# Patient Record
Sex: FEMALE | Race: WHITE | NOT HISPANIC OR LATINO | Employment: UNEMPLOYED | URBAN - METROPOLITAN AREA
[De-identification: names, ages, dates, MRNs, and addresses within clinical notes are randomized per-mention and may not be internally consistent; named-entity substitution may affect disease eponyms.]

---

## 2018-06-07 LAB
EXTERNAL RUBELLA QUALITATIVE: NORMAL
EXTERNAL SYPHILIS RPR SCREEN: NORMAL

## 2018-09-14 LAB — EXTERNAL GTT 1 HOUR: 135

## 2018-11-14 ENCOUNTER — TRANSCRIBE ORDERS (OUTPATIENT)
Dept: LAB | Facility: HOSPITAL | Age: 23
End: 2018-11-14

## 2018-11-14 ENCOUNTER — LAB (OUTPATIENT)
Dept: LAB | Facility: HOSPITAL | Age: 23
End: 2018-11-14

## 2018-11-14 DIAGNOSIS — Z3A.36 36 WEEKS GESTATION OF PREGNANCY: ICD-10-CM

## 2018-11-14 DIAGNOSIS — Z34.83 PRENATAL CARE, SUBSEQUENT PREGNANCY, THIRD TRIMESTER: ICD-10-CM

## 2018-11-14 DIAGNOSIS — Z3A.36 36 WEEKS GESTATION OF PREGNANCY: Primary | ICD-10-CM

## 2018-11-14 LAB
EXTERNAL CHLAMYDIA SCREEN: NEGATIVE
EXTERNAL GONORRHEA SCREEN: NEGATIVE
EXTERNAL GROUP B STREP ANTIGEN: NEGATIVE
EXTERNAL HEPATITIS B SURFACE ANTIGEN: NEGATIVE
EXTERNAL HEPATITIS C AB: NEGATIVE
HIV1 P24 AG SERPL QL IA: NEGATIVE
TSH SERPL DL<=0.05 MIU/L-ACNC: 0.84 MIU/ML (ref 0.35–5.35)

## 2018-11-14 PROCEDURE — 84443 ASSAY THYROID STIM HORMONE: CPT

## 2018-11-14 PROCEDURE — 87340 HEPATITIS B SURFACE AG IA: CPT

## 2018-11-14 PROCEDURE — G0432 EIA HIV-1/HIV-2 SCREEN: HCPCS

## 2018-11-14 PROCEDURE — 36415 COLL VENOUS BLD VENIPUNCTURE: CPT

## 2018-11-14 PROCEDURE — 86803 HEPATITIS C AB TEST: CPT

## 2018-11-15 LAB
HBV SURFACE AG SERPL QL IA: NORMAL
HCV AB SER DONR QL: NORMAL
HIV1+2 AB SER QL: NORMAL

## 2018-12-01 ENCOUNTER — HOSPITAL ENCOUNTER (INPATIENT)
Dept: LABOR AND DELIVERY | Facility: HOSPITAL | Age: 23
LOS: 4 days | Discharge: HOME | End: 2018-12-05
Attending: NURSE PRACTITIONER | Admitting: OBSTETRICS & GYNECOLOGY

## 2018-12-01 PROBLEM — Z34.90 TERM PREGNANCY: Status: ACTIVE | Noted: 2018-12-01

## 2018-12-01 LAB
DEPRECATED RDW RBC AUTO: 42.4 FL (ref 37–54)
ERYTHROCYTE [DISTWIDTH] IN BLOOD BY AUTOMATED COUNT: 14.6 % (ref 11.3–14.5)
HCT VFR BLD AUTO: 31.8 % (ref 34.5–44)
HGB BLD-MCNC: 9.9 G/DL (ref 11.5–15.5)
MCH RBC QN AUTO: 24.9 PG (ref 27–31)
MCHC RBC AUTO-ENTMCNC: 31.1 G/DL (ref 32–36)
MCV RBC AUTO: 79.9 FL (ref 80–99)
PLATELET # BLD AUTO: 263 10*3/MM3 (ref 150–450)
PMV BLD AUTO: 11.2 FL (ref 6–12)
RBC # BLD AUTO: 3.98 10*6/MM3 (ref 3.89–5.14)
WBC NRBC COR # BLD: 13.02 10*3/MM3 (ref 3.5–10.8)

## 2018-12-01 PROCEDURE — 86900 BLOOD TYPING SEROLOGIC ABO: CPT | Performed by: NURSE PRACTITIONER

## 2018-12-01 PROCEDURE — 85027 COMPLETE CBC AUTOMATED: CPT | Performed by: NURSE PRACTITIONER

## 2018-12-01 PROCEDURE — 86850 RBC ANTIBODY SCREEN: CPT | Performed by: NURSE PRACTITIONER

## 2018-12-01 PROCEDURE — 93005 ELECTROCARDIOGRAM TRACING: CPT | Performed by: NURSE PRACTITIONER

## 2018-12-01 PROCEDURE — 80053 COMPREHEN METABOLIC PANEL: CPT | Performed by: NURSE PRACTITIONER

## 2018-12-01 PROCEDURE — 25010000002 BUTORPHANOL PER 1 MG: Performed by: NURSE PRACTITIONER

## 2018-12-01 PROCEDURE — 93010 ELECTROCARDIOGRAM REPORT: CPT | Performed by: INTERNAL MEDICINE

## 2018-12-01 PROCEDURE — 80306 DRUG TEST PRSMV INSTRMNT: CPT | Performed by: NURSE PRACTITIONER

## 2018-12-01 PROCEDURE — 86901 BLOOD TYPING SEROLOGIC RH(D): CPT | Performed by: NURSE PRACTITIONER

## 2018-12-01 RX ORDER — SODIUM CHLORIDE 0.9 % (FLUSH) 0.9 %
3-10 SYRINGE (ML) INJECTION AS NEEDED
Status: DISCONTINUED | OUTPATIENT
Start: 2018-12-01 | End: 2018-12-02 | Stop reason: HOSPADM

## 2018-12-01 RX ORDER — TERBUTALINE SULFATE 1 MG/ML
0.25 INJECTION, SOLUTION SUBCUTANEOUS AS NEEDED
Status: DISCONTINUED | OUTPATIENT
Start: 2018-12-01 | End: 2018-12-02

## 2018-12-01 RX ORDER — OXYTOCIN-SODIUM CHLORIDE 0.9% IV SOLN 30 UNIT/500ML 30-0.9/5 UT/ML-%
2-24 SOLUTION INTRAVENOUS
Status: DISCONTINUED | OUTPATIENT
Start: 2018-12-02 | End: 2018-12-02

## 2018-12-01 RX ORDER — SODIUM CHLORIDE 0.9 % (FLUSH) 0.9 %
3 SYRINGE (ML) INJECTION EVERY 12 HOURS SCHEDULED
Status: DISCONTINUED | OUTPATIENT
Start: 2018-12-01 | End: 2018-12-02 | Stop reason: HOSPADM

## 2018-12-01 RX ORDER — PROMETHAZINE HYDROCHLORIDE 12.5 MG/1
12.5 SUPPOSITORY RECTAL EVERY 6 HOURS PRN
Status: DISCONTINUED | OUTPATIENT
Start: 2018-12-01 | End: 2018-12-02 | Stop reason: HOSPADM

## 2018-12-01 RX ORDER — PROMETHAZINE HYDROCHLORIDE 12.5 MG/1
12.5 TABLET ORAL EVERY 6 HOURS PRN
Status: DISCONTINUED | OUTPATIENT
Start: 2018-12-01 | End: 2018-12-02 | Stop reason: HOSPADM

## 2018-12-01 RX ORDER — ONDANSETRON 2 MG/ML
4 INJECTION INTRAMUSCULAR; INTRAVENOUS EVERY 6 HOURS PRN
Status: DISCONTINUED | OUTPATIENT
Start: 2018-12-01 | End: 2018-12-02 | Stop reason: HOSPADM

## 2018-12-01 RX ORDER — ACETAMINOPHEN 325 MG/1
650 TABLET ORAL EVERY 4 HOURS PRN
Status: DISCONTINUED | OUTPATIENT
Start: 2018-12-01 | End: 2018-12-02

## 2018-12-01 RX ORDER — PROMETHAZINE HYDROCHLORIDE 25 MG/ML
12.5 INJECTION, SOLUTION INTRAMUSCULAR; INTRAVENOUS EVERY 6 HOURS PRN
Status: DISCONTINUED | OUTPATIENT
Start: 2018-12-01 | End: 2018-12-02 | Stop reason: HOSPADM

## 2018-12-01 RX ORDER — MAGNESIUM CARB/ALUMINUM HYDROX 105-160MG
30 TABLET,CHEWABLE ORAL ONCE
Status: DISCONTINUED | OUTPATIENT
Start: 2018-12-01 | End: 2018-12-02

## 2018-12-01 RX ORDER — LIDOCAINE HYDROCHLORIDE 10 MG/ML
5 INJECTION, SOLUTION EPIDURAL; INFILTRATION; INTRACAUDAL; PERINEURAL AS NEEDED
Status: DISCONTINUED | OUTPATIENT
Start: 2018-12-01 | End: 2018-12-02

## 2018-12-01 RX ORDER — BUTORPHANOL TARTRATE 1 MG/ML
2 INJECTION, SOLUTION INTRAMUSCULAR; INTRAVENOUS
Status: DISCONTINUED | OUTPATIENT
Start: 2018-12-01 | End: 2018-12-02

## 2018-12-01 RX ORDER — DIPHENHYDRAMINE HCL 25 MG
25 CAPSULE ORAL NIGHTLY PRN
Status: DISCONTINUED | OUTPATIENT
Start: 2018-12-01 | End: 2018-12-02 | Stop reason: HOSPADM

## 2018-12-01 RX ORDER — ONDANSETRON 4 MG/1
4 TABLET, FILM COATED ORAL EVERY 6 HOURS PRN
Status: DISCONTINUED | OUTPATIENT
Start: 2018-12-01 | End: 2018-12-02 | Stop reason: HOSPADM

## 2018-12-01 RX ORDER — DIPHENHYDRAMINE HYDROCHLORIDE 50 MG/ML
25 INJECTION INTRAMUSCULAR; INTRAVENOUS NIGHTLY PRN
Status: DISCONTINUED | OUTPATIENT
Start: 2018-12-01 | End: 2018-12-02 | Stop reason: HOSPADM

## 2018-12-01 RX ORDER — BUTORPHANOL TARTRATE 1 MG/ML
1 INJECTION, SOLUTION INTRAMUSCULAR; INTRAVENOUS
Status: DISCONTINUED | OUTPATIENT
Start: 2018-12-01 | End: 2018-12-02

## 2018-12-01 RX ORDER — SODIUM CHLORIDE, SODIUM LACTATE, POTASSIUM CHLORIDE, CALCIUM CHLORIDE 600; 310; 30; 20 MG/100ML; MG/100ML; MG/100ML; MG/100ML
125 INJECTION, SOLUTION INTRAVENOUS CONTINUOUS
Status: DISCONTINUED | OUTPATIENT
Start: 2018-12-01 | End: 2018-12-03

## 2018-12-01 RX ADMIN — SODIUM CHLORIDE, POTASSIUM CHLORIDE, SODIUM LACTATE AND CALCIUM CHLORIDE 1000 ML/HR: 600; 310; 30; 20 INJECTION, SOLUTION INTRAVENOUS at 23:20

## 2018-12-01 RX ADMIN — BUTORPHANOL TARTRATE 1 MG: 1 INJECTION, SOLUTION INTRAMUSCULAR; INTRAVENOUS at 23:38

## 2018-12-02 ENCOUNTER — ANESTHESIA EVENT (OUTPATIENT)
Dept: LABOR AND DELIVERY | Facility: HOSPITAL | Age: 23
End: 2018-12-02

## 2018-12-02 ENCOUNTER — ANESTHESIA (OUTPATIENT)
Dept: LABOR AND DELIVERY | Facility: HOSPITAL | Age: 23
End: 2018-12-02

## 2018-12-02 PROBLEM — Z34.90 TERM PREGNANCY: Status: RESOLVED | Noted: 2018-12-01 | Resolved: 2018-12-02

## 2018-12-02 LAB
ABO GROUP BLD: NORMAL
ALBUMIN SERPL-MCNC: 3.74 G/DL (ref 3.2–4.8)
ALBUMIN/GLOB SERPL: 1.7 G/DL (ref 1.5–2.5)
ALP SERPL-CCNC: 176 U/L (ref 25–100)
ALT SERPL W P-5'-P-CCNC: 31 U/L (ref 7–40)
AMPHET+METHAMPHET UR QL: NEGATIVE
AMPHETAMINES UR QL: NEGATIVE
ANION GAP SERPL CALCULATED.3IONS-SCNC: 4 MMOL/L (ref 3–11)
AST SERPL-CCNC: 26 U/L (ref 0–33)
BARBITURATES UR QL SCN: NEGATIVE
BENZODIAZ UR QL SCN: NEGATIVE
BILIRUB SERPL-MCNC: 0.4 MG/DL (ref 0.3–1.2)
BLD GP AB SCN SERPL QL: NEGATIVE
BUN BLD-MCNC: 12 MG/DL (ref 9–23)
BUN/CREAT SERPL: 19.7 (ref 7–25)
BUPRENORPHINE SERPL-MCNC: NEGATIVE NG/ML
CALCIUM SPEC-SCNC: 8.9 MG/DL (ref 8.7–10.4)
CANNABINOIDS SERPL QL: NEGATIVE
CHLORIDE SERPL-SCNC: 106 MMOL/L (ref 99–109)
CO2 SERPL-SCNC: 25 MMOL/L (ref 20–31)
COCAINE UR QL: NEGATIVE
CREAT BLD-MCNC: 0.61 MG/DL (ref 0.6–1.3)
GFR SERPL CREATININE-BSD FRML MDRD: 120 ML/MIN/1.73
GLOBULIN UR ELPH-MCNC: 2.2 GM/DL
GLUCOSE BLD-MCNC: 83 MG/DL (ref 70–100)
METHADONE UR QL SCN: NEGATIVE
OPIATES UR QL: NEGATIVE
OXYCODONE UR QL SCN: NEGATIVE
PCP UR QL SCN: NEGATIVE
POTASSIUM BLD-SCNC: 3.5 MMOL/L (ref 3.5–5.5)
PROPOXYPH UR QL: NEGATIVE
PROT SERPL-MCNC: 5.9 G/DL (ref 5.7–8.2)
RH BLD: POSITIVE
SODIUM BLD-SCNC: 135 MMOL/L (ref 132–146)
T&S EXPIRATION DATE: NORMAL
TRICYCLICS UR QL SCN: NEGATIVE

## 2018-12-02 PROCEDURE — 25010000003 CEFAZOLIN IN DEXTROSE 2-4 GM/100ML-% SOLUTION: Performed by: OBSTETRICS & GYNECOLOGY

## 2018-12-02 PROCEDURE — 59514 CESAREAN DELIVERY ONLY: CPT | Performed by: OBSTETRICS & GYNECOLOGY

## 2018-12-02 PROCEDURE — 10907ZC DRAINAGE OF AMNIOTIC FLUID, THERAPEUTIC FROM PRODUCTS OF CONCEPTION, VIA NATURAL OR ARTIFICIAL OPENING: ICD-10-PCS | Performed by: OBSTETRICS & GYNECOLOGY

## 2018-12-02 PROCEDURE — 25010000003 MORPHINE PER 10 MG: Performed by: ANESTHESIOLOGY

## 2018-12-02 PROCEDURE — 59025 FETAL NON-STRESS TEST: CPT

## 2018-12-02 PROCEDURE — 25010000002 PROMETHAZINE PER 50 MG: Performed by: NURSE PRACTITIONER

## 2018-12-02 PROCEDURE — 25010000002 ONDANSETRON PER 1 MG: Performed by: NURSE PRACTITIONER

## 2018-12-02 PROCEDURE — 25010000002 TERBUTALINE PER 1 MG: Performed by: NURSE PRACTITIONER

## 2018-12-02 PROCEDURE — C1755 CATHETER, INTRASPINAL: HCPCS | Performed by: ANESTHESIOLOGY

## 2018-12-02 PROCEDURE — 25010000002 HYDROMORPHONE PER 4 MG: Performed by: ANESTHESIOLOGY

## 2018-12-02 PROCEDURE — 51703 INSERT BLADDER CATH COMPLEX: CPT

## 2018-12-02 PROCEDURE — 25010000002 BUTORPHANOL PER 1 MG: Performed by: NURSE PRACTITIONER

## 2018-12-02 PROCEDURE — 25010000002 ONDANSETRON PER 1 MG: Performed by: ANESTHESIOLOGY

## 2018-12-02 PROCEDURE — C1755 CATHETER, INTRASPINAL: HCPCS

## 2018-12-02 PROCEDURE — 25010000002 FENTANYL CITRATE (PF) 100 MCG/2ML SOLUTION: Performed by: ANESTHESIOLOGY

## 2018-12-02 PROCEDURE — 25010000002 ROPIVACAINE PER 1 MG: Performed by: ANESTHESIOLOGY

## 2018-12-02 PROCEDURE — 3E033VJ INTRODUCTION OF OTHER HORMONE INTO PERIPHERAL VEIN, PERCUTANEOUS APPROACH: ICD-10-PCS | Performed by: OBSTETRICS & GYNECOLOGY

## 2018-12-02 PROCEDURE — 25010000002 METOCLOPRAMIDE PER 10 MG: Performed by: ANESTHESIOLOGY

## 2018-12-02 RX ORDER — DIPHENHYDRAMINE HCL 25 MG
25 CAPSULE ORAL EVERY 6 HOURS PRN
Status: DISCONTINUED | OUTPATIENT
Start: 2018-12-02 | End: 2018-12-02 | Stop reason: HOSPADM

## 2018-12-02 RX ORDER — ONDANSETRON 2 MG/ML
4 INJECTION INTRAMUSCULAR; INTRAVENOUS ONCE
Status: COMPLETED | OUTPATIENT
Start: 2018-12-02 | End: 2018-12-02

## 2018-12-02 RX ORDER — OXYTOCIN 10 [USP'U]/ML
INJECTION, SOLUTION INTRAMUSCULAR; INTRAVENOUS AS NEEDED
Status: DISCONTINUED | OUTPATIENT
Start: 2018-12-02 | End: 2018-12-02 | Stop reason: SURG

## 2018-12-02 RX ORDER — TRISODIUM CITRATE DIHYDRATE AND CITRIC ACID MONOHYDRATE 500; 334 MG/5ML; MG/5ML
30 SOLUTION ORAL ONCE
Status: COMPLETED | OUTPATIENT
Start: 2018-12-02 | End: 2018-12-02

## 2018-12-02 RX ORDER — METHYLERGONOVINE MALEATE 0.2 MG/ML
200 INJECTION INTRAVENOUS ONCE AS NEEDED
Status: DISCONTINUED | OUTPATIENT
Start: 2018-12-02 | End: 2018-12-02 | Stop reason: HOSPADM

## 2018-12-02 RX ORDER — NALOXONE HCL 0.4 MG/ML
0.4 VIAL (ML) INJECTION
Status: DISCONTINUED | OUTPATIENT
Start: 2018-12-02 | End: 2018-12-03

## 2018-12-02 RX ORDER — HYDROMORPHONE HYDROCHLORIDE 1 MG/ML
0.5 INJECTION, SOLUTION INTRAMUSCULAR; INTRAVENOUS; SUBCUTANEOUS
Status: COMPLETED | OUTPATIENT
Start: 2018-12-02 | End: 2018-12-02

## 2018-12-02 RX ORDER — CARBOPROST TROMETHAMINE 250 UG/ML
250 INJECTION, SOLUTION INTRAMUSCULAR AS NEEDED
Status: DISCONTINUED | OUTPATIENT
Start: 2018-12-02 | End: 2018-12-02 | Stop reason: HOSPADM

## 2018-12-02 RX ORDER — IBUPROFEN 600 MG/1
600 TABLET ORAL ONCE AS NEEDED
Status: DISCONTINUED | OUTPATIENT
Start: 2018-12-02 | End: 2018-12-02

## 2018-12-02 RX ORDER — ROPIVACAINE HYDROCHLORIDE 2 MG/ML
16 INJECTION, SOLUTION EPIDURAL; INFILTRATION; PERINEURAL CONTINUOUS
Status: DISCONTINUED | OUTPATIENT
Start: 2018-12-02 | End: 2018-12-02

## 2018-12-02 RX ORDER — TRISODIUM CITRATE DIHYDRATE AND CITRIC ACID MONOHYDRATE 500; 334 MG/5ML; MG/5ML
30 SOLUTION ORAL ONCE
Status: DISCONTINUED | OUTPATIENT
Start: 2018-12-02 | End: 2018-12-02

## 2018-12-02 RX ORDER — CEFAZOLIN SODIUM 2 G/100ML
2 INJECTION, SOLUTION INTRAVENOUS ONCE
Status: COMPLETED | OUTPATIENT
Start: 2018-12-02 | End: 2018-12-02

## 2018-12-02 RX ORDER — FENTANYL CITRATE 50 UG/ML
INJECTION, SOLUTION INTRAMUSCULAR; INTRAVENOUS AS NEEDED
Status: DISCONTINUED | OUTPATIENT
Start: 2018-12-02 | End: 2018-12-02 | Stop reason: SURG

## 2018-12-02 RX ORDER — OXYTOCIN-SODIUM CHLORIDE 0.9% IV SOLN 30 UNIT/500ML 30-0.9/5 UT/ML-%
650 SOLUTION INTRAVENOUS ONCE
Status: DISCONTINUED | OUTPATIENT
Start: 2018-12-02 | End: 2018-12-02 | Stop reason: HOSPADM

## 2018-12-02 RX ORDER — ACETAMINOPHEN 325 MG/1
650 TABLET ORAL ONCE AS NEEDED
Status: DISCONTINUED | OUTPATIENT
Start: 2018-12-02 | End: 2018-12-02 | Stop reason: HOSPADM

## 2018-12-02 RX ORDER — BUPIVACAINE HYDROCHLORIDE 7.5 MG/ML
INJECTION, SOLUTION INTRASPINAL AS NEEDED
Status: DISCONTINUED | OUTPATIENT
Start: 2018-12-02 | End: 2018-12-02 | Stop reason: SURG

## 2018-12-02 RX ORDER — FAMOTIDINE 10 MG/ML
INJECTION, SOLUTION INTRAVENOUS AS NEEDED
Status: DISCONTINUED | OUTPATIENT
Start: 2018-12-02 | End: 2018-12-02 | Stop reason: SURG

## 2018-12-02 RX ORDER — METOCLOPRAMIDE HYDROCHLORIDE 5 MG/ML
10 INJECTION INTRAMUSCULAR; INTRAVENOUS ONCE AS NEEDED
Status: DISCONTINUED | OUTPATIENT
Start: 2018-12-02 | End: 2018-12-02 | Stop reason: HOSPADM

## 2018-12-02 RX ORDER — MORPHINE SULFATE 0.5 MG/ML
INJECTION, SOLUTION EPIDURAL; INTRATHECAL; INTRAVENOUS AS NEEDED
Status: DISCONTINUED | OUTPATIENT
Start: 2018-12-02 | End: 2018-12-02 | Stop reason: SURG

## 2018-12-02 RX ORDER — HYDROCODONE BITARTRATE AND ACETAMINOPHEN 5; 325 MG/1; MG/1
1 TABLET ORAL EVERY 6 HOURS PRN
Status: DISCONTINUED | OUTPATIENT
Start: 2018-12-02 | End: 2018-12-03

## 2018-12-02 RX ORDER — MISOPROSTOL 200 UG/1
800 TABLET ORAL AS NEEDED
Status: DISCONTINUED | OUTPATIENT
Start: 2018-12-02 | End: 2018-12-02 | Stop reason: HOSPADM

## 2018-12-02 RX ORDER — ONDANSETRON 2 MG/ML
4 INJECTION INTRAMUSCULAR; INTRAVENOUS ONCE AS NEEDED
Status: DISCONTINUED | OUTPATIENT
Start: 2018-12-02 | End: 2018-12-02 | Stop reason: HOSPADM

## 2018-12-02 RX ORDER — EPHEDRINE SULFATE/0.9% NACL/PF 25 MG/5 ML
10 SYRINGE (ML) INTRAVENOUS
Status: DISCONTINUED | OUTPATIENT
Start: 2018-12-02 | End: 2018-12-02 | Stop reason: HOSPADM

## 2018-12-02 RX ORDER — IBUPROFEN 600 MG/1
600 TABLET ORAL EVERY 6 HOURS PRN
Status: DISCONTINUED | OUTPATIENT
Start: 2018-12-02 | End: 2018-12-02 | Stop reason: HOSPADM

## 2018-12-02 RX ORDER — OXYTOCIN-SODIUM CHLORIDE 0.9% IV SOLN 30 UNIT/500ML 30-0.9/5 UT/ML-%
85 SOLUTION INTRAVENOUS ONCE
Status: DISCONTINUED | OUTPATIENT
Start: 2018-12-02 | End: 2018-12-02 | Stop reason: HOSPADM

## 2018-12-02 RX ORDER — METOCLOPRAMIDE HYDROCHLORIDE 5 MG/ML
10 INJECTION INTRAMUSCULAR; INTRAVENOUS ONCE AS NEEDED
Status: COMPLETED | OUTPATIENT
Start: 2018-12-02 | End: 2018-12-02

## 2018-12-02 RX ORDER — HYDROCODONE BITARTRATE AND ACETAMINOPHEN 5; 325 MG/1; MG/1
2 TABLET ORAL EVERY 6 HOURS PRN
Status: DISCONTINUED | OUTPATIENT
Start: 2018-12-02 | End: 2018-12-03

## 2018-12-02 RX ORDER — LIDOCAINE HYDROCHLORIDE AND EPINEPHRINE 15; 5 MG/ML; UG/ML
INJECTION, SOLUTION EPIDURAL AS NEEDED
Status: DISCONTINUED | OUTPATIENT
Start: 2018-12-02 | End: 2018-12-02 | Stop reason: SURG

## 2018-12-02 RX ORDER — LIDOCAINE HYDROCHLORIDE AND EPINEPHRINE BITARTRATE 20; .01 MG/ML; MG/ML
INJECTION, SOLUTION SUBCUTANEOUS AS NEEDED
Status: DISCONTINUED | OUTPATIENT
Start: 2018-12-02 | End: 2018-12-02 | Stop reason: SURG

## 2018-12-02 RX ADMIN — SODIUM CHLORIDE, POTASSIUM CHLORIDE, SODIUM LACTATE AND CALCIUM CHLORIDE 1000 ML: 600; 310; 30; 20 INJECTION, SOLUTION INTRAVENOUS at 19:35

## 2018-12-02 RX ADMIN — SODIUM CHLORIDE, POTASSIUM CHLORIDE, SODIUM LACTATE AND CALCIUM CHLORIDE: 600; 310; 30; 20 INJECTION, SOLUTION INTRAVENOUS at 19:40

## 2018-12-02 RX ADMIN — ROPIVACAINE HYDROCHLORIDE 16 ML/HR: 2 INJECTION, SOLUTION EPIDURAL; INFILTRATION at 14:40

## 2018-12-02 RX ADMIN — METOCLOPRAMIDE 10 MG: 5 INJECTION, SOLUTION INTRAMUSCULAR; INTRAVENOUS at 20:23

## 2018-12-02 RX ADMIN — BUTORPHANOL TARTRATE 1 MG: 1 INJECTION, SOLUTION INTRAMUSCULAR; INTRAVENOUS at 00:04

## 2018-12-02 RX ADMIN — ONDANSETRON 4 MG: 2 INJECTION INTRAMUSCULAR; INTRAVENOUS at 20:23

## 2018-12-02 RX ADMIN — TERBUTALINE SULFATE 0.25 MG: 1 INJECTION SUBCUTANEOUS at 15:21

## 2018-12-02 RX ADMIN — PROMETHAZINE HYDROCHLORIDE 12.5 MG: 25 INJECTION, SOLUTION INTRAMUSCULAR; INTRAVENOUS at 08:12

## 2018-12-02 RX ADMIN — ROPIVACAINE HYDROCHLORIDE 10 ML: 5 INJECTION, SOLUTION EPIDURAL; INFILTRATION; PERINEURAL at 14:40

## 2018-12-02 RX ADMIN — ONDANSETRON 4 MG: 2 INJECTION INTRAMUSCULAR; INTRAVENOUS at 05:59

## 2018-12-02 RX ADMIN — LIDOCAINE HYDROCHLORIDE,EPINEPHRINE BITARTRATE 10 ML: 20; .01 INJECTION, SOLUTION INFILTRATION; PERINEURAL at 18:05

## 2018-12-02 RX ADMIN — LIDOCAINE HYDROCHLORIDE AND EPINEPHRINE 2 ML: 15; 5 INJECTION, SOLUTION EPIDURAL at 02:24

## 2018-12-02 RX ADMIN — MORPHINE SULFATE 0.2 MG: 0.5 INJECTION, SOLUTION EPIDURAL; INTRATHECAL; INTRAVENOUS at 20:16

## 2018-12-02 RX ADMIN — FENTANYL CITRATE 15 MCG: 50 INJECTION, SOLUTION INTRAMUSCULAR; INTRAVENOUS at 20:16

## 2018-12-02 RX ADMIN — FAMOTIDINE 20 MG: 10 INJECTION, SOLUTION INTRAVENOUS at 20:23

## 2018-12-02 RX ADMIN — ONDANSETRON 4 MG: 2 INJECTION INTRAMUSCULAR; INTRAVENOUS at 21:44

## 2018-12-02 RX ADMIN — LIDOCAINE HYDROCHLORIDE AND EPINEPHRINE 3 ML: 15; 5 INJECTION, SOLUTION EPIDURAL at 02:22

## 2018-12-02 RX ADMIN — BUPIVACAINE HYDROCHLORIDE IN DEXTROSE 1.8 ML: 7.5 INJECTION, SOLUTION SUBARACHNOID at 20:16

## 2018-12-02 RX ADMIN — ROPIVACAINE HYDROCHLORIDE 10 ML: 5 INJECTION, SOLUTION EPIDURAL; INFILTRATION; PERINEURAL at 10:40

## 2018-12-02 RX ADMIN — SODIUM CITRATE AND CITRIC ACID MONOHYDRATE 30 ML: 500; 334 SOLUTION ORAL at 20:00

## 2018-12-02 RX ADMIN — BUTORPHANOL TARTRATE 2 MG: 1 INJECTION, SOLUTION INTRAMUSCULAR; INTRAVENOUS at 02:09

## 2018-12-02 RX ADMIN — HYDROCODONE BITARTRATE AND ACETAMINOPHEN 2 TABLET: 5; 325 TABLET ORAL at 21:44

## 2018-12-02 RX ADMIN — OXYTOCIN-SODIUM CHLORIDE 0.9% IV SOLN 30 UNIT/500ML 2 MILLI-UNITS/MIN: 30-0.9/5 SOLUTION at 05:55

## 2018-12-02 RX ADMIN — HYDROMORPHONE HYDROCHLORIDE 0.5 MG: 1 INJECTION, SOLUTION INTRAMUSCULAR; INTRAVENOUS; SUBCUTANEOUS at 21:18

## 2018-12-02 RX ADMIN — SODIUM CHLORIDE, POTASSIUM CHLORIDE, SODIUM LACTATE AND CALCIUM CHLORIDE 1000 ML: 600; 310; 30; 20 INJECTION, SOLUTION INTRAVENOUS at 01:56

## 2018-12-02 RX ADMIN — OXYTOCIN 10 UNITS: 10 INJECTION, SOLUTION INTRAMUSCULAR; INTRAVENOUS at 20:30

## 2018-12-02 RX ADMIN — FENTANYL CITRATE 100 MCG: 50 INJECTION, SOLUTION INTRAMUSCULAR; INTRAVENOUS at 02:29

## 2018-12-02 RX ADMIN — OXYTOCIN 20 UNITS: 10 INJECTION, SOLUTION INTRAMUSCULAR; INTRAVENOUS at 20:35

## 2018-12-02 RX ADMIN — SODIUM CHLORIDE, POTASSIUM CHLORIDE, SODIUM LACTATE AND CALCIUM CHLORIDE 125 ML/HR: 600; 310; 30; 20 INJECTION, SOLUTION INTRAVENOUS at 02:37

## 2018-12-02 RX ADMIN — SODIUM CHLORIDE, POTASSIUM CHLORIDE, SODIUM LACTATE AND CALCIUM CHLORIDE 125 ML/HR: 600; 310; 30; 20 INJECTION, SOLUTION INTRAVENOUS at 11:19

## 2018-12-02 RX ADMIN — CEFAZOLIN SODIUM 2 G: 2 INJECTION, SOLUTION INTRAVENOUS at 19:54

## 2018-12-02 RX ADMIN — LIDOCAINE HYDROCHLORIDE,EPINEPHRINE BITARTRATE 10 ML: 20; .01 INJECTION, SOLUTION INFILTRATION; PERINEURAL at 15:00

## 2018-12-02 RX ADMIN — SODIUM CHLORIDE, POTASSIUM CHLORIDE, SODIUM LACTATE AND CALCIUM CHLORIDE: 600; 310; 30; 20 INJECTION, SOLUTION INTRAVENOUS at 20:30

## 2018-12-02 RX ADMIN — ONDANSETRON 4 MG: 2 INJECTION INTRAMUSCULAR; INTRAVENOUS at 12:46

## 2018-12-02 RX ADMIN — LIDOCAINE HYDROCHLORIDE,EPINEPHRINE BITARTRATE 10 ML: 20; .01 INJECTION, SOLUTION INFILTRATION; PERINEURAL at 16:25

## 2018-12-02 RX ADMIN — ROPIVACAINE HYDROCHLORIDE 16 ML/HR: 2 INJECTION, SOLUTION EPIDURAL; INFILTRATION at 02:33

## 2018-12-02 RX ADMIN — ROPIVACAINE HYDROCHLORIDE 13 ML: 5 INJECTION, SOLUTION EPIDURAL; INFILTRATION; PERINEURAL at 02:26

## 2018-12-02 RX ADMIN — HYDROMORPHONE HYDROCHLORIDE 0.5 MG: 1 INJECTION, SOLUTION INTRAMUSCULAR; INTRAVENOUS; SUBCUTANEOUS at 21:54

## 2018-12-02 RX ADMIN — ONDANSETRON 4 MG: 2 INJECTION INTRAMUSCULAR; INTRAVENOUS at 17:40

## 2018-12-02 NOTE — PROGRESS NOTES
[facility]  Obstetric Progress Note    Subjective     Patient:    Patient now comfortable after multiple epidural boluses.    Objective     Vital Signs Range for the last 24 hours  Temp:  [98 °F (36.7 °C)-99.9 °F (37.7 °C)] 99 °F (37.2 °C)   Temp src: Oral   BP: ()/(50-84) 133/76   Heart Rate:  [] 111   Resp:  [16-18] 18               Weight:  [109 kg (241 lb)] 109 kg (241 lb)       Intake/Output this shift:    I/O this shift:  In: -   Out: 1625 [Urine:1625]    Physical Exam:      Abdomen Abdominal exam: soft, nontender, nondistended, no masses or organomegaly.   Extremities Exam of extremities: peripheral pulses normal, no pedal edema, no clubbing or cyanosis     Presentation: vertex   Cervix: Exam by: Method: sterile exam per CNM, other (see comments)(no change)   Dilation:     Effacement: Cervical Effacement: 90%   Station:           Fetal Heart Rate Assessment   Method: Fetal HR Assessment Method: external   Beats/min: Fetal HR (beats/min): 155   Baseline: Fetal Heart Baseline Rate: normal range   Varibility: Fetal HR Variability: moderate (amplitude range 6 to 25 bpm)   Accels: Fetal HR Accelerations: greater than/equal to 15 bpm, lasting at least 15 seconds   Decels: Fetal HR Decelerations: absent   Tracing Category:       Uterine Assessment   Method: Method: IUPC (intrauterine pressure catheter)   Frequency (min): Contraction Frequency (Minutes): 2-3   Ctx Count in 10 min:     Duration:     Intensity: Contraction Intensity: moderate by palpation   Intensity by IUPC: Contraction Intensity (mm Hg) by IUPC: unable to determine, provider aware   Resting Tone: Uterine Resting Tone: soft by palpation   Resting Tone by IUPC: Uterine Resting Tone (mmHg) by IUPC: 25   [place] Units:         Assessment/Plan       Term pregnancy        Assessment:  1.  Intrauterine pregnancy at 39w1d weeks gestation with reactive fetal status.    2.  IOL  3.  Arrest of dilation despite adequate uterine  contractions.    Plan:  1. will proceed with primary  section  2.  Discussed with Dr. Martinez  3.   Plan of care has been reviewed with patient and she verbalizes understanding  4.  Risks, benefits of treatment plan have been discussed.  5.  All questions have been answered.        Herlinda Self CNM  2018  6:46 PM

## 2018-12-02 NOTE — PLAN OF CARE
Problem: Patient Care Overview  Goal: Plan of Care Review  Outcome: Ongoing (interventions implemented as appropriate)    Goal: Individualization and Mutuality  Outcome: Ongoing (interventions implemented as appropriate)    Goal: Discharge Needs Assessment  Outcome: Ongoing (interventions implemented as appropriate)    Goal: Interprofessional Rounds/Family Conf  Outcome: Ongoing (interventions implemented as appropriate)      Problem: Labor (Cervical Ripen, Induct, Augment) (Adult,Obstetrics,Pediatric)  Goal: Signs and Symptoms of Listed Potential Problems Will be Absent, Minimized or Managed (Labor)  Outcome: Ongoing (interventions implemented as appropriate)   12/02/18 2932   Goal/Outcome Evaluation   Problems Assessed (Labor) all

## 2018-12-02 NOTE — H&P
[place]  Obstetric History and Physical    No chief complaint on file.      Subjective     Patient is a 23 y.o. female  currently at 39w0d, who presents for induction of labor.    Her prenatal care is benign.  Her previous obstetric/gynecological history is noted for is non-contributory.  Her PMH is noted for stable SVT.  She underwent cardiac ablation in .     The following portions of the patients history were reviewed and updated as appropriate: current medications, allergies, past medical history, past surgical history, past family history, past social history and problem list .       Prenatal Information:  Prenatal Results     Initial Prenatal Labs     Test Value Reference Range Date Time    Hemoglobin        Hematocrit        Platelets        Rubella IgG        Hepatitis B SAg Non-Reactive  Non-Reactive 18 1620    Hepatitis C Ab Non-Reactive  Non-Reactive 18 1620    RPR        ABO        Rh        Antibody Screen        HIV Non-Reactive  Non-Reactive 18 1620    Urine Culture        Gonorrhea Negative   18     Chlamydia Negative   18     TSH 0.839 mIU/mL 0.350 - 5.350 mIU/mL 18 1620          2nd and 3rd Trimester     Test Value Reference Range Date Time    Hemoglobin (repeated)        Hematocrit (repeated)        GCT        Antibody Screen (repeated)        GTT Fasting        GTT 1 Hr        GTT 2 Hr        GTT 3 Hr        Group B Strep Negative   18           Drug Screening     Test Value Reference Range Date Time    Amphetamine Screen        Barbiturate Screen        Benzodiazepine Screen        Methadone Screen        Phencyclidine Screen        Opiates Screen        THC Screen        Cocaine Screen        Propoxyphene Screen        Buprenorphine Screen        Methamphetamine Screen        Oxycodone Screen        Tryicyclic Antidepressants Screen              Other (Risk screening)     Test Value Reference Range Date Time    Varicella IgG         Parvovirus IgG        CMV IgG        Cystic Fibrosis        Hemoglobin electrophoresis        NIPT        MSAFP-4        AFP (for NTD only)                  External Prenatal Results     Pregnancy Outside Results - Transcribed From Office Records - See Scanned Records For Details     Test Value Date Time    Hgb       Hct       ABO       Rh       Antibody Screen       Glucose Fasting GTT       Glucose Tolerance Test 1 hour       Glucose Tolerance Test 3 hour       Gonorrhea (discrete) Negative  18     Chlamydia (discrete) Negative  18     RPR       VDRL       Syphilis Antibody       Rubella       HBsAg Non-Reactive  18 1620    Herpes Simplex Virus PCR       Herpes Simplex VIrus Culture       HIV Non-Reactive  18 1620    Hep C RNA Quant PCR       Hep C Antibody Non-Reactive  18 1620    AFP       Group B Strep Negative  18     GBS Susceptibility to Clindamycin       GBS Susceptibility to Erythromycin       Fetal Fibronectin       Genetic Testing, Maternal Blood             Drug Screening     Test Value Date Time    Urine Drug Screen       Amphetamine Screen       Barbiturate Screen       Benzodiazepine Screen       Methadone Screen       Phencyclidine Screen       Opiates Screen       THC Screen       Cocaine Screen       Propoxyphene Screen       Buprenorphine Screen       Methamphetamine Screen       Oxycodone Screen       Tricyclic Antidepressants Screen                    Past OB History:     Obstetric History       T0      L0     SAB0   TAB0   Ectopic0   Molar0   Multiple0   Live Births0       # Outcome Date GA Lbr Sesar/2nd Weight Sex Delivery Anes PTL Lv   1 Current                   Past Medical History: Past Medical History:   Diagnosis Date   • Kidney stones     last one    • SVT (supraventricular tachycardia) (CMS/HCC)     ablasion at age 13      Past Surgical History Past Surgical History:   Procedure Laterality Date   • CARDIAC ABLATION        Family  History: No family history on file.   Social History:  reports that  has never smoked. she has never used smokeless tobacco.   reports that she does not drink alcohol.   reports that she does not use drugs.        Review of Systems   Constitutional: Negative.    HENT: Negative.    Eyes: Negative.    Respiratory: Negative.    Cardiovascular: Negative.    Gastrointestinal: Negative.    Endocrine: Negative.    Genitourinary: Negative.    Musculoskeletal: Negative.    Skin: Negative.    Allergic/Immunologic: Negative.    Neurological: Negative.    Hematological: Negative.    Psychiatric/Behavioral: Negative.          Objective     Vital Signs Range for the last 24 hours  Temperature: Temp:  [98.2 °F (36.8 °C)] 98.2 °F (36.8 °C)   Temp Source: Temp src: Oral   BP: BP: (123)/(82) 123/82   Pulse: Heart Rate:  [93] 93   Respirations: Resp:  [16] 16   SPO2:     O2 Amount (l/min):     O2 Devices     Weight:       Physical Examination: General appearance - alert, well appearing, and in no distress  Neck - supple, no significant adenopathy  Chest - clear to auscultation, no wheezes, rales or rhonchi, symmetric air entry  Heart - normal rate, regular rhythm, normal S1, S2, no murmurs, rubs, clicks or gallops  Abdomen - soft, nontender, nondistended, no masses or organomegaly  Pelvic - normal external genitalia, vulva, vagina, cervix, uterus and adnexa  Extremities - peripheral pulses normal, no pedal edema, no clubbing or cyanosis    Presentation: vertex   Cervix: Exam by:   CNM   Dilation:   1   Effacement:     Station:   -2     Fetal Heart Rate Assessment   Method:   EFM   Beats/min:   150   Baseline:   150s   Variability:   moderate   Accels:   present   Decels:  absent   Tracing Category:  1     Uterine Assessment   Method:   toco   Frequency (min):   no contractions noted   Ctx Count in 10 min:     Duration:     Intensity:     Intensity by IUPC:     Resting Tone:     Resting Tone by IUPC:     [place] Units:          Assessment/Plan       Term pregnancy      Assessment & Plan    Assessment:  1.  Intrauterine pregnancy at 39w0d gestation with reactive fetal status.    2.  Induction of labor  3.  Obstetrical history significant for is non-contributory.  4.  GBS status:   External Strep Group B Ag   Date Value Ref Range Status   11/14/2018 Negative  Final       Plan:  1. fetal and uterine monitoring  continuously, cervical ripening with  intra-uterine valdez catheter, labor augmentation  Pitocin and analgesia with  parenteral narcotics and epidural  2. Plan of care has been reviewed with patient and she verbalizes understanding  3.  Risks, benefits of treatment plan have been discussed.  4.  All questions have been answered.  5.  Epidural as desires      Herlinda Self CNM  12/1/2018  11:29 PM

## 2018-12-02 NOTE — PROGRESS NOTES
[facility]  Obstetric Progress Note    Subjective     Patient:    Patient complains of pain and pressure with contractions.    Objective     Vital Signs Range for the last 24 hours  Temp:  [98 °F (36.7 °C)-99.6 °F (37.6 °C)] 99.6 °F (37.6 °C)   Temp src: Oral   BP: ()/(50-84) 119/74   Heart Rate:  [] 90   Resp:  [16-18] 18               Weight:  [109 kg (241 lb)] 109 kg (241 lb)       Intake/Output this shift:    I/O this shift:  In: -   Out: 900 [Urine:900]    Physical Exam:      Abdomen Abdominal exam: soft, nontender, nondistended, no masses or organomegaly.   Extremities Exam of extremities: peripheral pulses normal, no pedal edema, no clubbing or cyanosis     Presentation: vertex   Cervix: Exam by: Method: sterile exam per CNM   Dilation:   7   Effacement: Cervical Effacement: 90%   Station:   0         Fetal Heart Rate Assessment   Method: Fetal HR Assessment Method: external   Beats/min: Fetal HR (beats/min): 155   Baseline: Fetal Heart Baseline Rate: normal range   Varibility: Fetal HR Variability: moderate (amplitude range 6 to 25 bpm)   Accels: Fetal HR Accelerations: greater than/equal to 15 bpm, lasting at least 15 seconds   Decels: Fetal HR Decelerations: absent   Tracing Category:       Uterine Assessment   Method: Method: IUPC (intrauterine pressure catheter)   Frequency (min): Contraction Frequency (Minutes): 1   Ctx Count in 10 min:     Duration:     Intensity: Contraction Intensity: moderate by palpation   Intensity by IUPC: Contraction Intensity (mm Hg) by IUPC: (provider at bedside; unable to determine; see MAR)   Resting Tone: Uterine Resting Tone: soft by palpation   Resting Tone by IUPC: Uterine Resting Tone (mmHg) by IUPC: 25   [place] Units:         Assessment/Plan       Term pregnancy        Assessment:  1.  Intrauterine pregnancy at 39w1d weeks gestation with reactive fetal status.    2.  IOL  3. .  IUPC placed.  Tachysystole noted. Pitocin decreased then discontinued.   Terbutaline administered    Plan:  1. Continue observation. Consider restarting pitocin for adequate MVUs  2.  Repeat SVE every 2-4 hours or prn  3.   Plan of care has been reviewed with patient and she verbalizes understanding  4.  Risks, benefits of treatment plan have been discussed.  5.  All questions have been answered.        Herlinda Self CNM  12/2/2018  3:52 PM

## 2018-12-02 NOTE — ANESTHESIA PROCEDURE NOTES
Labor Epidural      Patient reassessed immediately prior to procedure    Patient location during procedure: OB  Performed By  Anesthesiologist: Princess Jenkins DO  Preanesthetic Checklist  Completed: patient identified, surgical consent, pre-op evaluation, timeout performed, IV checked, risks and benefits discussed and monitors and equipment checked  Prep:  Pt Position:sitting  Sterile Tech:cap, gloves, mask and sterile barrier  Prep:DuraPrep  Monitoring:blood pressure monitoring  Epidural Block Procedure:  Approach:midline  Guidance:palpation technique  Location:L3-L4  Needle Type:Tuohy  Needle Gauge:17 G  Loss of Resistance Medium: air  Loss of Resistance: 7cm  Cath Depth at skin:13 cm  Paresthesia: none  Aspiration:negative  Test Dose:negative  Number of Attempts: 1  Post Assessment:  Dressing:occlusive dressing applied and secured with tape  Pt Tolerance:patient tolerated the procedure well with no apparent complications  Complications:no

## 2018-12-02 NOTE — PROGRESS NOTES
[facility]  Obstetric Progress Note    Subjective     Patient:    Resting without complaints    Objective     Vital Signs Range for the last 24 hours  Temp:  [98 °F (36.7 °C)-98.3 °F (36.8 °C)] 98.3 °F (36.8 °C)   Temp src: Oral   BP: ()/(50-84) 98/53   Heart Rate:  [] 100   Resp:  [16] 16               Weight:  [109 kg (241 lb)] 109 kg (241 lb)       Intake/Output this shift:    No intake/output data recorded.    Physical Exam:      Abdomen Abdominal exam: soft, nontender, nondistended, no masses or organomegaly.   Extremities Exam of extremities: peripheral pulses normal, no pedal edema, no clubbing or cyanosis     Presentation: vertex   Cervix: Exam by: Method: sterile exam per CNM   Dilation:   4   Effacement: Cervical Effacement: 80%   Station:   -1         Fetal Heart Rate Assessment   Method: Fetal HR Assessment Method: external   Beats/min: Fetal HR (beats/min): 135   Baseline: Fetal Heart Baseline Rate: normal range   Varibility: Fetal HR Variability: moderate (amplitude range 6 to 25 bpm)   Accels: Fetal HR Accelerations: greater than/equal to 15 bpm, lasting at least 15 seconds   Decels: Fetal HR Decelerations: absent   Tracing Category:       Uterine Assessment   Method: Method: external tocotransducer   Frequency (min): Contraction Frequency (Minutes): 2-3   Ctx Count in 10 min:     Duration:     Intensity: Contraction Intensity: mild by palpation   Intensity by IUPC:     Resting Tone: Uterine Resting Tone: soft by palpation   Resting Tone by IUPC:     [place] Units:         Assessment/Plan       Term pregnancy        Assessment:  1.  Intrauterine pregnancy at 39w1d weeks gestation with reactive fetal status.    2.  IOL  3.  Obstetrical history significant for is non-contributory.  4.  GBS status: No results found for: GBSANTIGEN  5. AROM with clear fluid  Plan:  1. Continue observation  2.  Repeat SVE every 2-4 hours or prn  3.   Plan of care has been reviewed with patient and she  verbalizes understanding  4.  Risks, benefits of treatment plan have been discussed.  5.  All questions have been answered.        Herlinda Self CNM  12/2/2018  7:28 AM

## 2018-12-02 NOTE — ANESTHESIA PREPROCEDURE EVALUATION
Anesthesia Evaluation     Patient summary reviewed and Nursing notes reviewed   NPO Solid Status: > 4 hours  NPO Liquid Status: > 4 hours           Airway   Mallampati: II  Neck ROM: full  No difficulty expected  Dental      Pulmonary - negative pulmonary ROS   Cardiovascular - negative cardio ROS        Neuro/Psych- negative ROS  GI/Hepatic/Renal/Endo - negative ROS     Musculoskeletal (-) negative ROS    Abdominal    Substance History - negative use     OB/GYN    (+) Pregnant,         Other - negative ROS       ROS/Med Hx Other: H/o SVT and ablation                  Anesthesia Plan    ASA 2     epidural     Anesthetic plan, all risks, benefits, and alternatives have been provided, discussed and informed consent has been obtained with: patient.

## 2018-12-03 LAB
BASOPHILS # BLD AUTO: 0.02 10*3/MM3 (ref 0–0.2)
BASOPHILS NFR BLD AUTO: 0.1 % (ref 0–1)
DEPRECATED RDW RBC AUTO: 44.8 FL (ref 37–54)
EOSINOPHIL # BLD AUTO: 0.04 10*3/MM3 (ref 0–0.3)
EOSINOPHIL NFR BLD AUTO: 0.2 % (ref 0–3)
ERYTHROCYTE [DISTWIDTH] IN BLOOD BY AUTOMATED COUNT: 15.2 % (ref 11.3–14.5)
HCT VFR BLD AUTO: 24.6 % (ref 34.5–44)
HGB BLD-MCNC: 7.6 G/DL (ref 11.5–15.5)
IMM GRANULOCYTES # BLD: 0.06 10*3/MM3 (ref 0–0.03)
IMM GRANULOCYTES NFR BLD: 0.4 % (ref 0–0.6)
LYMPHOCYTES # BLD AUTO: 1.95 10*3/MM3 (ref 0.6–4.8)
LYMPHOCYTES NFR BLD AUTO: 11.8 % (ref 24–44)
MCH RBC QN AUTO: 25 PG (ref 27–31)
MCHC RBC AUTO-ENTMCNC: 30.9 G/DL (ref 32–36)
MCV RBC AUTO: 80.9 FL (ref 80–99)
MONOCYTES # BLD AUTO: 1.09 10*3/MM3 (ref 0–1)
MONOCYTES NFR BLD AUTO: 6.6 % (ref 0–12)
NEUTROPHILS # BLD AUTO: 13.38 10*3/MM3 (ref 1.5–8.3)
NEUTROPHILS NFR BLD AUTO: 81.3 % (ref 41–71)
PLATELET # BLD AUTO: 187 10*3/MM3 (ref 150–450)
PMV BLD AUTO: 11.1 FL (ref 6–12)
RBC # BLD AUTO: 3.04 10*6/MM3 (ref 3.89–5.14)
WBC NRBC COR # BLD: 16.48 10*3/MM3 (ref 3.5–10.8)

## 2018-12-03 PROCEDURE — 85025 COMPLETE CBC W/AUTO DIFF WBC: CPT | Performed by: OBSTETRICS & GYNECOLOGY

## 2018-12-03 RX ORDER — OXYCODONE HYDROCHLORIDE AND ACETAMINOPHEN 5; 325 MG/1; MG/1
2 TABLET ORAL EVERY 4 HOURS PRN
Status: DISCONTINUED | OUTPATIENT
Start: 2018-12-03 | End: 2018-12-05 | Stop reason: HOSPADM

## 2018-12-03 RX ORDER — FERROUS SULFATE 325(65) MG
325 TABLET ORAL 2 TIMES DAILY WITH MEALS
Status: DISCONTINUED | OUTPATIENT
Start: 2018-12-03 | End: 2018-12-05 | Stop reason: HOSPADM

## 2018-12-03 RX ORDER — PRENATAL VIT/IRON FUM/FOLIC AC 27MG-0.8MG
1 TABLET ORAL DAILY
Status: DISCONTINUED | OUTPATIENT
Start: 2018-12-03 | End: 2018-12-05 | Stop reason: HOSPADM

## 2018-12-03 RX ORDER — SIMETHICONE 80 MG
80 TABLET,CHEWABLE ORAL 4 TIMES DAILY
Status: DISCONTINUED | OUTPATIENT
Start: 2018-12-03 | End: 2018-12-05 | Stop reason: HOSPADM

## 2018-12-03 RX ORDER — CALCIUM CARBONATE 200(500)MG
2 TABLET,CHEWABLE ORAL 3 TIMES DAILY PRN
Status: DISCONTINUED | OUTPATIENT
Start: 2018-12-03 | End: 2018-12-05 | Stop reason: HOSPADM

## 2018-12-03 RX ORDER — DIPHENHYDRAMINE HCL 25 MG
25 CAPSULE ORAL EVERY 4 HOURS PRN
Status: DISCONTINUED | OUTPATIENT
Start: 2018-12-03 | End: 2018-12-05 | Stop reason: HOSPADM

## 2018-12-03 RX ORDER — LANOLIN 100 %
OINTMENT (GRAM) TOPICAL
Status: DISCONTINUED | OUTPATIENT
Start: 2018-12-03 | End: 2018-12-05 | Stop reason: HOSPADM

## 2018-12-03 RX ORDER — MORPHINE SULFATE 2 MG/ML
2 INJECTION, SOLUTION INTRAMUSCULAR; INTRAVENOUS EVERY 4 HOURS PRN
Status: DISCONTINUED | OUTPATIENT
Start: 2018-12-03 | End: 2018-12-05 | Stop reason: HOSPADM

## 2018-12-03 RX ORDER — DOCUSATE SODIUM 100 MG/1
100 CAPSULE, LIQUID FILLED ORAL 2 TIMES DAILY
Status: DISCONTINUED | OUTPATIENT
Start: 2018-12-03 | End: 2018-12-05 | Stop reason: HOSPADM

## 2018-12-03 RX ORDER — IBUPROFEN 600 MG/1
600 TABLET ORAL EVERY 6 HOURS PRN
Status: DISCONTINUED | OUTPATIENT
Start: 2018-12-03 | End: 2018-12-05 | Stop reason: HOSPADM

## 2018-12-03 RX ORDER — SIMETHICONE 80 MG
80 TABLET,CHEWABLE ORAL 4 TIMES DAILY PRN
Status: DISCONTINUED | OUTPATIENT
Start: 2018-12-03 | End: 2018-12-05 | Stop reason: HOSPADM

## 2018-12-03 RX ORDER — ALUMINA, MAGNESIA, AND SIMETHICONE 2400; 2400; 240 MG/30ML; MG/30ML; MG/30ML
15 SUSPENSION ORAL EVERY 4 HOURS PRN
Status: DISCONTINUED | OUTPATIENT
Start: 2018-12-03 | End: 2018-12-05 | Stop reason: HOSPADM

## 2018-12-03 RX ORDER — NALOXONE HCL 0.4 MG/ML
0.4 VIAL (ML) INJECTION
Status: DISCONTINUED | OUTPATIENT
Start: 2018-12-03 | End: 2018-12-05 | Stop reason: HOSPADM

## 2018-12-03 RX ORDER — ONDANSETRON 2 MG/ML
4 INJECTION INTRAMUSCULAR; INTRAVENOUS EVERY 6 HOURS PRN
Status: DISCONTINUED | OUTPATIENT
Start: 2018-12-03 | End: 2018-12-05 | Stop reason: HOSPADM

## 2018-12-03 RX ORDER — ONDANSETRON 4 MG/1
4 TABLET, FILM COATED ORAL EVERY 6 HOURS PRN
Status: DISCONTINUED | OUTPATIENT
Start: 2018-12-03 | End: 2018-12-05 | Stop reason: HOSPADM

## 2018-12-03 RX ADMIN — Medication 325 MG: at 20:10

## 2018-12-03 RX ADMIN — HYDROCODONE BITARTRATE AND ACETAMINOPHEN 1 TABLET: 5; 325 TABLET ORAL at 05:22

## 2018-12-03 RX ADMIN — IBUPROFEN 600 MG: 600 TABLET ORAL at 08:20

## 2018-12-03 RX ADMIN — PRENATAL VIT W/ FE FUMARATE-FA TAB 27-0.8 MG 1 TABLET: 27-0.8 TAB at 08:20

## 2018-12-03 RX ADMIN — SIMETHICONE CHEW TAB 80 MG 80 MG: 80 TABLET ORAL at 20:10

## 2018-12-03 RX ADMIN — Medication 325 MG: at 09:56

## 2018-12-03 RX ADMIN — IBUPROFEN 600 MG: 600 TABLET ORAL at 13:46

## 2018-12-03 RX ADMIN — DOCUSATE SODIUM 100 MG: 100 CAPSULE, LIQUID FILLED ORAL at 08:20

## 2018-12-03 RX ADMIN — DOCUSATE SODIUM 100 MG: 100 CAPSULE, LIQUID FILLED ORAL at 20:10

## 2018-12-03 RX ADMIN — IBUPROFEN 600 MG: 600 TABLET ORAL at 20:10

## 2018-12-03 RX ADMIN — IBUPROFEN 600 MG: 600 TABLET ORAL at 01:01

## 2018-12-03 RX ADMIN — MEASLES, MUMPS, AND RUBELLA VIRUS VACCINE LIVE 0.5 ML: 1000; 12500; 1000 INJECTION, POWDER, LYOPHILIZED, FOR SUSPENSION SUBCUTANEOUS at 13:46

## 2018-12-03 RX ADMIN — OXYCODONE HYDROCHLORIDE AND ACETAMINOPHEN 1 TABLET: 5; 325 TABLET ORAL at 21:57

## 2018-12-03 RX ADMIN — POLYETHYLENE GLYCOL 3350 17 G: 17 POWDER, FOR SOLUTION ORAL at 20:10

## 2018-12-03 RX ADMIN — OXYCODONE HYDROCHLORIDE AND ACETAMINOPHEN 1 TABLET: 5; 325 TABLET ORAL at 09:56

## 2018-12-03 NOTE — L&D DELIVERY NOTE
Primary Low Transverse  Section Procedure Note    Soni Zarate    2018     Indications: 1. IUP at 39w1d   2. Failure to progress    Pre-operative Diagnosis: 39w1d    Post-operative Diagnosis: same    Findings: VFI in OP vertex presentation; normal appearing uterus, tubes, and ovaries    Birth Information  YOB: 2020   Time of birth: 8:30 PM   Delivering clinician: Caitlin Martinez   Sex: female   Delivery type: , Low Transverse   Breech type (if applicable):     Observed anomalies/comments:         Estimated Blood Loss:  800cc           Drains: Alonso, 300cc clear urine                 Specimens: placenta (not sent to pathology)               Complications:  None; patient tolerated the procedure well.           Disposition: PACU - hemodynamically stable.           Condition: stable    Surgeon: Caitlin Martinez MD     Assistants: Dany Metcalf MD    Anesthesia: Spinal anesthesia    ASA Class: 2    Procedure Details   The patient was seen in the Holding Room. The risks, benefits, complications, treatment options, and expected outcomes were discussed with the patient.  The patient concurred with the proposed plan, giving informed consent.  The site of surgery was properly noted. The patient was taken to the Operating Room, identified as Soni Zarate and the procedure verified as  Delivery. A Time Out was held and the above information confirmed.    The patient was taken to the operating room where spinal anesthesia was placed and was found to be adequate. She was prepped and draped in the usual sterile fashion in the dorsal supine position with a leftward tilt. A Pfannenstiel skin incision was made with the scalpel and carried through to the underlying layer of fascia using the bovie. The fascia was then nicked in the midline, and the fascial incision was extended laterally. The superior aspect of the fascial incision was then grasped with Kochers, elevated, and the  underlying rectus muscles were dissected off bluntly and sharply. In a similar fashion, the inferior aspect of the fascial incision was grasped with the Kochers, elevated, and the underlying rectus muscles were dissected off bluntly and sharply. The rectus muscles were then  in the midline and the peritoneum was identified. The peritoneum was entered bluntly, and this incision was extended superiorly and inferiorly with good visualization of underlying structures.  The bladder blade was then inserted. The vesicouterine peritoneum was identified, grasped with the pickups, and entered sharply using the Metzenbaum scissors. The incision was extended laterally and a bladder flap was created digitally. The bladder blade was reinserted. The lower uterine segment was identified and a low transverse uterine incision was made using the scalpel. Delivered from vertex presentation was a [place] Measurements  Weight (oz): 131.75    Length (in): 20.5    Head circumference (in):      Chest circumference (in):      with apgars scores of         APGARS  One minute Five minutes Ten minutes Fifteen minutes Twenty minutes   Skin color: 0   1             Heart rate: 2   2             Grimace: 2   2              Muscle tone: 2   2              Breathin   2              Totals: 8   9              . The nose and mouth were suctioned, the cord was milked, clamped and cut, and the infant was handed off to the awaiting Delivery Room Team. Cord blood was then obtained. The placenta was removed intact and appeared normal. The uterus was then exteriorized from the abdominal cavity and cleared of all clots and debris. The uterine outline, tubes and ovaries appeared normal. The hysterotomy was then closed using 1 monocryl in a running, locked fashion. Two additional interrupted sutures of 1 monocryl were placed at the right apex. Hemostasis was observed.  The uterus was placed back inside the abdominal cavity, and the gutters were  cleared of clots and debris. The hysterotomy was again reexamined and excellent hemostasis continued to be noted. The fascia was then reapproximated with running sutures of 0 vicryl. The incision was then irrigated, and the subcutaneous tissue was reapproximated with 3-0 plain. The skin was reapproximated with 4-0 monocryl and Skin glue.    Instrument, sponge, and needle counts were correct prior the abdominal closure and at the conclusion of the case.         Caitlin Martinez MD  9:11 PM  12/2/2018

## 2018-12-03 NOTE — ANESTHESIA PROCEDURE NOTES
Spinal Block      Start Time: 12/2/2018 8:15 PM  Stop Time: 12/2/2018 8:16 PM  Performed By  Anesthesiologist: Dave Mac MD  Preanesthetic Checklist  Completed: patient identified, site marked, surgical consent, pre-op evaluation, timeout performed, IV checked, risks and benefits discussed and monitors and equipment checked  Spinal Block Prep:  Patient Position:sitting  Sterile Tech:cap, gloves and sterile barriers  Prep:Betadine  Patient Monitoring:EKG, continuous pulse oximetry and blood pressure monitoring  Spinal Block Procedure  Approach:midline  Guidance:palpation technique  Location:L2-L3  Needle Type:Juan  Needle Gauge:25 G  Placement of Spinal needle event:cerebrospinal fluid aspirated  Paresthesia: no  Fluid Appearance:clear     Post Assessment  Patient Tolerance:patient tolerated the procedure well with no apparent complications  Complications no

## 2018-12-03 NOTE — PROGRESS NOTES
12/3/2018    Name:Soni Zarate    MR#:[id number]     PROGRESS NOTE:  Post-Op 1 S/P        Subjective   23 y.o. yo Female  s/p CS at 39w1d doing well. Pain well controlled, lochia appropriate. She has not ambulated yet. Valdez catheter remains in place. Patient denies dizziness or light headedness.     Patient Active Problem List   Diagnosis   • Single liveborn, born in hospital, delivered by  section        Objective    Vitals  Temp:  Temp:  [97.9 °F (36.6 °C)-99.9 °F (37.7 °C)] 97.9 °F (36.6 °C)  Temp src: Oral  BP:  BP: (100-145)/(54-84) 115/63  Pulse:  Heart Rate:  [] 87  RR:   Resp:  [16-20] 16    General Awake, alert, no distress  Abdomen Soft, non-distended, fundus firm, below umbilicus, appropriately tender  Incision  Intact, no erythema or exudate  Extremities Calves NT bilaterally     I/O last 3 completed shifts:  In: 1250 [I.V.:1250]  Out: 4925 [Urine:3325; Blood:1600]    Lab Results   Component Value Date    WBC 16.48 (H) 2018    HGB 7.6 (L) 2018    HCT 24.6 (L) 2018    MCV 80.9 2018     2018    AST 26 2018    ALT 31 2018    HEPBSAG Non-Reactive 2018     Results from last 7 days   Lab Units  18   2323   ABO TYPING   O   RH TYPING   Positive   ANTIBODY SCREEN   Negative       Infant: female       Assessment   1.  POD 1 from  Section  2. Postpartum anemia  Plan: Doing well.   Discontinue valdez   D/C iv, advance diet, may shower.  Start Iron  Repeat hgb/hct in am        Ju Vinson CNM  12/3/2018 8:44 AM

## 2018-12-03 NOTE — ANESTHESIA POSTPROCEDURE EVALUATION
Patient: Soni Zarate    Procedure Summary     Date:  18 Room / Location:  [facility] LABOR DELIVERY   [facility] LABOR DELIVERY    Anesthesia Start:  216 Anesthesia Stop:      Procedure:   SECTION PRIMARY (N/A Abdomen) Diagnosis:      Surgeon:  Caitlin Martinez MD Provider:  Dave Mac MD    Anesthesia Type:  epidural ASA Status:  2          Anesthesia Type: epidural  Last vitals  /64     Temp 99.1     Pulse 97     Resp 16     SpO2 98        Post Anesthesia Care and Evaluation    Patient location during evaluation: bedside  Patient participation: complete - patient participated  Level of consciousness: awake and alert  Pain score: 0  Pain management: adequate  Airway patency: patent  Anesthetic complications: No anesthetic complications    Cardiovascular status: acceptable  Respiratory status: acceptable  Hydration status: acceptable

## 2018-12-03 NOTE — OP NOTE
Primary Low Transverse  Section Procedure Note    Soni Zarate    2020     Indications: 1. IUP at 39w1d   2. Failure to progress    Pre-operative Diagnosis: 39w1d    Post-operative Diagnosis: same    Findings: VFI in OP vertex presentation; normal appearing uterus, tubes, and ovaries    Birth Information  YOB: 2020   Time of birth: 8:30 PM   Delivering clinician: Caitlin Martinez   Sex: female   Delivery type: , Low Transverse   Breech type (if applicable):     Observed anomalies/comments:         Estimated Blood Loss:  800cc           Drains: Alonso, 300cc clear urine                 Specimens: placenta (not sent to pathology)               Complications:  None; patient tolerated the procedure well.           Disposition: PACU - hemodynamically stable.           Condition: stable    Surgeon: Caitlin Martinez MD     Assistants: Dany Metcalf MD    Anesthesia: Spinal anesthesia    ASA Class: 2    Procedure Details   The patient was seen in the Holding Room. The risks, benefits, complications, treatment options, and expected outcomes were discussed with the patient.  The patient concurred with the proposed plan, giving informed consent.  The site of surgery was properly noted. The patient was taken to the Operating Room, identified as Soni Zarate and the procedure verified as  Delivery. A Time Out was held and the above information confirmed.    The patient was taken to the operating room where spinal anesthesia was placed and was found to be adequate. She was prepped and draped in the usual sterile fashion in the dorsal supine position with a leftward tilt. A Pfannenstiel skin incision was made with the scalpel and carried through to the underlying layer of fascia using the bovie. The fascia was then nicked in the midline, and the fascial incision was extended laterally. The superior aspect of the fascial incision was then grasped with Kochers, elevated, and the  underlying rectus muscles were dissected off bluntly and sharply. In a similar fashion, the inferior aspect of the fascial incision was grasped with the Kochers, elevated, and the underlying rectus muscles were dissected off bluntly and sharply. The rectus muscles were then  in the midline and the peritoneum was identified. The peritoneum was entered bluntly, and this incision was extended superiorly and inferiorly with good visualization of underlying structures.  The bladder blade was then inserted. The vesicouterine peritoneum was identified, grasped with the pickups, and entered sharply using the Metzenbaum scissors. The incision was extended laterally and a bladder flap was created digitally. The bladder blade was reinserted. The lower uterine segment was identified and a low transverse uterine incision was made using the scalpel. Delivered from vertex presentation was a [place] Measurements  Weight (oz): 131.75    Length (in): 20.5    Head circumference (in):      Chest circumference (in):      with apgars scores of         APGARS  One minute Five minutes Ten minutes Fifteen minutes Twenty minutes   Skin color: 0   1             Heart rate: 2   2             Grimace: 2   2              Muscle tone: 2   2              Breathin   2              Totals: 8   9              . The nose and mouth were suctioned, the cord was milked, clamped and cut, and the infant was handed off to the awaiting Delivery Room Team. Cord blood was then obtained. The placenta was removed intact and appeared normal. The uterus was then exteriorized from the abdominal cavity and cleared of all clots and debris. The uterine outline, tubes and ovaries appeared normal. The hysterotomy was then closed using 1 monocryl in a running, locked fashion. Two additional interrupted sutures of 1 monocryl were placed at the right apex. Hemostasis was observed.  The uterus was placed back inside the abdominal cavity, and the gutters were  cleared of clots and debris. The hysterotomy was again reexamined and excellent hemostasis continued to be noted. The fascia was then reapproximated with running sutures of 0 vicryl. The incision was then irrigated, and the subcutaneous tissue was reapproximated with 3-0 plain. The skin was reapproximated with 4-0 monocryl and Skin glue.    Instrument, sponge, and needle counts were correct prior the abdominal closure and at the conclusion of the case.         Caitlin Martinez MD  9:11 PM  12/2/2018

## 2018-12-03 NOTE — PROGRESS NOTES
12/3/2018    Name:Soni Zarate    MR#:[id number]     PROGRESS NOTE:  Post-Op 1 S/P        Subjective   23 y.o. yo Female  s/p CS at 39w1d doing well. Pain well controlled, lochia appropriate    Patient Active Problem List   Diagnosis   • Single liveborn, born in hospital, delivered by  section        Objective    Vitals  Temp:  Temp:  [97.9 °F (36.6 °C)-99.9 °F (37.7 °C)] 97.9 °F (36.6 °C)  Temp src: Oral  BP:  BP: (100-145)/(54-84) 115/63  Pulse:  Heart Rate:  [] 87  RR:   Resp:  [16-20] 16    General Awake, alert, no distress  Abdomen Soft, non-distended, fundus firm, below umbilicus, appropriately tender  Incision  Intact, no erythema or exudate  Extremities Calves NT bilaterally     I/O last 3 completed shifts:  In: 1250 [I.V.:1250]  Out: 4925 [Urine:3325; Blood:1600]    Lab Results   Component Value Date    WBC 16.48 (H) 2018    HGB 7.6 (L) 2018    HCT 24.6 (L) 2018    MCV 80.9 2018     2018    AST 26 2018    ALT 31 2018    HEPBSAG Non-Reactive 2018     Results from last 7 days   Lab Units  18   2323   ABO TYPING   O   RH TYPING   Positive   ANTIBODY SCREEN   Negative       Infant: female       Assessment   1.  POD 1 from  Section    Plan: Doing well.    D/C iv, advance diet, may shower.          Ju Vinson CNM  12/3/2018 8:42 AM

## 2018-12-03 NOTE — LACTATION NOTE
12/03/18 0730   Maternal Information   Date of Referral 12/03/18   Person Making Referral other (see comments)  (courtesy)   Maternal Reason for Referral breastfeeding currently   Maternal Assessment   Breast Size Issue none   Breast Shape Bilateral:;round   Breast Density Bilateral:;soft   Nipples Bilateral:;everted   Maternal Infant Feeding   Maternal Emotional State anxious;assist needed   Infant Positioning clutch/football   Signs of Milk Transfer audible swallow;infant jaw motion present   Pain with Feeding no   Comfort Measures Before/During Feeding infant position adjusted;maternal position adjusted;latch adjusted   Latch Assistance yes   Equipment Type   Breast Pump Type other (see comments)  (Rx given, encouraged aeroflow d/t anthem)   Reproductive Interventions   Breast Care: Breastfeeding frequency of feeding adjusted   Breastfeeding Assistance assisted with positioning;feeding cue recognition promoted;feeding on demand promoted;feeding session observed;infant latch-on verified;support offered;infant stimulated to wakeful state;infant suck/swallow verified   Breastfeeding Support encouragement provided;lactation counseling provided;diary/feeding log utilized

## 2018-12-03 NOTE — ANESTHESIA POSTPROCEDURE EVALUATION
Patient: Soni Zarate    Procedure Summary     Date:  18 Room / Location:  [facility] DELIVERY   [facility]    Anesthesia Start:  216 Anesthesia Stop:      Procedure:   SECTION PRIMARY (N/A Abdomen) Diagnosis:      Surgeon:  Caitlin Martinez MD Provider:  Dave Mac MD    Anesthesia Type:  epidural ASA Status:  2          Anesthesia Type: epidural  Last vitals  BP   115/63 (18 0800)   Temp   97.9 °F (36.6 °C) (18 0800)   Pulse   87 (18 0800)   Resp   16 (18 0800)     SpO2   97 % (18)     Post Anesthesia Care and Evaluation    Patient location during evaluation: bedside  Patient participation: complete - patient participated  Level of consciousness: awake and alert  Pain management: adequate  Airway patency: patent  Anesthetic complications: No anesthetic complications    Cardiovascular status: acceptable  Respiratory status: acceptable  Hydration status: acceptable  Post Neuraxial Block status: Motor and sensory function returned to baseline and No signs or symptoms of PDPH

## 2018-12-03 NOTE — LACTATION NOTE
This note was copied from a baby's chart.     12/03/18 0765   Nutrition   Feeding Readiness Cues rooting   Feeding Method breastfeeding   Feeding Tolerance/Success arousal required;coordinated suck;coordinated swallow   Feeding Interventions latch assistance provided;arousal required;sucking promoted   Additional Documentation LATCH Score (Group)   Breastfeeding Session   Breastfeeding Time, Left (min) 17   Breastfeeding Time, Right (min) 4   Breastfeeding breastfeeding, bilateral   Infant Positioning clutch/football   Effective Latch During Feeding yes   Suck/Swallow Coordination present   Signs of Milk Transfer audible swallow;infant jaw motion present   LATCH Score   Latch 2-->grasps breast, tongue down, lips flanged, rhythmic sucking   Audible Swallowing 1-->a few with stimulation   Type of Nipple 2-->everted (after stimulation)   Comfort (Breast/Nipple) 2-->soft/nontender   Hold (Positioning) 1-->minimal assist, teach one side, mother does other, staff holds   Latch Score 8   Safety   Infant location with mother, in her room   Electronic Transponder Number [id number]   Identification Band Number [id number] x2

## 2018-12-04 LAB
HCT VFR BLD AUTO: 25.9 % (ref 34.5–44)
HGB BLD-MCNC: 7.8 G/DL (ref 11.5–15.5)

## 2018-12-04 PROCEDURE — 85014 HEMATOCRIT: CPT | Performed by: NURSE PRACTITIONER

## 2018-12-04 PROCEDURE — 85018 HEMOGLOBIN: CPT | Performed by: NURSE PRACTITIONER

## 2018-12-04 RX ADMIN — OXYCODONE HYDROCHLORIDE AND ACETAMINOPHEN 1 TABLET: 5; 325 TABLET ORAL at 10:20

## 2018-12-04 RX ADMIN — DOCUSATE SODIUM 100 MG: 100 CAPSULE, LIQUID FILLED ORAL at 20:42

## 2018-12-04 RX ADMIN — Medication 325 MG: at 08:16

## 2018-12-04 RX ADMIN — OXYCODONE HYDROCHLORIDE AND ACETAMINOPHEN 1 TABLET: 5; 325 TABLET ORAL at 04:48

## 2018-12-04 RX ADMIN — OXYCODONE HYDROCHLORIDE AND ACETAMINOPHEN 2 TABLET: 5; 325 TABLET ORAL at 21:58

## 2018-12-04 RX ADMIN — DOCUSATE SODIUM 100 MG: 100 CAPSULE, LIQUID FILLED ORAL at 08:16

## 2018-12-04 RX ADMIN — Medication 325 MG: at 20:42

## 2018-12-04 RX ADMIN — SIMETHICONE CHEW TAB 80 MG 80 MG: 80 TABLET ORAL at 20:43

## 2018-12-04 RX ADMIN — IBUPROFEN 600 MG: 600 TABLET ORAL at 17:28

## 2018-12-04 RX ADMIN — IBUPROFEN 600 MG: 600 TABLET ORAL at 10:20

## 2018-12-04 RX ADMIN — SIMETHICONE CHEW TAB 80 MG 80 MG: 80 TABLET ORAL at 08:17

## 2018-12-04 RX ADMIN — OXYCODONE HYDROCHLORIDE AND ACETAMINOPHEN 1 TABLET: 5; 325 TABLET ORAL at 17:28

## 2018-12-04 RX ADMIN — IBUPROFEN 600 MG: 600 TABLET ORAL at 01:58

## 2018-12-04 RX ADMIN — SIMETHICONE CHEW TAB 80 MG 80 MG: 80 TABLET ORAL at 17:28

## 2018-12-04 RX ADMIN — PRENATAL VIT W/ FE FUMARATE-FA TAB 27-0.8 MG 1 TABLET: 27-0.8 TAB at 08:16

## 2018-12-04 NOTE — LACTATION NOTE
Mom unsure if she wants to breastfeed, pump and bottle feed, or formula feed.  Presented mom with all options and education.  Mom given small nipple shield and demonstrated application.  Encouraged mom to call for breast pump today.  Mom will follow-up with lactation about decision for how she would like to feed baby.

## 2018-12-05 VITALS
TEMPERATURE: 98 F | BODY MASS INDEX: 34.5 KG/M2 | WEIGHT: 241 LBS | OXYGEN SATURATION: 97 % | HEIGHT: 70 IN | DIASTOLIC BLOOD PRESSURE: 73 MMHG | HEART RATE: 100 BPM | RESPIRATION RATE: 16 BRPM | SYSTOLIC BLOOD PRESSURE: 117 MMHG

## 2018-12-05 RX ORDER — IBUPROFEN 600 MG/1
600 TABLET ORAL EVERY 6 HOURS PRN
Qty: 60 TABLET | Refills: 1 | Status: SHIPPED | OUTPATIENT
Start: 2018-12-05 | End: 2023-03-01

## 2018-12-05 RX ORDER — OXYCODONE HYDROCHLORIDE AND ACETAMINOPHEN 5; 325 MG/1; MG/1
2 TABLET ORAL EVERY 6 HOURS PRN
Qty: 24 TABLET | Refills: 0 | Status: SHIPPED | OUTPATIENT
Start: 2018-12-05 | End: 2018-12-13

## 2018-12-05 RX ADMIN — PRENATAL VIT W/ FE FUMARATE-FA TAB 27-0.8 MG 1 TABLET: 27-0.8 TAB at 08:19

## 2018-12-05 RX ADMIN — IBUPROFEN 600 MG: 600 TABLET ORAL at 05:32

## 2018-12-05 RX ADMIN — Medication 325 MG: at 08:19

## 2018-12-05 RX ADMIN — OXYCODONE HYDROCHLORIDE AND ACETAMINOPHEN 2 TABLET: 5; 325 TABLET ORAL at 05:32

## 2018-12-05 RX ADMIN — DOCUSATE SODIUM 100 MG: 100 CAPSULE, LIQUID FILLED ORAL at 08:19

## 2018-12-05 RX ADMIN — SIMETHICONE CHEW TAB 80 MG 80 MG: 80 TABLET ORAL at 08:19

## 2018-12-05 NOTE — DISCHARGE INSTR - APPOINTMENTS
A two week follow up appointment has been made for you to see Herlinda Self on 12-19-18 at 10:00 am

## 2018-12-05 NOTE — DISCHARGE SUMMARY
Johanna   Discharge Summary      Patient: Soni Zarate      MR#:[id number]  Admission  Diagnosis: Term Pregnancy  Discharge Diagnosis:  section    Date of Admission: 2018  Date of Discharge:  2018    Procedures:  , Low Transverse     2018    8:30 PM      Service:  Obstetrics    Hospital Course:  Patient underwent  section and remained in the hospital for 3 days.  During that time she remained afebrile and hemodynamically stable.  On the day of discharge, she was eating, ambulating and voiding without difficulty.        Labs:    Lab Results (last 24 hours)     ** No results found for the last 24 hours. **          Discharge Medications     Discharge Medications      New Medications      Instructions Start Date   ibuprofen 600 MG tablet  Commonly known as:  ADVIL,MOTRIN   600 mg, Oral, Every 6 Hours PRN      oxyCODONE-acetaminophen 5-325 MG per tablet  Commonly known as:  PERCOCET   2 tablets, Oral, Every 6 Hours PRN         Continue These Medications      Instructions Start Date   prenatal (CLASSIC) vitamin 28-0.8 MG tablet tablet   Oral, Daily         Stop These Medications    promethazine 25 MG tablet  Commonly known as:  PHENERGAN            Discharge Disposition:  To Home    Discharge Condition:  Stable    Discharge Diet: regular    Activity at Discharge: pelvic rest    Follow-up Appointments  2 weeks with Aramis Self CNM  18  9:00 AM

## 2018-12-05 NOTE — PLAN OF CARE
Problem: Patient Care Overview  Goal: Plan of Care Review  Outcome: Ongoing (interventions implemented as appropriate)   18 0638   Coping/Psychosocial   Plan of Care Reviewed With patient   Plan of Care Review   Progress improving   OTHER   Outcome Summary pain management good with Percocet 10 and Motrin, breastfeeding well, good latch, voiding without problems, anticipate d/c today       Problem: Postpartum ( Delivery) (Adult,Obstetrics,Pediatric)  Goal: Signs and Symptoms of Listed Potential Problems Will be Absent, Minimized or Managed (Postpartum)  Outcome: Ongoing (interventions implemented as appropriate)      Problem: Breastfeeding (Adult,Obstetrics,Pediatric)  Goal: Signs and Symptoms of Listed Potential Problems Will be Absent, Minimized or Managed (Breastfeeding)  Outcome: Ongoing (interventions implemented as appropriate)

## 2018-12-05 NOTE — PLAN OF CARE
Problem: Patient Care Overview  Goal: Plan of Care Review  Outcome: Outcome(s) achieved Date Met: 18    Goal: Individualization and Mutuality  Outcome: Outcome(s) achieved Date Met: 18    Goal: Discharge Needs Assessment  Outcome: Outcome(s) achieved Date Met: 18      Problem: Postpartum ( Delivery) (Adult,Obstetrics,Pediatric)  Goal: Signs and Symptoms of Listed Potential Problems Will be Absent, Minimized or Managed (Postpartum)  Outcome: Outcome(s) achieved Date Met: 18    Goal: Anesthesia/Sedation Recovery  Outcome: Outcome(s) achieved Date Met: 18      Problem: Breastfeeding (Adult,Obstetrics,Pediatric)  Goal: Signs and Symptoms of Listed Potential Problems Will be Absent, Minimized or Managed (Breastfeeding)  Outcome: Outcome(s) achieved Date Met: 18

## 2018-12-05 NOTE — PROGRESS NOTES
DREW Spencer   PROGRESS NOTE      Subjective     Patient reports:   Doing well, pain controlled with medication, ambulating around the room, mesha po    Objective      Vitals: Vital Signs Range for the last 24 hours  Temperature: Temp:  [98 °F (36.7 °C)-98.7 °F (37.1 °C)] 98 °F (36.7 °C)   Temp Source: Temp src: Oral   BP: BP: (115-133)/(62-76) 117/73   Pulse: Heart Rate:  [] 100   Respirations: Resp:  [14-18] 16   SPO2:     O2 Amount (l/min):     O2 Devices            Physical Exam    Lungs clear to auscultation bilaterally   Abdomen Soft, non-tender, normal bowel sounds; no bruits, organomegaly or masses.   Incision  healing well, no drainage, no erythema, no hernia, no seroma, no swelling, well approximated   Extremities extremities normal, atraumatic, no cyanosis or edema     LABS:  Lab Results   Component Value Date    WBC 16.48 (H) 2018    HGB 7.8 (L) 2018    HCT 25.9 (L) 2018    MCV 80.9 2018     2018       Assessment/Plan        Single liveborn, born in hospital, delivered by  section      Assessment:    Soni Zarate is Day 3  post-partum        Plan:  plan for discharge today.        Herlinda Self CNM  2018  8:57 AM

## 2018-12-05 NOTE — PROGRESS NOTES
DREW Spencer   PROGRESS NOTE      Subjective     Patient reports:   Doing well, pain controlled with medication, ambulating around the room, mesha po    Objective      Vitals: Vital Signs Range for the last 24 hours  Temperature: Temp:  [98 °F (36.7 °C)-98.7 °F (37.1 °C)] 98 °F (36.7 °C)   Temp Source: Temp src: Oral   BP: BP: (115-133)/(62-76) 117/73   Pulse: Heart Rate:  [] 100   Respirations: Resp:  [14-18] 16   SPO2:     O2 Amount (l/min):     O2 Devices            Physical Exam    Lungs clear to auscultation bilaterally   Abdomen Soft, non-tender, normal bowel sounds; no bruits, organomegaly or masses.   Incision  healing well, no drainage, no erythema, no hernia, no seroma, no swelling, well approximated   Extremities extremities normal, atraumatic, no cyanosis or edema     LABS:  Lab Results   Component Value Date    WBC 16.48 (H) 2018    HGB 7.8 (L) 2018    HCT 25.9 (L) 2018    MCV 80.9 2018     2018       Assessment/Plan        Single liveborn, born in hospital, delivered by  section      Assessment:    Soni Zarate is Day 2  post-partum        Plan:  continue post op care.        Herlinda Self CNM  2018  8:48 AM

## 2021-01-21 ENCOUNTER — OFFICE VISIT (OUTPATIENT)
Dept: INTERNAL MEDICINE | Facility: CLINIC | Age: 26
End: 2021-01-21

## 2021-01-21 VITALS
HEIGHT: 70 IN | HEART RATE: 89 BPM | SYSTOLIC BLOOD PRESSURE: 110 MMHG | TEMPERATURE: 98 F | OXYGEN SATURATION: 98 % | RESPIRATION RATE: 17 BRPM | WEIGHT: 230.6 LBS | DIASTOLIC BLOOD PRESSURE: 70 MMHG | BODY MASS INDEX: 33.01 KG/M2

## 2021-01-21 DIAGNOSIS — F41.1 GENERALIZED ANXIETY DISORDER WITH PANIC ATTACKS: Primary | ICD-10-CM

## 2021-01-21 DIAGNOSIS — G43.009 MIGRAINE WITHOUT AURA AND WITHOUT STATUS MIGRAINOSUS, NOT INTRACTABLE: ICD-10-CM

## 2021-01-21 DIAGNOSIS — F41.0 GENERALIZED ANXIETY DISORDER WITH PANIC ATTACKS: Primary | ICD-10-CM

## 2021-01-21 DIAGNOSIS — E66.9 OBESITY (BMI 30.0-34.9): ICD-10-CM

## 2021-01-21 DIAGNOSIS — F33.9 RECURRENT MAJOR DEPRESSIVE DISORDER, REMISSION STATUS UNSPECIFIED (HCC): ICD-10-CM

## 2021-01-21 PROCEDURE — 99204 OFFICE O/P NEW MOD 45 MIN: CPT | Performed by: PHYSICIAN ASSISTANT

## 2021-01-21 RX ORDER — RIZATRIPTAN BENZOATE 10 MG/1
TABLET ORAL
Qty: 12 TABLET | Refills: 2 | Status: SHIPPED | OUTPATIENT
Start: 2021-01-21 | End: 2021-04-06

## 2021-01-21 RX ORDER — CITALOPRAM 10 MG/1
10 TABLET ORAL DAILY
Qty: 30 TABLET | Refills: 2 | Status: SHIPPED | OUTPATIENT
Start: 2021-01-21 | End: 2021-02-04 | Stop reason: DRUGHIGH

## 2021-02-04 ENCOUNTER — TELEMEDICINE (OUTPATIENT)
Dept: PSYCHIATRY | Facility: CLINIC | Age: 26
End: 2021-02-04

## 2021-02-04 DIAGNOSIS — F41.1 GENERALIZED ANXIETY DISORDER: Primary | Chronic | ICD-10-CM

## 2021-02-04 DIAGNOSIS — F33.1 MAJOR DEPRESSIVE DISORDER, RECURRENT EPISODE, MODERATE (HCC): Chronic | ICD-10-CM

## 2021-02-04 DIAGNOSIS — G47.9 SLEEP DIFFICULTIES: Chronic | ICD-10-CM

## 2021-02-04 PROCEDURE — 90792 PSYCH DIAG EVAL W/MED SRVCS: CPT | Performed by: NURSE PRACTITIONER

## 2021-02-04 RX ORDER — HYDROXYZINE HYDROCHLORIDE 25 MG/1
TABLET, FILM COATED ORAL
Qty: 180 TABLET | Refills: 0 | Status: SHIPPED | OUTPATIENT
Start: 2021-02-04 | End: 2022-06-10 | Stop reason: SDUPTHER

## 2021-02-04 RX ORDER — CITALOPRAM 20 MG/1
20 TABLET ORAL DAILY
Qty: 30 TABLET | Refills: 0 | Status: SHIPPED | OUTPATIENT
Start: 2021-02-04 | End: 2021-03-04

## 2021-02-04 NOTE — TREATMENT PLAN
Multi-Disciplinary Problems (from Behavioral Health Treatment Plan)    Active Problems     Problem: Anxiety  Start Date: 02/04/21    Problem Details: The patient self-scales this problem as a 10 with 10 being the worst.        Goal Priority Start Date Expected End Date End Date    Patient will develop and implement behavioral and cognitive strategies to reduce anxiety and irrational fears. -- 02/04/21 -- --    Goal Details: Progress toward goal:  Not appropriate to rate progress toward goal since this is the initial treatment plan.        Goal Intervention Frequency Start Date End Date    Help patient explore past emotional issues in relation to present anxiety. Q Month 02/04/21 --    Intervention Details: Duration of treatment until until remission of symptoms.        Goal Intervention Frequency Start Date End Date    Help patient develop an awareness of their cognitive and physical responses to anxiety. Q Month 02/04/21 --    Intervention Details: Duration of treatment until until remission of symptoms.              Problem: Depression  Start Date: 02/04/21    Problem Details: The patient self-scales this problem as a 7 with 10 being the worst.        Goal Priority Start Date Expected End Date End Date    Patient will demonstrate the ability to initiate new constructive life skills outside of sessions on a consistent basis. -- 02/04/21 -- --    Goal Details: Progress toward goal:  Not appropriate to rate progress toward goal since this is the initial treatment plan.        Goal Intervention Frequency Start Date End Date    Assist patient in setting attainable activities of daily living goals. PRN 02/04/21 --    Goal Intervention Frequency Start Date End Date    Provide education about depression Q Month 02/04/21 --    Intervention Details: Duration of treatment until until remission of symptoms.        Goal Intervention Frequency Start Date End Date    Assist patient in developing healthy coping strategies. Q  Month 02/04/21 --    Intervention Details: Duration of treatment until until remission of symptoms.                           I have discussed and reviewed this treatment plan with the patient and/or guardian.  The patient has verbally agreed with this treatment plan (no signatures are obtained at today's visit as the patient is a telehealth patient and is unable to print and sign this document, therefore verbal agreement is obtained).  .
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
## 2021-02-18 PROBLEM — Z98.890 HISTORY OF CARDIAC RADIOFREQUENCY ABLATION: Status: ACTIVE | Noted: 2021-02-18

## 2021-03-04 ENCOUNTER — TELEMEDICINE (OUTPATIENT)
Dept: PSYCHIATRY | Facility: CLINIC | Age: 26
End: 2021-03-04

## 2021-03-04 DIAGNOSIS — G47.9 SLEEP DIFFICULTIES: Chronic | ICD-10-CM

## 2021-03-04 DIAGNOSIS — F33.1 MAJOR DEPRESSIVE DISORDER, RECURRENT EPISODE, MODERATE (HCC): Chronic | ICD-10-CM

## 2021-03-04 DIAGNOSIS — F41.1 GENERALIZED ANXIETY DISORDER: Primary | Chronic | ICD-10-CM

## 2021-03-04 PROCEDURE — 99214 OFFICE O/P EST MOD 30 MIN: CPT | Performed by: NURSE PRACTITIONER

## 2021-03-04 RX ORDER — VENLAFAXINE HYDROCHLORIDE 75 MG/1
75 CAPSULE, EXTENDED RELEASE ORAL DAILY
Qty: 30 CAPSULE | Refills: 0 | Status: SHIPPED | OUTPATIENT
Start: 2021-03-04 | End: 2022-01-14

## 2021-03-04 NOTE — PROGRESS NOTES
"This provider is located at the [facility] (through [facility]), [address], [place], [address] using a secure [employer] Video Visit through [employer]. Patient is being seen remotely via telehealth at their home address in [place], and stated they are in a secure environment for this session. The patient's condition being diagnosed/treated is appropriate for telemedicine. The provider identified herself as well as her credentials.   The patient, and/or patients guardian, consent to be seen remotely, and when consent is given they understand that the consent allows for patient identifiable information to be sent to a third party as needed.   They may refuse to be seen remotely at any time. The electronic data is encrypted and password protected, and the patient and/or guardian has been advised of the potential risks to privacy not withstanding such measures.    You have chosen to receive care through a telehealth visit.  Do you consent to use a video/audio connection for your medical care today? Yes        Subjective   Soni Zarate is a 25 y.o. female who presents today for follow up    Chief Complaint:  Anxiety and Depression    Accompanied by: Pt was alone during evaluation.    History of Present Illness:   \"I've been taking it one day at a time.\" Pt is anxious because she had custody arrangement and child support documents completed. She is worried about how her ex is going to do once he finds out that he has to pay over $700/month in child support. She states his anger can be \"out of control\". In the documents it also states he owes her half of what he took out of their bank account. Pt has been having more \"down\" days than good days. Pt states that Celexa may be making her mood worse. Pt is only getting about 5-6 hours of sleep on average and doesn't feel rested. Pt is trying to get to the gym more often, but childcare is still an issue. Pt had an anxiety " attack while at work today, she had to leave early. Pt states that Vistaril 25 mg helps with anxiety, but 50 mg is too much. Pt has lost 20 lbs since January. Pt only eats about once daily. The patient denies any abnormal muscle movements or tics. The patient rates her depression at a 7/10 on a 0-10 scale, with 10 being the worst. The patient rates her anxiety at a 10/10 on a 0-10 scale, with 10 being the worst. The patient would like to change her medications at this visit. The patient denies any suicidal or homicidal ideations, plans, or intent at today's encounter and is convincing. The patient denies any auditory hallucinations or visual hallucinations. The patient does not endorse any significant symptoms consistent with kerry or psychosis at today's encounter.      Prior Psychiatric Medications:  Celexa - made pt feel worse  Zoloft - ineffective        The following portions of the patient's history were reviewed and updated as appropriate: allergies, current medications, past family history, past medical history, past social history, past surgical history and problem list.          Past Medical History:  Past Medical History:   Diagnosis Date   • Abnormal Pap smear of cervix     HPV   • Anemia    • Anxiety    • Depression    • Kidney stones     last one 2014   • Migraines    • SVT (supraventricular tachycardia) (CMS/[facility]) 2008    ablasion at age 13       Social History:  Social History     Socioeconomic History   • Marital status:      Spouse name: Not on file   • Number of children: Not on file   • Years of education: Not on file   • Highest education level: Not on file   Tobacco Use   • Smoking status: Never Smoker   • Smokeless tobacco: Never Used   Substance and Sexual Activity   • Alcohol use: No   • Drug use: No   • Sexual activity: Not Currently     Partners: Male       Family History:  Family History   Problem Relation Age of Onset   • Diabetes Mother    • Hypertension Mother    • Mental illness  Mother    • Migraines Mother    • Thyroid disease Mother    • Depression Mother    • Anxiety disorder Mother    • Diabetes Father    • Hyperlipidemia Father    • Mental illness Father    • Asthma Brother    • Heart attack Maternal Grandfather        Past Surgical History:  Past Surgical History:   Procedure Laterality Date   • CARDIAC ABLATION      Hx SVT   •  SECTION N/A 2018    Procedure:  SECTION PRIMARY;  Surgeon: Caitlin Martinez MD;  Location: [facility] LABOR DELIVERY;  Service: Obstetrics   • KIDNEY STONE SURGERY         Problem List:  Patient Active Problem List   Diagnosis   • Single liveborn, born in hospital, delivered by  section   • Generalized anxiety disorder with panic attacks   • Recurrent major depressive disorder (CMS/HCC)   • Migraine without aura and without status migrainosus, not intractable   • History of cardiac radiofrequency ablation       Allergy:   No Known Allergies     Current Medications:   Current Outpatient Medications   Medication Sig Dispense Refill   • hydrOXYzine (ATARAX) 25 MG tablet Take 1-2 tablets PO TID PRN for anxiety/sleep 180 tablet 0   • ibuprofen (ADVIL,MOTRIN) 600 MG tablet Take 1 tablet by mouth Every 6 (Six) Hours As Needed for Mild Pain . 60 tablet 1   • Prenatal Vit-Fe Fumarate-FA (PRENATAL, CLASSIC, VITAMIN) 28-0.8 MG tablet tablet Take  by mouth Daily.     • rizatriptan (Maxalt) 10 MG tablet Take 1 tablet by mouth at onset of migraine. May repeat in 2 hours if needed. Do not exceed 2 tablets in 24 hours. 12 tablet 2   • venlafaxine XR (Effexor XR) 75 MG 24 hr capsule Take 1 capsule by mouth Daily for 30 days. 30 capsule 0     No current facility-administered medications for this visit.        Review of Symptoms:    Review of Systems   Constitutional: Positive for activity change, appetite change, fatigue and unexpected weight loss.   Psychiatric/Behavioral: Positive for decreased concentration, sleep disturbance, depressed mood and  stress. The patient is nervous/anxious.          Physical Exam:   Due to the remote nature of this encounter (virtual encounter), vitals were unable to be obtained.  Height stated at 69 inches.  Weight stated at 230 pounds.        Physical Exam  Neurological:      Mental Status: She is alert.   Psychiatric:         Attention and Perception: Attention and perception normal.         Mood and Affect: Mood is anxious and depressed.         Speech: Speech normal.         Behavior: Behavior normal. Behavior is cooperative.         Thought Content: Thought content normal. Thought content is not paranoid or delusional. Thought content does not include homicidal or suicidal ideation. Thought content does not include homicidal or suicidal plan.         Cognition and Memory: Cognition and memory normal.         Judgment: Judgment normal.      Comments: Depressed and anxious affect           Mental Status Exam:   Hygiene:   good  Cooperation:  Cooperative  Eye Contact:  Good  Psychomotor Behavior:  Restless  Affect:  Depressed and anxious  Mood: depressed  Hopelessness: 5  Speech:  Normal  Thought Process:  Linear  Thought Content:  Normal  Suicidal:  None  Homicidal:  None  Hallucinations:  None  Delusion:  None  Memory:  Intact  Orientation:  Person, Place, Time and Situation  Reliability:  good  Insight:  Good  Judgement:  Good  Impulse Control:  Fair  Physical/Medical Issues:  No        PHQ-9 Depression Screening  Little interest or pleasure in doing things? 1   Feeling down, depressed, or hopeless? 1   Trouble falling or staying asleep, or sleeping too much? 2   Feeling tired or having little energy? 2   Poor appetite or overeating? 3   Feeling bad about yourself - or that you are a failure or have let yourself or your family down? 3   Trouble concentrating on things, such as reading the newspaper or watching television? 1   Moving or speaking so slowly that other people could have noticed? Or the opposite - being so  fidgety or restless that you have been moving around a lot more than usual? 0   Thoughts that you would be better off dead, or of hurting yourself in some way? 0   PHQ-9 Total Score 13   If you checked off any problems, how difficult have these problems made it for you to do your work, take care of things at home, or get along with other people? Somewhat difficult     PHQ-9 Total Score: 13        JUDITH 7 anxiety screening tool that patient filled out virtually reviewed by this APRN at today's encounter.    PROMIS scale screening tool that patient filled out virtually reviewed by this APRN at today's encounter.    Previous Provider notes and available records reviewed by this APRN at today's encounter.         Lab Results:   No visits with results within 1 Month(s) from this visit.   Latest known visit with results is:   Admission on 12/01/2018, Discharged on 12/05/2018   Component Date Value Ref Range Status   • External Strep Group B Ag 11/14/2018 Negative   Final   • External Chlamydia Screen 11/14/2018 Negative   Final   • External Gonorrhea Screen 11/14/2018 Negative   Final   • External Hepatitis C Ab 11/14/2018 negative   Final   • External Hepatitis B Surface Ag 11/14/2018 Negative   Final   • External HIV Antibody 11/14/2018 Negative   Final   • WBC 12/01/2018 13.02* 3.50 - 10.80 10*3/mm3 Final   • RBC 12/01/2018 3.98  3.89 - 5.14 10*6/mm3 Final   • Hemoglobin 12/01/2018 9.9* 11.5 - 15.5 g/dL Final   • Hematocrit 12/01/2018 31.8* 34.5 - 44.0 % Final   • MCV 12/01/2018 79.9* 80.0 - 99.0 fL Final   • MCH 12/01/2018 24.9* 27.0 - 31.0 pg Final   • MCHC 12/01/2018 31.1* 32.0 - 36.0 g/dL Final   • RDW 12/01/2018 14.6* 11.3 - 14.5 % Final   • RDW-SD 12/01/2018 42.4  37.0 - 54.0 fl Final   • MPV 12/01/2018 11.2  6.0 - 12.0 fL Final   • Platelets 12/01/2018 263  150 - 450 10*3/mm3 Final   • ABO Type 12/01/2018 O   Final   • RH type 12/01/2018 Positive   Final   • Antibody Screen 12/01/2018 Negative   Final   • T&S  Expiration Date 12/01/2018 12/4/2018 11:59:59 PM   Final   • Glucose 12/01/2018 83  70 - 100 mg/dL Final   • BUN 12/01/2018 12  9 - 23 mg/dL Final   • Creatinine 12/01/2018 0.61  0.60 - 1.30 mg/dL Final   • Sodium 12/01/2018 135  132 - 146 mmol/L Final   • Potassium 12/01/2018 3.5  3.5 - 5.5 mmol/L Final   • Chloride 12/01/2018 106  99 - 109 mmol/L Final   • CO2 12/01/2018 25.0  20.0 - 31.0 mmol/L Final   • Calcium 12/01/2018 8.9  8.7 - 10.4 mg/dL Final   • Total Protein 12/01/2018 5.9  5.7 - 8.2 g/dL Final   • Albumin 12/01/2018 3.74  3.20 - 4.80 g/dL Final   • ALT (SGPT) 12/01/2018 31  7 - 40 U/L Final   • AST (SGOT) 12/01/2018 26  0 - 33 U/L Final   • Alkaline Phosphatase 12/01/2018 176* 25 - 100 U/L Final   • Total Bilirubin 12/01/2018 0.4  0.3 - 1.2 mg/dL Final   • eGFR Non African Amer 12/01/2018 120  >60 mL/min/1.73 Final   • Globulin 12/01/2018 2.2  gm/dL Final   • A/G Ratio 12/01/2018 1.7  1.5 - 2.5 g/dL Final   • BUN/Creatinine Ratio 12/01/2018 19.7  7.0 - 25.0 Final   • Anion Gap 12/01/2018 4.0  3.0 - 11.0 mmol/L Final   • THC, Screen, Urine 12/01/2018 Negative  Negative Final   • Phencyclidine (PCP), Urine 12/01/2018 Negative  Negative Final   • Cocaine Screen, Urine 12/01/2018 Negative  Negative Final   • Methamphetamine, Ur 12/01/2018 Negative  Negative Final   • Opiate Screen 12/01/2018 Negative  Negative Final   • Amphetamine Screen, Urine 12/01/2018 Negative  Negative Final   • Benzodiazepine Screen, Urine 12/01/2018 Negative  Negative Final   • Tricyclic Antidepressants Screen 12/01/2018 Negative  Negative Final   • Methadone Screen, Urine 12/01/2018 Negative  Negative Final   • Barbiturates Screen, Urine 12/01/2018 Negative  Negative Final   • Oxycodone Screen, Urine 12/01/2018 Negative  Negative Final   • Propoxyphene Screen 12/01/2018 Negative  Negative Final   • Buprenorphine, Screen, Urine 12/01/2018 Negative  Negative Final   • External GTT 1 Hour 09/14/2018 135   Final   • External RPR  06/07/2018 Non-Reactive   Final   • External Rubella Qual 06/07/2018 Non-Immune   Final   • WBC 12/03/2018 16.48* 3.50 - 10.80 10*3/mm3 Final   • RBC 12/03/2018 3.04* 3.89 - 5.14 10*6/mm3 Final   • Hemoglobin 12/03/2018 7.6* 11.5 - 15.5 g/dL Final   • Hematocrit 12/03/2018 24.6* 34.5 - 44.0 % Final   • MCV 12/03/2018 80.9  80.0 - 99.0 fL Final   • MCH 12/03/2018 25.0* 27.0 - 31.0 pg Final   • MCHC 12/03/2018 30.9* 32.0 - 36.0 g/dL Final   • RDW 12/03/2018 15.2* 11.3 - 14.5 % Final   • RDW-SD 12/03/2018 44.8  37.0 - 54.0 fl Final   • MPV 12/03/2018 11.1  6.0 - 12.0 fL Final   • Platelets 12/03/2018 187  150 - 450 10*3/mm3 Final   • Neutrophil % 12/03/2018 81.3* 41.0 - 71.0 % Final   • Lymphocyte % 12/03/2018 11.8* 24.0 - 44.0 % Final   • Monocyte % 12/03/2018 6.6  0.0 - 12.0 % Final   • Eosinophil % 12/03/2018 0.2  0.0 - 3.0 % Final   • Basophil % 12/03/2018 0.1  0.0 - 1.0 % Final   • Immature Grans % 12/03/2018 0.4  0.0 - 0.6 % Final   • Neutrophils, Absolute 12/03/2018 13.38* 1.50 - 8.30 10*3/mm3 Final   • Lymphocytes, Absolute 12/03/2018 1.95  0.60 - 4.80 10*3/mm3 Final   • Monocytes, Absolute 12/03/2018 1.09* 0.00 - 1.00 10*3/mm3 Final   • Eosinophils, Absolute 12/03/2018 0.04  0.00 - 0.30 10*3/mm3 Final   • Basophils, Absolute 12/03/2018 0.02  0.00 - 0.20 10*3/mm3 Final   • Immature Grans, Absolute 12/03/2018 0.06* 0.00 - 0.03 10*3/mm3 Final   • Hemoglobin 12/04/2018 7.8* 11.5 - 15.5 g/dL Final   • Hematocrit 12/04/2018 25.9* 34.5 - 44.0 % Final         Assessment/Plan   Problems Addressed this Visit     None      Visit Diagnoses     Generalized anxiety disorder  (Chronic)   -  Primary    Relevant Medications    venlafaxine XR (Effexor XR) 75 MG 24 hr capsule    Major depressive disorder, recurrent episode, moderate (CMS/HCC)  (Chronic)       Relevant Medications    venlafaxine XR (Effexor XR) 75 MG 24 hr capsule    Sleep difficulties  (Chronic)         Diagnoses       Codes Comments    Generalized anxiety  disorder    -  Primary ICD-10-CM: F41.1  ICD-9-CM: 300.02     Major depressive disorder, recurrent episode, moderate (CMS/HCC)     ICD-10-CM: F33.1  ICD-9-CM: 296.32     Sleep difficulties     ICD-10-CM: G47.9  ICD-9-CM: 780.50           Visit Diagnoses:    ICD-10-CM ICD-9-CM   1. Generalized anxiety disorder  F41.1 300.02   2. Major depressive disorder, recurrent episode, moderate (CMS/HCC)  F33.1 296.32   3. Sleep difficulties  G47.9 780.50          GOALS:  Short Term Goals: Patient will be compliant with medication, and patient will have no significant medication related side effects.  Patient will be engaged in psychotherapy as indicated.  Patient will report subjective improvement of symptoms.  Long term goals: To stabilize mood and treat/improve subjective symptoms, the patient will stay out of the hospital, the patient will be at an optimal level of functioning, and the patient will take all medications as prescribed.  The patient verbalized understanding and agreement with goals that were mutually set.      TREATMENT PLAN: Continue supportive psychotherapy efforts and medications as indicated. Decrease Celexa to 10 mg PO Daily x7 days, then discontinue. Start Effexor XR 75 mg PO Daily for depression and anxiety. Continue hydroxyzine 25-50 mg PO TID PRN for anxiety/sleep. Medication and treatment options, both pharmacological and non-pharmacological treatment options, discussed during today's visit, including any off label use of medication. Patient acknowledged and verbally consented with current treatment plan and was educated on the importance of compliance with treatment and follow-up appointments.      This APRN discussed with the patient/guardian that using certain medications together has the potential to cause an irregular heart rhythm, as is the case with this patient's current medications. The patient/guardian verbalized understanding in their own words and still wants to start the current  medication regimen. The patient/guardian understands if they experience any heart racing, palpitations, chest pain, feeling of shortness of breath, dizziness or feeling light headed, or any cardiac symptoms they are to stop the medicine immediately and call 911/go to the ER immediately.        MEDICATION ISSUES:    Discussed treatment plan and medication options of prescribed medication as well as the risks, benefits, any black box warnings, and side effects including potential falls, possible impaired driving, and metabolic adversities among others, including any off label use of medication. Patient is agreeable to call the office with any worsening of symptoms or onset of side effects, or if any concerns or questions arise.  The contact information for the office is made available to the patient. Patient is agreeable to call 911 or go to the nearest ER should they begin having any SI/HI, or if any urgent concerns arise. No medication side effects or related complaints today.       MEDS ORDERED DURING VISIT:  New Medications Ordered This Visit   Medications   • venlafaxine XR (Effexor XR) 75 MG 24 hr capsule     Sig: Take 1 capsule by mouth Daily for 30 days.     Dispense:  30 capsule     Refill:  0       Return in about 4 weeks (around 4/1/2021), or if symptoms worsen or fail to improve, for Recheck.     Treatment Plan completed: 2/4/21    Progress toward goal: Not at goal    Functional Status: Moderate impairment     Prognosis: Good with Ongoing Treatment         This document has been electronically signed by WILEY Pang  March 4, 2021 13:33 EST    Please note that portions of this note were completed with a voice recognition program. Efforts were made to edit dictation, but occasionally words are mistranscribed.

## 2021-04-06 ENCOUNTER — TELEPHONE (OUTPATIENT)
Dept: INTERNAL MEDICINE | Facility: CLINIC | Age: 26
End: 2021-04-06

## 2021-04-06 DIAGNOSIS — G43.009 MIGRAINE WITHOUT AURA AND WITHOUT STATUS MIGRAINOSUS, NOT INTRACTABLE: Primary | ICD-10-CM

## 2021-04-06 RX ORDER — SUMATRIPTAN 100 MG/1
TABLET, FILM COATED ORAL
Qty: 9 TABLET | Refills: 5 | Status: SHIPPED | OUTPATIENT
Start: 2021-04-06 | End: 2021-10-07 | Stop reason: SDUPTHER

## 2021-04-06 NOTE — TELEPHONE ENCOUNTER
Please call pt and let her know I have sent in Imitrex which she has taken in the past. We can also discuss other options at her appt on 4/15 for more long-term relief of migraines.

## 2021-04-06 NOTE — TELEPHONE ENCOUNTER
LOV for chronic condition 01/21/2021  NOV 04/15/2021      Rx has never been prescribed to pt, please advise?

## 2021-04-06 NOTE — TELEPHONE ENCOUNTER
Caller: Soni Zarate    Relationship: Self    Best call back number: [phone]    What medication are you requesting: SUMATRIPTAN    What are your current symptoms: MIGRAINE    How long have you been experiencing symptoms: 2 DAYS    Have you had these symptoms before:    [x] Yes  [] No    Have you been treated for these symptoms before:   [x] Yes  [] No    If a prescription is needed, what is your preferred pharmacy and phone number: [facility] #[id number] [place] [id number] AT [facility] [phone] [facility] [phone] FX     Additional notes:  PATIENT STATED SHE IS CURRENTLY PRESCRIBED rizatriptan (Maxalt) 10 MG tablet BUT IT IS NOT HELPING. PATIENT STATED THIS IS THE SECOND TIME THIS MEDICATION HAS NOT HELPED RELIEVE HER MIGRAINES

## 2021-04-07 NOTE — TELEPHONE ENCOUNTER
Soni Zarate [phone]  [facility] requesting pt call back office number given, [phone].    Hub please advise:  Let her know I have sent in Imitrex which she has taken in the past. We can also discuss other options at her appt on 4/15 for more long-term relief of migraines.

## 2021-04-08 NOTE — TELEPHONE ENCOUNTER
Soni Zarate [phone]  Spoke to pt, advised of clinical message. Pt's in agreement with plan to start the Imitrex, she hasn't picked it up from the pharmacy just yet, but will do so today. Advised if she had any questions or concerns prior to her appointment, advised to give us a call back to confirm. Good verbal understanding.

## 2021-04-15 ENCOUNTER — OFFICE VISIT (OUTPATIENT)
Dept: INTERNAL MEDICINE | Facility: CLINIC | Age: 26
End: 2021-04-15

## 2021-04-15 VITALS
HEIGHT: 70 IN | HEART RATE: 84 BPM | BODY MASS INDEX: 31.21 KG/M2 | OXYGEN SATURATION: 98 % | WEIGHT: 218 LBS | RESPIRATION RATE: 15 BRPM | SYSTOLIC BLOOD PRESSURE: 110 MMHG | DIASTOLIC BLOOD PRESSURE: 70 MMHG | TEMPERATURE: 98 F

## 2021-04-15 DIAGNOSIS — L30.9 ECZEMA, UNSPECIFIED TYPE: ICD-10-CM

## 2021-04-15 DIAGNOSIS — G43.009 MIGRAINE WITHOUT AURA AND WITHOUT STATUS MIGRAINOSUS, NOT INTRACTABLE: Primary | ICD-10-CM

## 2021-04-15 DIAGNOSIS — F41.0 GENERALIZED ANXIETY DISORDER WITH PANIC ATTACKS: ICD-10-CM

## 2021-04-15 DIAGNOSIS — Z30.09 GENERAL COUNSELING AND ADVICE ON FEMALE CONTRACEPTION: ICD-10-CM

## 2021-04-15 DIAGNOSIS — F33.9 RECURRENT MAJOR DEPRESSIVE DISORDER, REMISSION STATUS UNSPECIFIED (HCC): ICD-10-CM

## 2021-04-15 DIAGNOSIS — F41.1 GENERALIZED ANXIETY DISORDER WITH PANIC ATTACKS: ICD-10-CM

## 2021-04-15 PROCEDURE — 99213 OFFICE O/P EST LOW 20 MIN: CPT | Performed by: PHYSICIAN ASSISTANT

## 2021-04-15 RX ORDER — MULTIPLE VITAMINS W/ MINERALS TAB 9MG-400MCG
1 TAB ORAL DAILY
COMMUNITY

## 2021-04-15 RX ORDER — TRIAMCINOLONE ACETONIDE 1 MG/G
CREAM TOPICAL 2 TIMES DAILY PRN
Qty: 30 G | Refills: 1 | Status: SHIPPED | OUTPATIENT
Start: 2021-04-15

## 2021-09-15 DIAGNOSIS — G47.9 SLEEP DIFFICULTIES: Chronic | ICD-10-CM

## 2021-09-15 DIAGNOSIS — F41.1 GENERALIZED ANXIETY DISORDER: Chronic | ICD-10-CM

## 2021-09-15 RX ORDER — HYDROXYZINE HYDROCHLORIDE 25 MG/1
TABLET, FILM COATED ORAL
Qty: 180 TABLET | Refills: 0 | OUTPATIENT
Start: 2021-09-15

## 2021-10-07 ENCOUNTER — TELEPHONE (OUTPATIENT)
Dept: INTERNAL MEDICINE | Facility: CLINIC | Age: 26
End: 2021-10-07

## 2021-10-07 DIAGNOSIS — G43.009 MIGRAINE WITHOUT AURA AND WITHOUT STATUS MIGRAINOSUS, NOT INTRACTABLE: ICD-10-CM

## 2021-10-07 RX ORDER — SUMATRIPTAN 100 MG/1
TABLET, FILM COATED ORAL
Qty: 9 TABLET | Refills: 0 | Status: SHIPPED | OUTPATIENT
Start: 2021-10-07 | End: 2021-10-27 | Stop reason: SDUPTHER

## 2021-10-07 NOTE — TELEPHONE ENCOUNTER
LOV for chronic condition 04/15/2021  NOV N/S     Return in about 4 months (around 8/15/2021) for Annual physical.  LMOM for patient to call    HUB please inform patient    Patient is due for a Follow-up.  Please advise patient she needs to schedule and keep her appointment to continue to receive refills in the future.  If she is unable to keep her appointment we will not be able to provider further refills.  Once appointment has been scheduled please update message with date and time so we can process the request.  We will forward the message to the provider to review the refill request.

## 2021-10-07 NOTE — TELEPHONE ENCOUNTER
Caller: Soni Zarate    Relationship: Self      Medication requested (name and dosage): SUMAtriptan (Imitrex) 100 MG tablet    Pharmacy where request should be sent: [facility] , [place], [place] [phone]    Additional details provided by patient: patient has been completely out for 14 days     Best call back number: [phone]    Does the patient have less than a 3 day supply:  [x] Yes  [] No    Maycol Marrero Rep   10/07/21 11:18 EDT

## 2021-10-27 ENCOUNTER — OFFICE VISIT (OUTPATIENT)
Dept: INTERNAL MEDICINE | Facility: CLINIC | Age: 26
End: 2021-10-27

## 2021-10-27 VITALS
WEIGHT: 224 LBS | HEIGHT: 70 IN | SYSTOLIC BLOOD PRESSURE: 108 MMHG | HEART RATE: 85 BPM | RESPIRATION RATE: 15 BRPM | OXYGEN SATURATION: 98 % | DIASTOLIC BLOOD PRESSURE: 60 MMHG | BODY MASS INDEX: 32.07 KG/M2 | TEMPERATURE: 97.7 F

## 2021-10-27 DIAGNOSIS — F41.1 GENERALIZED ANXIETY DISORDER WITH PANIC ATTACKS: Primary | ICD-10-CM

## 2021-10-27 DIAGNOSIS — G43.009 MIGRAINE WITHOUT AURA AND WITHOUT STATUS MIGRAINOSUS, NOT INTRACTABLE: ICD-10-CM

## 2021-10-27 DIAGNOSIS — F41.0 GENERALIZED ANXIETY DISORDER WITH PANIC ATTACKS: Primary | ICD-10-CM

## 2021-10-27 PROCEDURE — 99213 OFFICE O/P EST LOW 20 MIN: CPT | Performed by: PHYSICIAN ASSISTANT

## 2021-10-27 RX ORDER — SUMATRIPTAN 100 MG/1
TABLET, FILM COATED ORAL
Qty: 9 TABLET | Refills: 5 | Status: SHIPPED | OUTPATIENT
Start: 2021-10-27 | End: 2022-01-04 | Stop reason: SDUPTHER

## 2022-01-04 DIAGNOSIS — G43.009 MIGRAINE WITHOUT AURA AND WITHOUT STATUS MIGRAINOSUS, NOT INTRACTABLE: ICD-10-CM

## 2022-01-04 RX ORDER — SUMATRIPTAN 100 MG/1
TABLET, FILM COATED ORAL
Qty: 9 TABLET | Refills: 5 | Status: SHIPPED | OUTPATIENT
Start: 2022-01-04 | End: 2022-03-08 | Stop reason: SDUPTHER

## 2022-01-04 NOTE — TELEPHONE ENCOUNTER
.    Caller: Tessa Soniioana Nix    Relationship: Self    Best call back number: [phone]    Requested Prescriptions:   Requested Prescriptions     Pending Prescriptions Disp Refills   • SUMAtriptan (Imitrex) 100 MG tablet 9 tablet 5     Sig: Take one tablet at onset of headache. May repeat dose one time in 2 hours if headache not relieved.        Pharmacy where request should be sent: [facility] #[id number] [facility] [address] AT [facility] [address]  - [phone]  - [phone] FX     Additional details provided by patient: COMPLETELY OUT OF MEDICATION     Does the patient have less than a 3 day supply:  [x] Yes  [] No    Maycol Velazquez Rep   01/04/22 15:21 EST

## 2022-03-08 DIAGNOSIS — G43.009 MIGRAINE WITHOUT AURA AND WITHOUT STATUS MIGRAINOSUS, NOT INTRACTABLE: ICD-10-CM

## 2022-03-08 NOTE — TELEPHONE ENCOUNTER
Caller: Soni Zarate    Relationship: Self    Best call back number:   [phone] (H)          Requested Prescriptions:   Requested Prescriptions     Pending Prescriptions Disp Refills   • SUMAtriptan (Imitrex) 100 MG tablet 9 tablet 5     Sig: Take one tablet at onset of headache. May repeat dose one time in 2 hours if headache not relieved.        Pharmacy where request should be sent: [facility] #[id number] [facility] [address] AT [facility] [address]  - [phone]  - [phone] FX     Additional details provided by patient: PATIENT IS OUT OF MEDICATION AND THE PHARMACY TOLD HER SHE DOES NOT HAVE ANYMORE REFILLS     Does the patient have less than a 3 day supply:  [x] Yes  [] No    Maycol Borja Rep   03/08/22 14:12 EST

## 2022-03-09 RX ORDER — SUMATRIPTAN 100 MG/1
TABLET, FILM COATED ORAL
Qty: 9 TABLET | Refills: 5 | Status: SHIPPED | OUTPATIENT
Start: 2022-03-09 | End: 2022-03-30

## 2022-03-29 DIAGNOSIS — G43.009 MIGRAINE WITHOUT AURA AND WITHOUT STATUS MIGRAINOSUS, NOT INTRACTABLE: ICD-10-CM

## 2022-03-30 RX ORDER — SUMATRIPTAN 100 MG/1
TABLET, FILM COATED ORAL
Qty: 9 TABLET | Refills: 5 | Status: SHIPPED | OUTPATIENT
Start: 2022-03-30 | End: 2023-01-17 | Stop reason: SDUPTHER

## 2022-06-10 ENCOUNTER — OFFICE VISIT (OUTPATIENT)
Dept: INTERNAL MEDICINE | Facility: CLINIC | Age: 27
End: 2022-06-10

## 2022-06-10 VITALS
RESPIRATION RATE: 16 BRPM | BODY MASS INDEX: 34.01 KG/M2 | TEMPERATURE: 98.7 F | DIASTOLIC BLOOD PRESSURE: 82 MMHG | HEART RATE: 105 BPM | OXYGEN SATURATION: 98 % | SYSTOLIC BLOOD PRESSURE: 122 MMHG | WEIGHT: 237 LBS

## 2022-06-10 DIAGNOSIS — G43.009 MIGRAINE WITHOUT AURA AND WITHOUT STATUS MIGRAINOSUS, NOT INTRACTABLE: ICD-10-CM

## 2022-06-10 DIAGNOSIS — H92.02 LEFT EAR PAIN: Primary | ICD-10-CM

## 2022-06-10 DIAGNOSIS — G47.9 SLEEP DIFFICULTIES: Chronic | ICD-10-CM

## 2022-06-10 DIAGNOSIS — F41.1 GENERALIZED ANXIETY DISORDER: Chronic | ICD-10-CM

## 2022-06-10 PROCEDURE — 99214 OFFICE O/P EST MOD 30 MIN: CPT | Performed by: PHYSICIAN ASSISTANT

## 2022-06-10 RX ORDER — HYDROXYZINE HYDROCHLORIDE 25 MG/1
TABLET, FILM COATED ORAL
Qty: 180 TABLET | Refills: 0 | Status: SHIPPED | OUTPATIENT
Start: 2022-06-10 | End: 2023-01-17

## 2022-06-10 RX ORDER — TOPIRAMATE 50 MG/1
50 TABLET, FILM COATED ORAL NIGHTLY
Qty: 30 TABLET | Refills: 2 | Status: SHIPPED | OUTPATIENT
Start: 2022-06-10 | End: 2023-01-17

## 2022-06-10 NOTE — PROGRESS NOTES
"Chief Complaint   Patient presents with   • Headache     Possible ear infection       Subjective       History of Present Illness     Soni Zarate is a 26 y.o. female.     Migraines  The patient reports having migraines that occur more frequently. She reports having migraines 2 to 3 episodes a month that lasts 4 to 5 days at a time. Today, she does report head pain. She denies knowing any specific triggers for the migraines. Her symptoms occur randomly. Her head pain starts with ear aches and then progresses.     Let ear pain with nausea and vomiting  The patient reports severe left inner ear pain. She reports that her left ear pain is so severe and radiates across her face. She reports symptoms of left ear pain with nausea. She reports taking sumatriptan 2 times daily with relief. She is reports being prescribed sumatriptan 9 tablets and completing her medication prior to refill date. She denies ever taking daily medication for head pain. She has a agreed to taking a daily head pain medication. She reports having a red liquidly and soft discharge from her ear. She reports that the discharge is dark and almost red in color. She denies knowing if her symptoms are worse during allergy season. Today, she does report ear pain. Her stabbing pain is located in the inner eardrum, behind her eye to the back side of head. She reports that her ear cerumen impaction occurs throughout the ear and does not increase during allergy seasons. She reports symptoms of dizziness and lightheadedness when standing up or trying to sit down. She reports that the room spins especially when her symptoms are severe. The patient reports forgetting her sumatriptan medication while on vacation and found herself on the floor vomiting and crying. She rated her pain a 9 out of 10, during this time. She then purchased the medication at a near pharmacy, while on vacation. She reports feeling as if her head is \"exploding.\" She denies having " any blurry vision or vision changes with her head pain. The patient did advise the nurse when she returned from vacation. She has light sensitivity. She reports symptoms of nausea and vomiting if her symptoms of head with ear pain are severe. The patient denies having ear pain when she was a child that she can remember. Although, she does report having a few ear infections.    Anxiety or insomnia  The patient reports taking hydroxyzine with effectiveness and has requested a refill, today.    Medications  She denies being allergic to any medications.    The following portions of the patient's history were reviewed and updated as appropriate: allergies, current medications, past medical history, past social history and problem list.    No Known Allergies  Social History     Tobacco Use   • Smoking status: Never Smoker   • Smokeless tobacco: Never Used   Substance Use Topics   • Alcohol use: No         Current Outpatient Medications:   •  hydrOXYzine (ATARAX) 25 MG tablet, Take 1-2 tablets PO TID PRN for anxiety/sleep, Disp: 180 tablet, Rfl: 0  •  ibuprofen (ADVIL,MOTRIN) 600 MG tablet, Take 1 tablet by mouth Every 6 (Six) Hours As Needed for Mild Pain ., Disp: 60 tablet, Rfl: 1  •  multivitamin with minerals tablet tablet, Take 1 tablet by mouth Daily., Disp: , Rfl:   •  SUMAtriptan (IMITREX) 100 MG tablet, TAKE 1 TABLET BY MOUTH AT ONSET OF HEADACHE. MAY REPEAT DOSE 1 TIME IN 2 HOURS IF HEADACHE NOT RELIEVED, Disp: 9 tablet, Rfl: 5  •  triamcinolone (KENALOG) 0.1 % cream, Apply  topically to the appropriate area as directed 2 (Two) Times a Day As Needed for Rash., Disp: 30 g, Rfl: 1  •  topiramate (Topamax) 50 MG tablet, Take 1 tablet by mouth Every Night., Disp: 30 tablet, Rfl: 2    Review of Systems   HENT: Positive for ear pain.    Gastrointestinal: Positive for nausea and vomiting.   Neurological: Positive for dizziness, light-headedness and headache.   Psychiatric/Behavioral: The patient is nervous/anxious  (well-controlled).        Objective   Vitals:    06/10/22 1338   BP: 122/82   Pulse: 105   Resp: 16   Temp: 98.7 °F (37.1 °C)   SpO2: 98%     Body mass index is 34.01 kg/m².    Physical Exam  Constitutional:       Appearance: Normal appearance. She is well-developed.   HENT:      Head: Normocephalic and atraumatic.      Right Ear: Tympanic membrane, ear canal and external ear normal.      Left Ear: Tympanic membrane, ear canal and external ear normal. There is impacted cerumen.      Ears:      Comments: We did remove some of the cerumen of the left ear.      Nose: Nose normal.      Mouth/Throat:      Mouth: Mucous membranes are moist.      Pharynx: Oropharynx is clear.   Eyes:      Conjunctiva/sclera: Conjunctivae normal.   Neck:      Thyroid: No thyromegaly.   Cardiovascular:      Rate and Rhythm: Normal rate and regular rhythm.      Heart sounds: No murmur heard.  Pulmonary:      Effort: Pulmonary effort is normal.      Breath sounds: Normal breath sounds. No wheezing or rales.   Abdominal:      Palpations: Abdomen is soft.      Tenderness: There is no abdominal tenderness.   Musculoskeletal:      Cervical back: Neck supple.   Lymphadenopathy:      Cervical: No cervical adenopathy.   Skin:     Findings: No rash.   Psychiatric:         Behavior: Behavior normal.       Normal exam PE      Assessment & Plan   Diagnoses and all orders for this visit:    1. Left ear pain (Primary)  -     Ambulatory Referral to ENT (Otolaryngology)    2. Generalized anxiety disorder  -     hydrOXYzine (ATARAX) 25 MG tablet; Take 1-2 tablets PO TID PRN for anxiety/sleep  Dispense: 180 tablet; Refill: 0    3. Sleep difficulties  -     hydrOXYzine (ATARAX) 25 MG tablet; Take 1-2 tablets PO TID PRN for anxiety/sleep  Dispense: 180 tablet; Refill: 0    4. Migraine without aura and without status migrainosus, not intractable  -     topiramate (Topamax) 50 MG tablet; Take 1 tablet by mouth Every Night.  Dispense: 30 tablet; Refill: 2    1.)  Migraine  - We have sent in a prescription of Topamax 50 mg nightly. We advise that she takes the medication on the weekend the first time.     2.) Left ear pain  - Ambulatory referral to ENT (Otolaryngology).    3.) Anxiety and insomnia   - The patient will receive a refill of hydroxyzine 25 MG tablet.     She has a follow-up in 08/2022.           Return if symptoms worsen or fail to improve.    Transcribed from ambient dictation for Jaqui Ojeda PA-C by Megha JUARES.  06/10/22   16:42 EDT    Patient verbalized consent to the visit recording.  I have personally performed the services described in this document as transcribed by the above individual, and it is both accurate and complete.  Jaqui Ojeda PA-C  6/26/2022  22:29 EDT

## 2022-09-21 ENCOUNTER — HOSPITAL ENCOUNTER (EMERGENCY)
Facility: HOSPITAL | Age: 27
Discharge: HOME | End: 2022-09-21
Attending: EMERGENCY MEDICINE | Admitting: EMERGENCY MEDICINE

## 2022-09-21 VITALS
RESPIRATION RATE: 16 BRPM | HEIGHT: 69 IN | HEART RATE: 115 BPM | DIASTOLIC BLOOD PRESSURE: 94 MMHG | WEIGHT: 230 LBS | TEMPERATURE: 98.4 F | BODY MASS INDEX: 34.07 KG/M2 | OXYGEN SATURATION: 98 % | SYSTOLIC BLOOD PRESSURE: 139 MMHG

## 2022-09-21 DIAGNOSIS — J10.1 INFLUENZA A WITH RESPIRATORY MANIFESTATIONS: Primary | ICD-10-CM

## 2022-09-21 LAB
B PARAPERT DNA SPEC QL NAA+PROBE: NOT DETECTED
B PERT DNA SPEC QL NAA+PROBE: NOT DETECTED
C PNEUM DNA NPH QL NAA+NON-PROBE: NOT DETECTED
FLUAV H3 RNA NPH QL NAA+PROBE: DETECTED
FLUBV RNA ISLT QL NAA+PROBE: NOT DETECTED
HADV DNA SPEC NAA+PROBE: NOT DETECTED
HCOV 229E RNA SPEC QL NAA+PROBE: NOT DETECTED
HCOV HKU1 RNA SPEC QL NAA+PROBE: NOT DETECTED
HCOV NL63 RNA SPEC QL NAA+PROBE: NOT DETECTED
HCOV OC43 RNA SPEC QL NAA+PROBE: NOT DETECTED
HMPV RNA NPH QL NAA+NON-PROBE: NOT DETECTED
HPIV1 RNA ISLT QL NAA+PROBE: NOT DETECTED
HPIV2 RNA SPEC QL NAA+PROBE: NOT DETECTED
HPIV3 RNA NPH QL NAA+PROBE: NOT DETECTED
HPIV4 P GENE NPH QL NAA+PROBE: NOT DETECTED
M PNEUMO IGG SER IA-ACNC: NOT DETECTED
RHINOVIRUS RNA SPEC NAA+PROBE: NOT DETECTED
RSV RNA NPH QL NAA+NON-PROBE: NOT DETECTED
SARS-COV-2 RNA NPH QL NAA+NON-PROBE: NOT DETECTED

## 2022-09-21 PROCEDURE — 99283 EMERGENCY DEPT VISIT LOW MDM: CPT

## 2022-09-21 PROCEDURE — 0202U NFCT DS 22 TRGT SARS-COV-2: CPT | Performed by: EMERGENCY MEDICINE

## 2022-09-21 NOTE — DISCHARGE INSTRUCTIONS
Symptomatic care is recommended with Tylenol or Ibuprofen as needed for pain and fever. Fluid intake and rest are encouraged.  Take all medications as prescribed and instructed. Follow up with your primary care as directed or return to Emergency Department with worsening of symptoms.

## 2022-09-23 NOTE — ED PROVIDER NOTES
[place]    EMERGENCY DEPARTMENT ENCOUNTER      Pt Name: Soni Zarate  MRN: [id number]  YOB: 1995  Date of evaluation: 2022  Provider: PALMER Cote    CHIEF COMPLAINT       Chief Complaint   Patient presents with   • Cough   • Fever         HISTORY OF PRESENT ILLNESS  (Location/Symptom, Timing/Onset, Context/Setting, Quality, Duration, Modifying Factors, Severity.)   Soni Zarate is a 26 y.o. female who presents to the emergency department with complaints of generalized body aches, cough and fever. Patient reports that her symptoms started yesterday. She states that she did a home test for COVID-19 that was negative.        History provided by:  Patient  URI  Presenting symptoms: congestion, cough, fatigue and fever    Severity:  Moderate  Onset quality:  Gradual  Duration:  1 day  Timing:  Constant  Progression:  Unchanged  Chronicity:  New  Relieved by:  Nothing  Worsened by:  Nothing  Ineffective treatments:  None tried  Associated symptoms: arthralgias, headaches and myalgias       Nursing notes were reviewed.    REVIEW OF SYSTEMS    (2-9 systems for level 4, 10 or more for level 5)   Review of Systems   Constitutional: Positive for fatigue and fever.   HENT: Positive for congestion.    Respiratory: Positive for cough.    Musculoskeletal: Positive for arthralgias and myalgias.   Neurological: Positive for headaches.      All systems reviewed and negative except for those discussed in HPI.   PAST MEDICAL HISTORY     Past Medical History:   Diagnosis Date   • Abnormal Pap smear of cervix     HPV   • Anemia    • Anxiety    • Depression    • Kidney stones     last one    • Migraines    • SVT (supraventricular tachycardia) (HCC)     ablasion at age 13         SURGICAL HISTORY       Past Surgical History:   Procedure Laterality Date   • CARDIAC ABLATION      Hx SVT   •  SECTION N/A 2018    Procedure:  SECTION PRIMARY;  Surgeon: Caitlin Martinez  MD CALLY;  Location: [facility] LABOR DELIVERY;  Service: Obstetrics   • KIDNEY STONE SURGERY           CURRENT MEDICATIONS     No current facility-administered medications for this encounter.    Current Outpatient Medications:   •  hydrOXYzine (ATARAX) 25 MG tablet, Take 1-2 tablets PO TID PRN for anxiety/sleep, Disp: 180 tablet, Rfl: 0  •  ibuprofen (ADVIL,MOTRIN) 600 MG tablet, Take 1 tablet by mouth Every 6 (Six) Hours As Needed for Mild Pain ., Disp: 60 tablet, Rfl: 1  •  multivitamin with minerals tablet tablet, Take 1 tablet by mouth Daily., Disp: , Rfl:   •  SUMAtriptan (IMITREX) 100 MG tablet, TAKE 1 TABLET BY MOUTH AT ONSET OF HEADACHE. MAY REPEAT DOSE 1 TIME IN 2 HOURS IF HEADACHE NOT RELIEVED, Disp: 9 tablet, Rfl: 5  •  topiramate (Topamax) 50 MG tablet, Take 1 tablet by mouth Every Night., Disp: 30 tablet, Rfl: 2  •  triamcinolone (KENALOG) 0.1 % cream, Apply  topically to the appropriate area as directed 2 (Two) Times a Day As Needed for Rash., Disp: 30 g, Rfl: 1    ALLERGIES     Patient has no known allergies.    FAMILY HISTORY       Family History   Problem Relation Age of Onset   • Diabetes Mother    • Hypertension Mother    • Mental illness Mother    • Migraines Mother    • Thyroid disease Mother    • Depression Mother    • Anxiety disorder Mother    • Diabetes Father    • Hyperlipidemia Father    • Mental illness Father    • Asthma Brother    • Heart attack Maternal Grandfather           SOCIAL HISTORY       Social History     Socioeconomic History   • Marital status:    Tobacco Use   • Smoking status: Never Smoker   • Smokeless tobacco: Never Used   Substance and Sexual Activity   • Alcohol use: No   • Drug use: No   • Sexual activity: Not Currently     Partners: Male         PHYSICAL EXAM    (up to 7 for level 4, 8 or more for level 5)   Physical Exam  Vitals and nursing note reviewed.   Constitutional:       General: She is not in acute distress.     Appearance: Normal appearance. She is  well-developed. She is ill-appearing. She is not toxic-appearing.   HENT:      Head: Normocephalic and atraumatic.      Nose: Congestion present.      Mouth/Throat:      Mouth: Mucous membranes are moist.   Eyes:      Extraocular Movements: Extraocular movements intact.   Cardiovascular:      Rate and Rhythm: Regular rhythm. Tachycardia present.   Pulmonary:      Effort: Pulmonary effort is normal. No respiratory distress.   Abdominal:      General: There is no distension.   Musculoskeletal:         General: Normal range of motion.      Cervical back: Normal range of motion.   Skin:     General: Skin is warm and dry.   Neurological:      General: No focal deficit present.      Mental Status: She is alert.   Psychiatric:         Behavior: Behavior normal.         Thought Content: Thought content normal.         Judgment: Judgment normal.          DIAGNOSTIC RESULTS     EKG: All EKGs are interpreted by the Emergency Department Physician who either signs or Co-signs this chart in the absence of a cardiologist.    No orders to display       RADIOLOGY:   Non-plain film images such as CT, Ultrasound and MRI are read by the radiologist. Plain radiographic images are visualized and preliminarily interpreted by the emergency physician with the below findings:      [] Radiologist's Report Reviewed:  No orders to display         ED BEDSIDE ULTRASOUND:   Performed by ED Physician - none    LABS:    I have reviewed and interpreted all of the currently available lab results from this visit (if applicable):  Results for orders placed or performed during the hospital encounter of 09/21/22   Respiratory Panel PCR w/COVID-19(SARS-CoV-2) LEVY/ANA/NATO/PAD/COR/MAD/LUIS ANTONIO In-House, NP Swab in UTM/VTM, 3-4 HR TAT - Swab, Nasopharynx    Specimen: Nasopharynx; Swab   Result Value Ref Range    ADENOVIRUS, PCR Not Detected Not Detected    Coronavirus 229E Not Detected Not Detected    Coronavirus HKU1 Not Detected Not Detected    Coronavirus NL63  "Not Detected Not Detected    Coronavirus OC43 Not Detected Not Detected    COVID19 Not Detected Not Detected - Ref. Range    Human Metapneumovirus Not Detected Not Detected    Human Rhinovirus/Enterovirus Not Detected Not Detected    Influenza A H3 Detected (A) Not Detected    Influenza B PCR Not Detected Not Detected    Parainfluenza Virus 1 Not Detected Not Detected    Parainfluenza Virus 2 Not Detected Not Detected    Parainfluenza Virus 3 Not Detected Not Detected    Parainfluenza Virus 4 Not Detected Not Detected    RSV, PCR Not Detected Not Detected    Bordetella pertussis pcr Not Detected Not Detected    Bordetella parapertussis PCR Not Detected Not Detected    Chlamydophila pneumoniae PCR Not Detected Not Detected    Mycoplasma pneumo by PCR Not Detected Not Detected        All other labs were within normal range or not returned as of this dictation.      EMERGENCY DEPARTMENT COURSE and DIFFERENTIAL DIAGNOSIS/MDM:   Vitals:    Vitals:    09/21/22 0035   BP: 139/94   BP Location: Right arm   Patient Position: Sitting   Pulse: 115   Resp: 16   Temp: 98.4 °F (36.9 °C)   TempSrc: Oral   SpO2: 98%   Weight: 104 kg (230 lb)   Height: 175.3 cm (69\")       ED Course as of 09/23/22 0921   Wed Sep 21, 2022   0227 Influenza A H3(!): Detected [J]   0227 Patient presents with symptoms suspicious for likely viral upper respiratory infection. Differential includes bacterial pneumonia, sinusitis, allergic rhinitis, influenza and COVID-19. Do not suspect underlying cardiopulmonary process. I considered, but think unlikely, dangerous causes of this patient's symptoms to include ACS, CHF or COPD exacerbations, pneumonia, pneumothorax. Nasopharyngeal swab POSITIVE FOR INFLUENZA A. Patient is afebrile, vital signs stable, nontoxic appearing with oxygen saturation of 98% on room air. Patient not in need of emergent medical intervention. Will be discharged home with symptomatic care. Patient provided reassurance. Discharged with " PCP follow up.   [JG]      ED Course User Index  [JG] Prem Gonzales, PA       [facility]     I had a discussion with the patient/family regarding diagnosis, diagnostic results, treatment plan, and medications.  The patient/family indicated understanding of these instructions.  I spent adequate time at the bedside preceding discharge necessary to personally discuss the aftercare instructions, giving patient education, providing explanations of the results of our evaluations/findings, and my decision making to assure that the patient/family understand the plan of care.  Time was allotted to answer questions at that time and throughout the ED course.  Emphasis was placed on timely follow-up after discharge.  I also discussed the potential for the development of an acute emergent condition requiring further evaluation, admission, or even surgical intervention. I discussed that we found nothing during the visit today indicating the need for further workup, admission, or the presence of an unstable medical condition.  I encouraged the patient to return to the emergency department immediately for ANY concerns, worsening, new complaints, or if symptoms persist and unable to seek follow-up in a timely fashion.  The patient/family expressed understanding and agreement with this plan.  The patient will follow-up with primary care for reevaluation.       MEDICATIONS ADMINISTERED IN ED:  Medications - No data to display    PROCEDURES:  Procedures          CRITICAL CARE TIME    Total Critical Care time was 0 minutes, excluding separately reportable procedures.   There was a high probability of clinically significant/life threatening deterioration in the patient's condition which required my urgent intervention.      FINAL IMPRESSION      1. Influenza A with respiratory manifestations          DISPOSITION/PLAN     ED Disposition     ED Disposition   Discharge    Condition   Stable    Comment   --             PATIENT REFERRED  TO:  Jaqui Ojeda PA-C  [address]  [facility] [address]  [phone]    Call   As needed    [facility] Emergency Department  [address] [address] [phone]  Go to   If symptoms worsen      DISCHARGE MEDICATIONS:     Medication List      CONTINUE taking these medications    hydrOXYzine 25 MG tablet  Commonly known as: ATARAX  Take 1-2 tablets PO TID PRN for anxiety/sleep     ibuprofen 600 MG tablet  Commonly known as: ADVIL,MOTRIN  Take 1 tablet by mouth Every 6 (Six) Hours As Needed for Mild Pain .     multivitamin with minerals tablet tablet     SUMAtriptan 100 MG tablet  Commonly known as: IMITREX  TAKE 1 TABLET BY MOUTH AT ONSET OF HEADACHE. MAY REPEAT DOSE 1 TIME IN 2 HOURS IF HEADACHE NOT RELIEVED     topiramate 50 MG tablet  Commonly known as: Topamax  Take 1 tablet by mouth Every Night.     triamcinolone 0.1 % cream  Commonly known as: KENALOG  Apply  topically to the appropriate area as directed 2 (Two) Times a Day As Needed for Rash.                Comment: Please note this report has been produced using speech recognition software.      PALMER Cote Jason C, PA  09/23/22 2917

## 2023-01-17 ENCOUNTER — TELEMEDICINE (OUTPATIENT)
Dept: INTERNAL MEDICINE | Facility: CLINIC | Age: 28
End: 2023-01-17
Payer: COMMERCIAL

## 2023-01-17 DIAGNOSIS — G43.009 MIGRAINE WITHOUT AURA AND WITHOUT STATUS MIGRAINOSUS, NOT INTRACTABLE: ICD-10-CM

## 2023-01-17 PROBLEM — M51.26 LUMBAR DISC HERNIATION: Status: ACTIVE | Noted: 2023-01-17

## 2023-01-17 PROCEDURE — 99213 OFFICE O/P EST LOW 20 MIN: CPT | Performed by: PHYSICIAN ASSISTANT

## 2023-01-17 RX ORDER — AZELASTINE 1 MG/ML
SPRAY, METERED NASAL EVERY 12 HOURS SCHEDULED
COMMUNITY
End: 2023-03-01

## 2023-01-17 RX ORDER — SUMATRIPTAN 100 MG/1
TABLET, FILM COATED ORAL
Qty: 9 TABLET | Refills: 5 | Status: SHIPPED | OUTPATIENT
Start: 2023-01-17

## 2023-01-17 NOTE — PROGRESS NOTES
Office Note     Name: Soni Zarate    : 1995     MRN: [id number]     Chief Complaint  No chief complaint on file.    Subjective     History of Present Illness:  Soni Zarate is a 27 y.o. female who presents today to establish care.    Patient reports she is needing to establish care as well as some refills.    She also reports that she was diagnosed with COVID 2 days ago.  She reports she is using DayQuil and NyQuil and doing well and does not need any medications at this time but will contact her office if she needs any further help.  She reports she is no longer taking the Vistaril as she is on ashwagandha Gummies actually help her more.    She denies any fever, chest pain, shortness of breath, headaches at this time.    Patient reports has had migraines for a long time and the only medicine that helps her is Imitrex she has taken in the past tolerated well and needs refill.    Physical Exam   Constitutional: She appears well-developed.   Eyes: EOM are normal.   Pulmonary/Chest: Effort normal.   Psychiatric: She has a normal mood and affect.     Review of Systems:   Review of Systems    Past Medical History:   Past Medical History:   Diagnosis Date   • Abnormal Pap smear of cervix     HPV   • Anemia    • Anxiety    • Depression    • Kidney stones     last one    • Migraines    • SVT (supraventricular tachycardia) (HCC) 2008    ablasion at age 13       Past Surgical History:   Past Surgical History:   Procedure Laterality Date   • CARDIAC ABLATION      Hx SVT   •  SECTION N/A 2018    Procedure:  SECTION PRIMARY;  Surgeon: Caitlin Martinez MD;  Location: [facility] LABOR DELIVERY;  Service: Obstetrics   • KIDNEY STONE SURGERY         Immunizations:   Immunization History   Administered Date(s) Administered   • COVID-19 (PFIZER) PURPLE CAP 2021   • Influenza, Unspecified 10/21/2015, 10/26/2018   • MMR 2018        Medications:     Current Outpatient  "Medications:   •  SUMAtriptan (IMITREX) 100 MG tablet, TAKE 1 TABLET BY MOUTH AT ONSET OF HEADACHE. MAY REPEAT DOSE 1 TIME IN 2 HOURS IF HEADACHE NOT RELIEVED, Disp: 9 tablet, Rfl: 5  •  azelastine (ASTELIN) 0.1 % nasal spray, Every 12 (Twelve) Hours., Disp: , Rfl:   •  ibuprofen (ADVIL,MOTRIN) 600 MG tablet, Take 1 tablet by mouth Every 6 (Six) Hours As Needed for Mild Pain ., Disp: 60 tablet, Rfl: 1  •  multivitamin with minerals tablet tablet, Take 1 tablet by mouth Daily., Disp: , Rfl:   •  triamcinolone (KENALOG) 0.1 % cream, Apply  topically to the appropriate area as directed 2 (Two) Times a Day As Needed for Rash., Disp: 30 g, Rfl: 1    Allergies:   No Known Allergies    Family History:   Family History   Problem Relation Age of Onset   • Diabetes Mother    • Hypertension Mother    • Mental illness Mother    • Migraines Mother    • Thyroid disease Mother    • Depression Mother    • Anxiety disorder Mother    • Diabetes Father    • Hyperlipidemia Father    • Mental illness Father    • Asthma Brother    • Heart attack Maternal Grandfather        Social History:   Social History     Socioeconomic History   • Marital status:    Tobacco Use   • Smoking status: Never   • Smokeless tobacco: Never   Substance and Sexual Activity   • Alcohol use: No   • Drug use: No   • Sexual activity: Not Currently     Partners: Male         Objective     Vital Signs  There were no vitals taken for this visit.  Estimated body mass index is 33.97 kg/m² as calculated from the following:    Height as of 9/21/22: 175.3 cm (69\").    Weight as of 9/21/22: 104 kg (230 lb).          Physical Exam  Constitutional:       Appearance: She is well-developed.   Eyes:      Extraocular Movements: EOM normal.   Pulmonary:      Effort: Pulmonary effort is normal.   Musculoskeletal:      Cervical back: Normal range of motion.   Psychiatric:         Mood and Affect: Mood and affect normal.          Assessment and Plan     1. Migraine without " aura and without status migrainosus, not intractable    - SUMAtriptan (IMITREX) 100 MG tablet; TAKE 1 TABLET BY MOUTH AT ONSET OF HEADACHE. MAY REPEAT DOSE 1 TIME IN 2 HOURS IF HEADACHE NOT RELIEVED  Dispense: 9 tablet; Refill: 5       Follow Up  Return in about 4 weeks (around 2/14/2023) for Annual.    CHRISSY Daly [facility]  [address] [address] [phone]

## 2023-03-01 ENCOUNTER — OFFICE VISIT (OUTPATIENT)
Dept: INTERNAL MEDICINE | Facility: CLINIC | Age: 28
End: 2023-03-01
Payer: COMMERCIAL

## 2023-03-01 VITALS
OXYGEN SATURATION: 98 % | HEIGHT: 69 IN | TEMPERATURE: 98.2 F | RESPIRATION RATE: 16 BRPM | WEIGHT: 235.2 LBS | SYSTOLIC BLOOD PRESSURE: 118 MMHG | HEART RATE: 88 BPM | BODY MASS INDEX: 34.83 KG/M2 | DIASTOLIC BLOOD PRESSURE: 82 MMHG

## 2023-03-01 DIAGNOSIS — N63.0: Primary | ICD-10-CM

## 2023-03-01 PROBLEM — E66.811 OBESITY (BMI 30.0-34.9): Status: ACTIVE | Noted: 2023-03-01

## 2023-03-01 PROBLEM — E66.9 OBESITY (BMI 30.0-34.9): Status: ACTIVE | Noted: 2023-03-01

## 2023-03-01 PROCEDURE — 99212 OFFICE O/P EST SF 10 MIN: CPT | Performed by: PHYSICIAN ASSISTANT

## 2023-03-01 NOTE — PROGRESS NOTES
Office Note     Name: Soni Zarate    : 1995     MRN: [id number]     Chief Complaint  Breast Mass (On left breast)    Subjective     History of Present Illness:  Soni Zarate is a 27 y.o. female who presents today for lump of left breast.  Patient reports maternal great grandmother and maternal aunt had breast cancer.    Patient reports she noticed a lump in the shower a few days ago.    Patient reports only a pea-sized lump, no skin changes, no nipple changes, no nipple discharge.    Review of Systems:   Review of Systems   All other systems reviewed and are negative.      Past Medical History:   Past Medical History:   Diagnosis Date   • Abnormal Pap smear of cervix     HPV   • Anemia    • Anxiety    • Depression    • Kidney stones     last one    • Migraines    • SVT (supraventricular tachycardia) (HCC) 2008    ablasion at age 13       Past Surgical History:   Past Surgical History:   Procedure Laterality Date   • CARDIAC ABLATION      Hx SVT   •  SECTION N/A 2018    Procedure:  SECTION PRIMARY;  Surgeon: Caitlin Martinez MD;  Location: [facility] LABOR DELIVERY;  Service: Obstetrics   • KIDNEY STONE SURGERY         Immunizations:   Immunization History   Administered Date(s) Administered   • COVID-19 (PFIZER) PURPLE CAP 2021   • Influenza, Unspecified 10/21/2015, 10/26/2018   • MMR 2018        Medications:     Current Outpatient Medications:   •  multivitamin with minerals tablet tablet, Take 1 tablet by mouth Daily., Disp: , Rfl:   •  SUMAtriptan (IMITREX) 100 MG tablet, TAKE 1 TABLET BY MOUTH AT ONSET OF HEADACHE. MAY REPEAT DOSE 1 TIME IN 2 HOURS IF HEADACHE NOT RELIEVED, Disp: 9 tablet, Rfl: 5  •  triamcinolone (KENALOG) 0.1 % cream, Apply  topically to the appropriate area as directed 2 (Two) Times a Day As Needed for Rash., Disp: 30 g, Rfl: 1    Allergies:   No Known Allergies    Family History:   Family History   Problem Relation Age of  "Onset   • Diabetes Mother    • Hypertension Mother    • Mental illness Mother    • Migraines Mother    • Thyroid disease Mother    • Depression Mother    • Anxiety disorder Mother    • Diabetes Father    • Hyperlipidemia Father    • Mental illness Father    • Asthma Brother    • Heart attack Maternal Grandfather        Social History:   Social History     Socioeconomic History   • Marital status:    Tobacco Use   • Smoking status: Never   • Smokeless tobacco: Never   Substance and Sexual Activity   • Alcohol use: No   • Drug use: No   • Sexual activity: Not Currently     Partners: Male         Objective     Vital Signs  /82 (BP Location: Right arm, Patient Position: Sitting, Cuff Size: Adult)   Pulse 88   Temp 98.2 °F (36.8 °C) (Infrared)   Resp 16   Ht 175.3 cm (69\")   Wt 107 kg (235 lb 3.2 oz)   SpO2 98%   BMI 34.73 kg/m²   Estimated body mass index is 34.73 kg/m² as calculated from the following:    Height as of this encounter: 175.3 cm (69\").    Weight as of this encounter: 107 kg (235 lb 3.2 oz).    BMI is >= 30 and <35. (Class 1 Obesity). The following options were offered after discussion;: weight loss educational material (shared in after visit summary)      Physical Exam  Vitals and nursing note reviewed. Exam conducted with a chaperone present.   Constitutional:       Appearance: Normal appearance.   Cardiovascular:      Rate and Rhythm: Normal rate and regular rhythm.      Pulses: Normal pulses.      Heart sounds: Normal heart sounds.   Pulmonary:      Effort: Pulmonary effort is normal.      Breath sounds: Normal breath sounds.   Chest:          Comments: Small, hard, seemingly superficial pea-sized hard mass felt at approximately the 8 o'clock position near sternum.  Skin:     General: Skin is warm.   Neurological:      General: No focal deficit present.      Mental Status: She is alert.   Psychiatric:         Mood and Affect: Mood normal.         Behavior: Behavior normal.         " Thought Content: Thought content normal.         Judgment: Judgment normal.          Assessment and Plan     1. Lump in central portion of breast  Patient given number for central scheduling to schedule if not scheduled in appropriate amount of time patient is to call back so that we can arrange sooner scheduling.  - US breast left complete; Future         Follow Up  Return in about 1 month (around 4/1/2023) for Annual.    CHRISSY Daly [facility] INTERNAL MEDICINE & PEDIATRICS  [address] [address] [phone]

## 2023-03-06 ENCOUNTER — OFFICE VISIT (OUTPATIENT)
Dept: INTERNAL MEDICINE | Facility: CLINIC | Age: 28
End: 2023-03-06
Payer: COMMERCIAL

## 2023-03-06 VITALS
HEART RATE: 89 BPM | HEIGHT: 69 IN | DIASTOLIC BLOOD PRESSURE: 84 MMHG | SYSTOLIC BLOOD PRESSURE: 122 MMHG | WEIGHT: 235.2 LBS | BODY MASS INDEX: 34.83 KG/M2 | OXYGEN SATURATION: 100 % | TEMPERATURE: 97.8 F

## 2023-03-06 DIAGNOSIS — N91.2 AMENORRHEA: Primary | ICD-10-CM

## 2023-03-06 LAB — HCG INTACT+B SERPL-ACNC: <1 MIU/ML

## 2023-03-06 PROCEDURE — 99212 OFFICE O/P EST SF 10 MIN: CPT | Performed by: PHYSICIAN ASSISTANT

## 2023-03-06 PROCEDURE — 84702 CHORIONIC GONADOTROPIN TEST: CPT | Performed by: PHYSICIAN ASSISTANT

## 2023-03-06 NOTE — PROGRESS NOTES
Office Note     Name: Soni Zarate    : 1995     MRN: [id number]     Chief Complaint  Possible Pregnancy (Would like blood labs for possible pregnancy)    Subjective     History of Present Illness:  Soni Zarate is a 27 y.o. female who presents today for her menstrual cycle being 1 week late.    Patient reports her and her  has been actively trying to get pregnant for 1 year.  She reports she has taken multiple home test and they are negative.      Past Medical History:   Past Medical History:   Diagnosis Date   • Abnormal Pap smear of cervix     HPV   • Anemia    • Anxiety    • Depression    • Kidney stones     last one    • Migraines    • SVT (supraventricular tachycardia) (HCC)     ablasion at age 13       Past Surgical History:   Past Surgical History:   Procedure Laterality Date   • CARDIAC ABLATION      Hx SVT   •  SECTION N/A 2018    Procedure:  SECTION PRIMARY;  Surgeon: Caitlin Martinez MD;  Location: [facility] LABOR DELIVERY;  Service: Obstetrics   • KIDNEY STONE SURGERY         Immunizations:   Immunization History   Administered Date(s) Administered   • COVID-19 (PFIZER) PURPLE CAP 2021   • Influenza, Unspecified 10/21/2015, 10/26/2018   • MMR 2018        Medications:     Current Outpatient Medications:   •  multivitamin with minerals tablet tablet, Take 1 tablet by mouth Daily., Disp: , Rfl:   •  SUMAtriptan (IMITREX) 100 MG tablet, TAKE 1 TABLET BY MOUTH AT ONSET OF HEADACHE. MAY REPEAT DOSE 1 TIME IN 2 HOURS IF HEADACHE NOT RELIEVED, Disp: 9 tablet, Rfl: 5  •  triamcinolone (KENALOG) 0.1 % cream, Apply  topically to the appropriate area as directed 2 (Two) Times a Day As Needed for Rash., Disp: 30 g, Rfl: 1    Allergies:   No Known Allergies    Family History:   Family History   Problem Relation Age of Onset   • Diabetes Mother    • Hypertension Mother    • Mental illness Mother    • Migraines Mother    • Thyroid disease  "Mother    • Depression Mother    • Anxiety disorder Mother    • Diabetes Father    • Hyperlipidemia Father    • Mental illness Father    • Asthma Brother    • Heart attack Maternal Grandfather        Social History:   Social History     Socioeconomic History   • Marital status:    Tobacco Use   • Smoking status: Never   • Smokeless tobacco: Never   Vaping Use   • Vaping Use: Never used   Substance and Sexual Activity   • Alcohol use: No   • Drug use: No   • Sexual activity: Yes     Partners: Male         Objective     Vital Signs  /84   Pulse 89   Temp 97.8 °F (36.6 °C) (Infrared)   Ht 175.3 cm (69.02\")   Wt 107 kg (235 lb 3.2 oz)   SpO2 100%   BMI 34.72 kg/m²   Estimated body mass index is 34.72 kg/m² as calculated from the following:    Height as of this encounter: 175.3 cm (69.02\").    Weight as of this encounter: 107 kg (235 lb 3.2 oz).    BMI is >= 30 and <35. (Class 1 Obesity). The following options were offered after discussion;: weight loss educational material (shared in after visit summary)      Physical Exam  Vitals and nursing note reviewed.   Constitutional:       Appearance: Normal appearance.   Cardiovascular:      Rate and Rhythm: Normal rate and regular rhythm.      Pulses: Normal pulses.      Heart sounds: Normal heart sounds.   Pulmonary:      Effort: Pulmonary effort is normal.      Breath sounds: Normal breath sounds.   Musculoskeletal:         General: Normal range of motion.   Skin:     General: Skin is warm.   Neurological:      General: No focal deficit present.      Mental Status: She is alert.   Psychiatric:         Mood and Affect: Mood normal.         Behavior: Behavior normal.         Thought Content: Thought content normal.         Judgment: Judgment normal.          Assessment and Plan     1. Amenorrhea    - hCG, Quantitative, Pregnancy; Future     Patient has an appointment in April with OB/GYN for infertility    Follow Up  No follow-ups on file.    Lorelei Vann, " CHRISSY GUY [facility] INTERNAL MEDICINE & PEDIATRICS  [address] [facility] [address]  [facility] [address] [phone]

## 2023-03-15 ENCOUNTER — HOSPITAL ENCOUNTER (OUTPATIENT)
Dept: MAMMOGRAPHY | Facility: HOSPITAL | Age: 28
Discharge: HOME | End: 2023-03-15
Payer: COMMERCIAL

## 2023-03-15 ENCOUNTER — HOSPITAL ENCOUNTER (OUTPATIENT)
Dept: ULTRASOUND IMAGING | Facility: HOSPITAL | Age: 28
Discharge: HOME | End: 2023-03-15
Payer: COMMERCIAL

## 2023-03-15 DIAGNOSIS — N63.0: ICD-10-CM

## 2023-03-15 PROCEDURE — G0279 TOMOSYNTHESIS, MAMMO: HCPCS

## 2023-03-15 PROCEDURE — 77061 BREAST TOMOSYNTHESIS UNI: CPT | Performed by: RADIOLOGY

## 2023-03-15 PROCEDURE — 76641 ULTRASOUND BREAST COMPLETE: CPT | Performed by: RADIOLOGY

## 2023-03-15 PROCEDURE — 76641 ULTRASOUND BREAST COMPLETE: CPT

## 2023-03-15 PROCEDURE — 77065 DX MAMMO INCL CAD UNI: CPT | Performed by: RADIOLOGY

## 2023-03-15 PROCEDURE — 77065 DX MAMMO INCL CAD UNI: CPT

## 2023-03-17 ENCOUNTER — APPOINTMENT (OUTPATIENT)
Dept: ULTRASOUND IMAGING | Facility: HOSPITAL | Age: 28
End: 2023-03-17
Payer: COMMERCIAL

## 2023-03-17 LAB
ABO GROUP BLD: NORMAL
B-HCG UR QL: NEGATIVE
BACTERIA UR QL AUTO: ABNORMAL /HPF
BASOPHILS # BLD AUTO: 0.03 10*3/MM3 (ref 0–0.2)
BASOPHILS NFR BLD AUTO: 0.2 % (ref 0–1.5)
BILIRUB UR QL STRIP: NEGATIVE
BLD GP AB SCN SERPL QL: NEGATIVE
CLARITY UR: CLEAR
COLOR UR: YELLOW
DEPRECATED RDW RBC AUTO: 46.6 FL (ref 37–54)
EOSINOPHIL # BLD AUTO: 0.15 10*3/MM3 (ref 0–0.4)
EOSINOPHIL NFR BLD AUTO: 1.2 % (ref 0.3–6.2)
ERYTHROCYTE [DISTWIDTH] IN BLOOD BY AUTOMATED COUNT: 16.1 % (ref 12.3–15.4)
EXPIRATION DATE: NORMAL
GLUCOSE UR STRIP-MCNC: NEGATIVE MG/DL
HCG INTACT+B SERPL-ACNC: <0.1 MIU/ML
HCT VFR BLD AUTO: 37.1 % (ref 34–46.6)
HGB BLD-MCNC: 11.2 G/DL (ref 12–15.9)
HGB UR QL STRIP.AUTO: ABNORMAL
HOLD SPECIMEN: NORMAL
HOLD SPECIMEN: NORMAL
HYALINE CASTS UR QL AUTO: ABNORMAL /LPF
IMM GRANULOCYTES # BLD AUTO: 0.05 10*3/MM3 (ref 0–0.05)
IMM GRANULOCYTES NFR BLD AUTO: 0.4 % (ref 0–0.5)
INTERNAL NEGATIVE CONTROL: NEGATIVE
INTERNAL POSITIVE CONTROL: POSITIVE
KETONES UR QL STRIP: NEGATIVE
LEUKOCYTE ESTERASE UR QL STRIP.AUTO: ABNORMAL
LYMPHOCYTES # BLD AUTO: 2.1 10*3/MM3 (ref 0.7–3.1)
LYMPHOCYTES NFR BLD AUTO: 16.6 % (ref 19.6–45.3)
Lab: NORMAL
MCH RBC QN AUTO: 24.2 PG (ref 26.6–33)
MCHC RBC AUTO-ENTMCNC: 30.2 G/DL (ref 31.5–35.7)
MCV RBC AUTO: 80.1 FL (ref 79–97)
MONOCYTES # BLD AUTO: 0.56 10*3/MM3 (ref 0.1–0.9)
MONOCYTES NFR BLD AUTO: 4.4 % (ref 5–12)
NEUTROPHILS NFR BLD AUTO: 77.2 % (ref 42.7–76)
NEUTROPHILS NFR BLD AUTO: 9.79 10*3/MM3 (ref 1.7–7)
NITRITE UR QL STRIP: NEGATIVE
NRBC BLD AUTO-RTO: 0 /100 WBC (ref 0–0.2)
PH UR STRIP.AUTO: 5.5 [PH] (ref 5–8)
PLATELET # BLD AUTO: 284 10*3/MM3 (ref 140–450)
PMV BLD AUTO: 10 FL (ref 6–12)
PROT UR QL STRIP: NEGATIVE
RBC # BLD AUTO: 4.63 10*6/MM3 (ref 3.77–5.28)
RBC # UR STRIP: ABNORMAL /HPF
REF LAB TEST METHOD: ABNORMAL
RH BLD: POSITIVE
SP GR UR STRIP: 1.01 (ref 1–1.03)
SQUAMOUS #/AREA URNS HPF: ABNORMAL /HPF
T&S EXPIRATION DATE: NORMAL
UROBILINOGEN UR QL STRIP: ABNORMAL
WBC # UR STRIP: ABNORMAL /HPF
WBC NRBC COR # BLD: 12.68 10*3/MM3 (ref 3.4–10.8)
WHOLE BLOOD HOLD COAG: NORMAL
WHOLE BLOOD HOLD SPECIMEN: NORMAL

## 2023-03-17 PROCEDURE — 81001 URINALYSIS AUTO W/SCOPE: CPT | Performed by: STUDENT IN AN ORGANIZED HEALTH CARE EDUCATION/TRAINING PROGRAM

## 2023-03-17 PROCEDURE — 86850 RBC ANTIBODY SCREEN: CPT | Performed by: STUDENT IN AN ORGANIZED HEALTH CARE EDUCATION/TRAINING PROGRAM

## 2023-03-17 PROCEDURE — 85025 COMPLETE CBC W/AUTO DIFF WBC: CPT

## 2023-03-17 PROCEDURE — 84702 CHORIONIC GONADOTROPIN TEST: CPT | Performed by: STUDENT IN AN ORGANIZED HEALTH CARE EDUCATION/TRAINING PROGRAM

## 2023-03-17 PROCEDURE — 86901 BLOOD TYPING SEROLOGIC RH(D): CPT | Performed by: STUDENT IN AN ORGANIZED HEALTH CARE EDUCATION/TRAINING PROGRAM

## 2023-03-17 PROCEDURE — 76830 TRANSVAGINAL US NON-OB: CPT

## 2023-03-17 PROCEDURE — 99283 EMERGENCY DEPT VISIT LOW MDM: CPT

## 2023-03-17 PROCEDURE — 86900 BLOOD TYPING SEROLOGIC ABO: CPT | Performed by: STUDENT IN AN ORGANIZED HEALTH CARE EDUCATION/TRAINING PROGRAM

## 2023-03-17 PROCEDURE — 81025 URINE PREGNANCY TEST: CPT | Performed by: STUDENT IN AN ORGANIZED HEALTH CARE EDUCATION/TRAINING PROGRAM

## 2023-03-17 RX ORDER — SODIUM CHLORIDE 0.9 % (FLUSH) 0.9 %
10 SYRINGE (ML) INJECTION AS NEEDED
Status: DISCONTINUED | OUTPATIENT
Start: 2023-03-17 | End: 2023-03-18 | Stop reason: HOSPADM

## 2023-03-18 ENCOUNTER — HOSPITAL ENCOUNTER (EMERGENCY)
Facility: HOSPITAL | Age: 28
Discharge: HOME | End: 2023-03-18
Attending: STUDENT IN AN ORGANIZED HEALTH CARE EDUCATION/TRAINING PROGRAM | Admitting: STUDENT IN AN ORGANIZED HEALTH CARE EDUCATION/TRAINING PROGRAM
Payer: COMMERCIAL

## 2023-03-18 VITALS
HEART RATE: 82 BPM | HEIGHT: 69 IN | RESPIRATION RATE: 16 BRPM | BODY MASS INDEX: 34.8 KG/M2 | WEIGHT: 235 LBS | OXYGEN SATURATION: 96 % | DIASTOLIC BLOOD PRESSURE: 79 MMHG | SYSTOLIC BLOOD PRESSURE: 122 MMHG | TEMPERATURE: 98.7 F

## 2023-03-18 DIAGNOSIS — N93.9 EPISODE OF HEAVY VAGINAL BLEEDING: Primary | ICD-10-CM

## 2023-03-18 DIAGNOSIS — Z32.02 NEGATIVE PREGNANCY TEST: ICD-10-CM

## 2023-03-18 DIAGNOSIS — Z67.90 BLOOD TYPE, RH POSITIVE: ICD-10-CM

## 2023-03-18 LAB — HOLD SPECIMEN: NORMAL

## 2023-03-18 NOTE — DISCHARGE INSTRUCTIONS
Continue to monitor your symptoms treat pain with Tylenol and ibuprofen.  Ibuprofen can also help with the bleeding.  Follow-up with your OB.  While today's work-up was reassuring if your symptoms change or worsen please return to the ED or seek other medical care.

## 2023-03-19 NOTE — ED PROVIDER NOTES
EMERGENCY DEPARTMENT ENCOUNTER    Pt Name: Soni Zarate  MRN: [id number]  Pt :   1995  Room Number:  1616  Date of encounter:  3/17/2023  PCP: Lorelei Vann PA-C  ED Provider: Jim Meraz MD    Historian: Patient      HPI:  Chief Complaint: Heavy vaginal bleeding, possible pregnancy        Context: Soni Zarate is a 27 y.o. female who presents to the ED for heavy vaginal bleeding and pelvic cramping over the last 2 days.  She says she normally has very regular periods at the first of the month.  Her last period was in early February.  Starting on the third of this month she started taking pregnancy tests and they have all been negative.  Then for the last 2 days she has been having menstrual type cramping pain and initially was passing dark spots then started passing clots and she believes potentially tissue.  Pregnancy test remains negative.  She is actively trying to get pregnant.  No other complaints at this time.      PAST MEDICAL HISTORY  Past Medical History:   Diagnosis Date   • Abnormal Pap smear of cervix     HPV   • Anemia    • Anxiety    • Depression    • Kidney stones     last one    • Migraines    • SVT (supraventricular tachycardia) (HCC)     ablasion at age 13         PAST SURGICAL HISTORY  Past Surgical History:   Procedure Laterality Date   • CARDIAC ABLATION      Hx SVT   •  SECTION N/A 2018    Procedure:  SECTION PRIMARY;  Surgeon: Caitlin Martinez MD;  Location: [facility] LABOR DELIVERY;  Service: Obstetrics   • KIDNEY STONE SURGERY           FAMILY HISTORY  Family History   Problem Relation Age of Onset   • Diabetes Mother    • Hypertension Mother    • Mental illness Mother    • Migraines Mother    • Thyroid disease Mother    • Depression Mother    • Anxiety disorder Mother    • Diabetes Father    • Hyperlipidemia Father    • Mental illness Father    • Asthma Brother    • Heart attack Maternal Grandfather          SOCIAL  HISTORY  Social History     Socioeconomic History   • Marital status:    Tobacco Use   • Smoking status: Never   • Smokeless tobacco: Never   Vaping Use   • Vaping Use: Never used   Substance and Sexual Activity   • Alcohol use: No   • Drug use: No   • Sexual activity: Yes     Partners: Male         ALLERGIES  Patient has no known allergies.        REVIEW OF SYSTEMS  Review of Systems       All systems reviewed and negative except for those discussed in HPI.       PHYSICAL EXAM    I have reviewed the triage vital signs and nursing notes.    ED Triage Vitals [03/17/23 2126]   Temp Heart Rate Resp BP SpO2   98.7 °F (37.1 °C) 93 16 156/82 100 %      Temp src Heart Rate Source Patient Position BP Location FiO2 (%)   Oral -- Sitting Left arm --       Physical Exam  GENERAL:   Appears in no acute distress.   HENT: Nares patent.  EYES: No scleral icterus.  CV: Regular rhythm, regular rate.  RESPIRATORY: Normal effort.  No audible wheezes, rales or rhonchi.  ABDOMEN: Soft, nontender  MUSCULOSKELETAL: No deformities.   NEURO: Alert, moves all extremities, follows commands.  SKIN: Warm, dry, no rash visualized.      LAB RESULTS  Recent Results (from the past 24 hour(s))   Green Top (Gel)    Collection Time: 03/17/23 10:35 PM   Result Value Ref Range    Extra Tube Hold for add-ons.    Lavender Top    Collection Time: 03/17/23 10:35 PM   Result Value Ref Range    Extra Tube hold for add-on    Gold Top - SST    Collection Time: 03/17/23 10:35 PM   Result Value Ref Range    Extra Tube Hold for add-ons.    Gray Top    Collection Time: 03/17/23 10:35 PM   Result Value Ref Range    Extra Tube Hold for add-ons.    Light Blue Top    Collection Time: 03/17/23 10:35 PM   Result Value Ref Range    Extra Tube Hold for add-ons.    CBC Auto Differential    Collection Time: 03/17/23 10:35 PM    Specimen: Blood   Result Value Ref Range    WBC 12.68 (H) 3.40 - 10.80 10*3/mm3    RBC 4.63 3.77 - 5.28 10*6/mm3    Hemoglobin 11.2 (L) 12.0 -  15.9 g/dL    Hematocrit 37.1 34.0 - 46.6 %    MCV 80.1 79.0 - 97.0 fL    MCH 24.2 (L) 26.6 - 33.0 pg    MCHC 30.2 (L) 31.5 - 35.7 g/dL    RDW 16.1 (H) 12.3 - 15.4 %    RDW-SD 46.6 37.0 - 54.0 fl    MPV 10.0 6.0 - 12.0 fL    Platelets 284 140 - 450 10*3/mm3    Neutrophil % 77.2 (H) 42.7 - 76.0 %    Lymphocyte % 16.6 (L) 19.6 - 45.3 %    Monocyte % 4.4 (L) 5.0 - 12.0 %    Eosinophil % 1.2 0.3 - 6.2 %    Basophil % 0.2 0.0 - 1.5 %    Immature Grans % 0.4 0.0 - 0.5 %    Neutrophils, Absolute 9.79 (H) 1.70 - 7.00 10*3/mm3    Lymphocytes, Absolute 2.10 0.70 - 3.10 10*3/mm3    Monocytes, Absolute 0.56 0.10 - 0.90 10*3/mm3    Eosinophils, Absolute 0.15 0.00 - 0.40 10*3/mm3    Basophils, Absolute 0.03 0.00 - 0.20 10*3/mm3    Immature Grans, Absolute 0.05 0.00 - 0.05 10*3/mm3    nRBC 0.0 0.0 - 0.2 /100 WBC   hCG, Quantitative, Pregnancy    Collection Time: 03/17/23 10:35 PM    Specimen: Blood   Result Value Ref Range    HCG Quantitative <0.10 mIU/mL   Type & Screen    Collection Time: 03/17/23 10:35 PM    Specimen: Blood   Result Value Ref Range    ABO Type O     RH type Positive     Antibody Screen Negative     T&S Expiration Date 3/20/2023 11:59:59 PM    Urinalysis With Microscopic If Indicated (No Culture) - Urine, Clean Catch    Collection Time: 03/17/23 10:53 PM    Specimen: Urine, Clean Catch   Result Value Ref Range    Color, UA Yellow Yellow, Straw    Appearance, UA Clear Clear    pH, UA 5.5 5.0 - 8.0    Specific Gravity, UA 1.014 1.001 - 1.030    Glucose, UA Negative Negative    Ketones, UA Negative Negative    Bilirubin, UA Negative Negative    Blood, UA Large (3+) (A) Negative    Protein, UA Negative Negative    Leuk Esterase, UA Small (1+) (A) Negative    Nitrite, UA Negative Negative    Urobilinogen, UA 0.2 E.U./dL 0.2 - 1.0 E.U./dL   Urinalysis, Microscopic Only - Urine, Clean Catch    Collection Time: 03/17/23 10:53 PM    Specimen: Urine, Clean Catch   Result Value Ref Range    RBC, UA Too Numerous to Count (A)  None Seen, 0-2 /HPF    WBC, UA 13-20 (A) None Seen, 0-2 /HPF    Bacteria, UA None Seen None Seen, Trace /HPF    Squamous Epithelial Cells, UA 0-2 None Seen, 0-2 /HPF    Hyaline Casts, UA 0-6 0 - 6 /LPF    Methodology Automated Microscopy    POC Urine Pregnancy    Collection Time: 03/17/23 11:00 PM    Specimen: Urine   Result Value Ref Range    HCG, Urine, QL Negative Negative    Lot Number [id number]     Internal Positive Control Positive Positive, Passed    Internal Negative Control Negative Negative, Passed    Expiration Date 02/29/2024        If labs were ordered, I independently reviewed the results and considered them in treating the patient.        RADIOLOGY  US Non-ob Transvaginal    Result Date: 3/18/2023  EXAM:  US NON-OB TRANSVAGINAL   DATE: 3/17/2023 11:49 PM HISTORY:   vaginal bleeding, brown discharge x 5 days and then changed to bright red with pieces of tissues that is still currently going on with extreme lower abdominal and pelvic pain and cramping more left sided x 1 day.. TECHNIQUE: Greyscale images were obtained. Color Doppler and duplex imaging with spectral analysis of arterial and venous waveform patterns  were attempted COMPARISON:  None. FINDINGS: UTERUS: Measures 8.8 x 4.2 x 4.9 cm. No uterine mass or focal parenchymal abnormality. ENDOMETRIUM: Measures 11 mm RIGHT OVARY: Ovary was not visible by ultrasonographer.  Possible reasons for non visualization include but not limited to: overlying bowel gas, outside field of view, body habitus, and atrophy.  LEFT OVARY: Measures 4.6 x 3.7 x 3.9 cm. Simple hypoechoic cyst 4.2 x 3.2 x 3.5 cm. Duplex Doppler interrogation demonstrates normal arterial inflow and venous outflow spectral waveform patterns. FREE FLUID: Small amount.     1.  Simple appearing left ovarian cyst measuring up to 4.2 cm. 2.  Left ovary has normal flow. Right ovary not seen. Electronically signed by:  Marlys Duran M.D.  3/17/2023 10:40 PM Mountain Time      I ordered and  independently reviewed the above noted radiographic studies.      I viewed images of transvaginal ultrasound which shows empty uterus, left-sided ovarian cyst    See radiologist's dictation for official interpretation.        PROCEDURES    Procedures    No orders to display       MEDICATIONS GIVEN IN ER    Medications - No data to display      MEDICAL DECISION MAKING, PROGRESS, and CONSULTS    All labs have been independently reviewed by me.  All radiology studies have been reviewed by me and the radiologist dictating the report.  All EKG's have been independently viewed and interpreted by me.      Discussion below represents my analysis of pertinent findings related to patient's condition, differential diagnosis, treatment plan and final disposition.      Differential diagnosis:  Ectopic pregnancy, threatened , completed , abnormal uterine bleeding, heavy menses, need for Rh immunization      Additional sources:    - Discussed/ obtained information from independent historians:      - External (non-ED) record review: PCP notes for migraines, anxiety, amenorrhea on the sixth of this month    - Chronic or social conditions impacting care:      - Shared decision making:        Orders placed during this visit:  Orders Placed This Encounter   Procedures   • US Non-ob Transvaginal   • [place] Draw   • CBC Auto Differential   • hCG, Quantitative, Pregnancy   • Urinalysis With Microscopic If Indicated (No Culture) - Urine, Clean Catch   • Urinalysis, Microscopic Only - Urine, Clean Catch   • Undress & Gown   • Vital Signs   • POC Urine Pregnancy   • Type & Screen   • CBC & Differential   • Green Top (Gel)   • Lavender Top   • Gold Top - SST   • Gray Top   • Light Blue Top         Additional orders considered but not ordered:      ED Course:    Consultants:         She arrived awake and alert well-appearing vitals are within normal limits.  Concern for threatened  as she is Rh+ blood type O no need  for RhoGAM alloimmunization.  CBC shows mild anemia hemoglobin 11.2 significantly increased from previous baseline though this is from several years ago.  Both urine and quantitative hCG are undetectably low.  She has blood and white cells in her urine though she denies dysuria she thinks this is just from her menstrual bleeding.  She has pictures of what she thinks is passed tissue this appears just to be clot to me.  Transvaginal ultrasound shows empty uterus and left-sided ovarian cyst, with negative pregnancy test I have no concern for tubal or ectopic pregnancy.  I suppose it is possible that she has a completed  but my guess is this is more than likely just abnormal uterine bleeding.  Either way I think she is safe for discharge to continue to monitor her symptoms.  She will follow-up with OB/GYN.  Counseled on strict return precautions verbally expressed understanding of these.             AS OF 20:39 EDT VITALS:    BP - 122/79  HR - 82  TEMP - 98.7 °F (37.1 °C) (Oral)  O2 SATS - 96%                  DIAGNOSIS  Final diagnoses:   Episode of heavy vaginal bleeding   Negative pregnancy test   Blood type, Rh positive         DISPOSITION  DISCHARGE    Patient discharged in stable condition.    Reviewed implications of results, diagnosis, meds, responsibility to follow up, warning signs and symptoms of possible worsening, potential complications and reasons to return to ER.    Patient/Family voiced understanding of above instructions.    Discussed plan for discharge, as there is no emergent indication for admission.  Pt/family is agreeable and understands need for follow up and possible repeat testing.  Pt/family is aware that discharge does not mean that nothing is wrong but that it indicates no emergency is currently present that requires admission and they must continue care with follow-up as given below or with a physician of their choice.     FOLLOW-UP  Lorelei Vann PA-C  [address]  [facility] [address] [phone]    Call       Brooklyn Metcalf MD  [address] [id number] [phone]    Call   If you would like to establish with an obstetrician.         Medication List      No changes were made to your prescriptions during this visit.             Please note that portions of this document were completed with voice recognition software.        Jim Meraz MD  03/18/23 7094

## 2023-05-15 ENCOUNTER — TELEPHONE (OUTPATIENT)
Dept: INTERNAL MEDICINE | Facility: CLINIC | Age: 28
End: 2023-05-15
Payer: COMMERCIAL

## 2023-05-15 NOTE — TELEPHONE ENCOUNTER
Key: [id number]    No PA required for this medication     I have called and left a message for Soni with return phone number.

## 2023-05-31 ENCOUNTER — OFFICE VISIT (OUTPATIENT)
Dept: INTERNAL MEDICINE | Facility: CLINIC | Age: 28
End: 2023-05-31

## 2023-05-31 VITALS
HEIGHT: 69 IN | RESPIRATION RATE: 18 BRPM | DIASTOLIC BLOOD PRESSURE: 80 MMHG | WEIGHT: 239 LBS | SYSTOLIC BLOOD PRESSURE: 120 MMHG | BODY MASS INDEX: 35.4 KG/M2 | TEMPERATURE: 98 F | HEART RATE: 82 BPM

## 2023-05-31 DIAGNOSIS — H10.31 ACUTE CONJUNCTIVITIS OF RIGHT EYE, UNSPECIFIED ACUTE CONJUNCTIVITIS TYPE: Primary | ICD-10-CM

## 2023-05-31 PROBLEM — E66.01 MORBID (SEVERE) OBESITY DUE TO EXCESS CALORIES: Status: ACTIVE | Noted: 2023-05-31

## 2023-05-31 PROBLEM — E66.9 OBESITY (BMI 35.0-39.9 WITHOUT COMORBIDITY): Status: ACTIVE | Noted: 2023-05-31

## 2023-05-31 PROCEDURE — 99213 OFFICE O/P EST LOW 20 MIN: CPT | Performed by: PHYSICIAN ASSISTANT

## 2023-05-31 RX ORDER — POLYMYXIN B SULFATE AND TRIMETHOPRIM 1; 10000 MG/ML; [USP'U]/ML
1 SOLUTION OPHTHALMIC EVERY 4 HOURS
Qty: 10 ML | Refills: 0 | Status: SHIPPED | OUTPATIENT
Start: 2023-05-31

## 2023-05-31 NOTE — PROGRESS NOTES
Office Note     Name: Soni Zarate    : 1995     MRN: [id number]     Chief Complaint  Itchy Eye (Right eye . Started yesterday night. Eye pain and eye drainage. )    Subjective     History of Present Illness:  Soni Zarate is a 27 y.o. female who presents today for possible pink eye of right eye.  Patient denies any injury to eye.  Patient denies any visual changes. Patient does report eye is itching and purulent discharge. Patient has attempted no home treatment.    Review of Systems:   Review of Systems    Past Medical History:   Past Medical History:   Diagnosis Date    Abnormal Pap smear of cervix     HPV    Anemia     Anxiety     Depression     Kidney stones     last one     Migraines     SVT (supraventricular tachycardia) 2008    ablasion at age 13       Past Surgical History:   Past Surgical History:   Procedure Laterality Date    CARDIAC ABLATION      Hx SVT     SECTION N/A 2018    Procedure:  SECTION PRIMARY;  Surgeon: Cailtin Martinez MD;  Location: [facility] LABOR DELIVERY;  Service: Obstetrics    KIDNEY STONE SURGERY         Immunizations:   Immunization History   Administered Date(s) Administered    COVID-19 (PFIZER) Purple Cap Monovalent 2021    Influenza, Unspecified 10/21/2015, 10/26/2018    MMR 2018        Medications:     Current Outpatient Medications:     Cholecalciferol (Vitamin D3) 25 MCG (1000 UT) capsule, Take 1 capsule by mouth Daily., Disp: , Rfl:     Glucagon 0.5 MG/0.1ML solution auto-injector, Inject 0.5 mg under the skin into the appropriate area as directed As Needed (low blood glucose)., Disp: 0.1 mL, Rfl: 0    glucose blood test strip, Use as instructed, Disp: 100 each, Rfl: 12    glucose monitoring kit (FREESTYLE) monitoring kit, 1 each As Needed (blood sugar check)., Disp: 1 each, Rfl: 0    Lancets (freestyle) lancets, TID PRN blood glucose check, Disp: 100 each, Rfl: 12    multivitamin with minerals tablet tablet,  "Take 1 tablet by mouth Daily., Disp: , Rfl:     Semaglutide-Weight Management 0.25 MG/0.5ML solution auto-injector, Inject 0.25 mg under the skin into the appropriate area as directed 1 (One) Time Per Week. BRAND ONLY, Disp: 2 mL, Rfl: 0    SUMAtriptan (IMITREX) 100 MG tablet, TAKE 1 TABLET BY MOUTH AT ONSET OF HEADACHE. MAY REPEAT DOSE 1 TIME IN 2 HOURS IF HEADACHE NOT RELIEVED, Disp: 9 tablet, Rfl: 5    triamcinolone (KENALOG) 0.1 % cream, Apply  topically to the appropriate area as directed 2 (Two) Times a Day As Needed for Rash., Disp: 30 g, Rfl: 1    trimethoprim-polymyxin b (Polytrim) 57033-8.1 UNIT/ML-% ophthalmic solution, Administer 1 drop to the right eye Every 4 (Four) Hours., Disp: 10 mL, Rfl: 0    Allergies:   No Known Allergies    Family History:   Family History   Problem Relation Age of Onset    Diabetes Mother     Hypertension Mother     Mental illness Mother     Migraines Mother     Thyroid disease Mother     Depression Mother     Anxiety disorder Mother     Diabetes Father     Hyperlipidemia Father     Mental illness Father     Asthma Brother     Heart attack Maternal Grandfather        Social History:   Social History     Socioeconomic History    Marital status:    Tobacco Use    Smoking status: Never    Smokeless tobacco: Never   Vaping Use    Vaping Use: Never used   Substance and Sexual Activity    Alcohol use: No    Drug use: No    Sexual activity: Yes     Partners: Male         Objective     Vital Signs  /80 (BP Location: Right arm, Patient Position: Sitting, Cuff Size: Adult)   Pulse 82   Temp 98 °F (36.7 °C) (Infrared)   Resp 18   Ht 175.3 cm (69\")   Wt 108 kg (239 lb)   BMI 35.29 kg/m²   Estimated body mass index is 35.29 kg/m² as calculated from the following:    Height as of this encounter: 175.3 cm (69\").    Weight as of this encounter: 108 kg (239 lb).          Physical Exam  Vitals and nursing note reviewed.   Constitutional:       Appearance: Normal appearance. "   Eyes:      General:         Right eye: Discharge present. No foreign body.      Conjunctiva/sclera:      Right eye: Right conjunctiva is injected. Exudate present.      Comments: Mild edema right eyelid upper and lower   Cardiovascular:      Rate and Rhythm: Normal rate and regular rhythm.      Pulses: Normal pulses.      Heart sounds: Normal heart sounds.   Pulmonary:      Effort: Pulmonary effort is normal.   Skin:     General: Skin is warm.   Neurological:      General: No focal deficit present.      Mental Status: She is alert.        Assessment and Plan     1. Acute conjunctivitis of right eye, unspecified acute conjunctivitis type  - trimethoprim-polymyxin b (Polytrim) 82884-9.1 UNIT/ML-% ophthalmic solution; Administer 1 drop to the right eye Every 4 (Four) Hours.  Dispense: 10 mL; Refill: 0     Discussed possible complications including periorbital cellulitis.    I spent approximately 20 minutes providing clinical care for this patient; including review of patient's chart and provider documentation, face to face time spent with patient in examination room (obtaining history, performing physical exam, discussing diagnosis and management options), placing orders, and completing patient documentation    Patient verbalized good understanding of diagnosis and treatment plan.  All questions answered to their satisfaction.      Follow Up  No follow-ups on file.    CHRISSY Daly [facility]  [address] [address] [phone]

## 2023-05-31 NOTE — PATIENT INSTRUCTIONS
MyPlate from USDA  MyPlate is an outline of a general healthy diet based on the Dietary Guidelines for Americans, 5927-0161, from the U.S. Department of Agriculture (USDA). It sets guidelines for how much food you should eat from each food group based on your age, sex, and level of physical activity.  What are tips for following MyPlate?  To follow MyPlate recommendations:  • Eat a wide variety of fruits and vegetables, grains, and protein foods.  • Serve smaller portions and eat less food throughout the day.  • Limit portion sizes to avoid overeating.  • Enjoy your food.  • Get at least 150 minutes of exercise every week. This is about 30 minutes each day, 5 or more days per week.  It can be difficult to have every meal look like MyPlate. Think about MyPlate as eating guidelines for an entire day, rather than each individual meal.  Fruits and vegetables  • Make one half of your plate fruits and vegetables.  • Eat many different colors of fruits and vegetables each day.  • For a 2,000-calorie daily food plan, eat:  ? 2½ cups of vegetables every day.  ? 2 cups of fruit every day.  • 1 cup is equal to:  ? 1 cup raw or cooked vegetables.  ? 1 cup raw fruit.  ? 1 medium-sized orange, apple, or banana.  ? 1 cup 100% fruit or vegetable juice.  ? 2 cups raw leafy greens, such as lettuce, spinach, or kale.  ? ½ cup dried fruit.  Grains  • One fourth of your plate should be grains.  • Make at least half of the grains you eat each day whole grains.  • For a 2,000-calorie daily food plan, eat 6 oz of grains every day.  • 1 oz is equal to:  ? 1 slice bread.  ? 1 cup cereal.  ? ½ cup cooked rice, cereal, or pasta.  Protein  • One fourth of your plate should be protein.  • Eat a wide variety of protein foods, including meat, poultry, fish, eggs, beans, nuts, and tofu.  • For a 2,000-calorie daily food plan, eat 5½ oz of protein every day.  • 1 oz is equal to:  ? 1 oz meat, poultry, or fish.  ? ¼ cup cooked beans.  ? 1  egg.  ? ½ oz nuts or seeds.  ? 1 Tbsp peanut butter.  Dairy  • Drink fat-free or low-fat (1%) milk.  • Eat or drink dairy as a side to meals.  • For a 2,000-calorie daily food plan, eat or drink 3 cups of dairy every day.  • 1 cup is equal to:  ? 1 cup milk, yogurt, cottage cheese, or soy milk (soy beverage).  ? 2 oz processed cheese.  ? 1½ oz natural cheese.  Fats, oils, salt, and sugars  • Only small amounts of oils are recommended.  • Avoid foods that are high in calories and low in nutritional value (empty calories), like foods high in fat or added sugars.  • Choose foods that are low in salt (sodium). Choose foods that have less than 140 milligrams (mg) of sodium per serving.  • Drink water instead of sugary drinks. Drink enough fluid to keep your urine pale yellow.  Where to find support  • Work with your health care provider or a dietitian to develop a customized eating plan that is right for you.  • Download an narendra (mobile application) to help you track your daily food intake.  Where to find more information  • USDA: ChooseMyPlate.gov  Summary  • MyPlate is a general guideline for healthy eating from the USDA. It is based on the Dietary Guidelines for Americans, 9670-3082.  • In general, fruits and vegetables should take up one half of your plate, grains should take up one fourth of your plate, and protein should take up one fourth of your plate.  This information is not intended to replace advice given to you by your health care provider. Make sure you discuss any questions you have with your health care provider.  Document Revised: 11/08/2021 Document Reviewed: 11/08/2021  Elsevier Patient Education © 2022 Elsevier Inc.

## 2023-06-13 DIAGNOSIS — G43.009 MIGRAINE WITHOUT AURA AND WITHOUT STATUS MIGRAINOSUS, NOT INTRACTABLE: ICD-10-CM

## 2023-06-13 RX ORDER — SUMATRIPTAN 100 MG/1
TABLET, FILM COATED ORAL
Qty: 9 TABLET | Refills: 5 | Status: SHIPPED | OUTPATIENT
Start: 2023-06-13

## 2023-07-21 ENCOUNTER — HOSPITAL ENCOUNTER (EMERGENCY)
Facility: HOSPITAL | Age: 28
Discharge: HOME | End: 2023-07-21
Attending: EMERGENCY MEDICINE | Admitting: EMERGENCY MEDICINE
Payer: COMMERCIAL

## 2023-07-21 VITALS
OXYGEN SATURATION: 95 % | HEART RATE: 80 BPM | RESPIRATION RATE: 18 BRPM | HEIGHT: 70 IN | SYSTOLIC BLOOD PRESSURE: 126 MMHG | DIASTOLIC BLOOD PRESSURE: 85 MMHG | BODY MASS INDEX: 32.21 KG/M2 | TEMPERATURE: 99 F | WEIGHT: 225 LBS

## 2023-07-21 VITALS
RESPIRATION RATE: 18 BRPM | BODY MASS INDEX: 32.21 KG/M2 | HEART RATE: 96 BPM | SYSTOLIC BLOOD PRESSURE: 119 MMHG | OXYGEN SATURATION: 94 % | TEMPERATURE: 98 F | WEIGHT: 225 LBS | DIASTOLIC BLOOD PRESSURE: 69 MMHG | HEIGHT: 70 IN

## 2023-07-21 DIAGNOSIS — R10.9 LEFT FLANK PAIN: Primary | ICD-10-CM

## 2023-07-21 DIAGNOSIS — D72.829 LEUKOCYTOSIS, UNSPECIFIED TYPE: ICD-10-CM

## 2023-07-21 DIAGNOSIS — R11.2 NAUSEA AND VOMITING, UNSPECIFIED VOMITING TYPE: ICD-10-CM

## 2023-07-21 DIAGNOSIS — N20.0 KIDNEY STONE ON LEFT SIDE: Primary | ICD-10-CM

## 2023-07-21 LAB
ALBUMIN SERPL-MCNC: 4.3 G/DL (ref 3.5–5.2)
ALBUMIN/GLOB SERPL: 1.6 G/DL
ALP SERPL-CCNC: 87 U/L (ref 39–117)
ALT SERPL W P-5'-P-CCNC: 16 U/L (ref 1–33)
ANION GAP SERPL CALCULATED.3IONS-SCNC: 14 MMOL/L (ref 5–15)
AST SERPL-CCNC: 21 U/L (ref 1–32)
B-HCG UR QL: NEGATIVE
BACTERIA UR QL AUTO: ABNORMAL /HPF
BASOPHILS # BLD AUTO: 0.05 10*3/MM3 (ref 0–0.2)
BASOPHILS NFR BLD AUTO: 0.4 % (ref 0–1.5)
BILIRUB SERPL-MCNC: 0.2 MG/DL (ref 0–1.2)
BILIRUB UR QL STRIP: NEGATIVE
BUN SERPL-MCNC: 11 MG/DL (ref 6–20)
BUN/CREAT SERPL: 10.3 (ref 7–25)
CALCIUM SPEC-SCNC: 9 MG/DL (ref 8.6–10.5)
CHLORIDE SERPL-SCNC: 106 MMOL/L (ref 98–107)
CLARITY UR: ABNORMAL
CO2 SERPL-SCNC: 20 MMOL/L (ref 22–29)
COLOR UR: YELLOW
CREAT SERPL-MCNC: 1.07 MG/DL (ref 0.57–1)
DEPRECATED RDW RBC AUTO: 46.9 FL (ref 37–54)
EGFRCR SERPLBLD CKD-EPI 2021: 72.3 ML/MIN/1.73
EOSINOPHIL # BLD AUTO: 0.14 10*3/MM3 (ref 0–0.4)
EOSINOPHIL NFR BLD AUTO: 1.1 % (ref 0.3–6.2)
ERYTHROCYTE [DISTWIDTH] IN BLOOD BY AUTOMATED COUNT: 16.5 % (ref 12.3–15.4)
EXPIRATION DATE: NORMAL
GLOBULIN UR ELPH-MCNC: 2.7 GM/DL
GLUCOSE SERPL-MCNC: 126 MG/DL (ref 65–99)
GLUCOSE UR STRIP-MCNC: NEGATIVE MG/DL
HCT VFR BLD AUTO: 37.6 % (ref 34–46.6)
HGB BLD-MCNC: 11.5 G/DL (ref 12–15.9)
HGB UR QL STRIP.AUTO: ABNORMAL
HOLD SPECIMEN: NORMAL
HOLD SPECIMEN: NORMAL
HYALINE CASTS UR QL AUTO: ABNORMAL /LPF
IMM GRANULOCYTES # BLD AUTO: 0.04 10*3/MM3 (ref 0–0.05)
IMM GRANULOCYTES NFR BLD AUTO: 0.3 % (ref 0–0.5)
INTERNAL NEGATIVE CONTROL: NEGATIVE
INTERNAL POSITIVE CONTROL: POSITIVE
KETONES UR QL STRIP: ABNORMAL
LEUKOCYTE ESTERASE UR QL STRIP.AUTO: ABNORMAL
LIPASE SERPL-CCNC: 28 U/L (ref 13–60)
LYMPHOCYTES # BLD AUTO: 2.86 10*3/MM3 (ref 0.7–3.1)
LYMPHOCYTES NFR BLD AUTO: 22.7 % (ref 19.6–45.3)
Lab: NORMAL
MCH RBC QN AUTO: 23.8 PG (ref 26.6–33)
MCHC RBC AUTO-ENTMCNC: 30.6 G/DL (ref 31.5–35.7)
MCV RBC AUTO: 77.7 FL (ref 79–97)
MONOCYTES # BLD AUTO: 0.83 10*3/MM3 (ref 0.1–0.9)
MONOCYTES NFR BLD AUTO: 6.6 % (ref 5–12)
NEUTROPHILS NFR BLD AUTO: 68.9 % (ref 42.7–76)
NEUTROPHILS NFR BLD AUTO: 8.7 10*3/MM3 (ref 1.7–7)
NITRITE UR QL STRIP: NEGATIVE
NRBC BLD AUTO-RTO: 0 /100 WBC (ref 0–0.2)
PH UR STRIP.AUTO: 8 [PH] (ref 5–8)
PLATELET # BLD AUTO: 285 10*3/MM3 (ref 140–450)
PMV BLD AUTO: 10.8 FL (ref 6–12)
POTASSIUM SERPL-SCNC: 4.2 MMOL/L (ref 3.5–5.2)
PROT SERPL-MCNC: 7 G/DL (ref 6–8.5)
PROT UR QL STRIP: ABNORMAL
RBC # BLD AUTO: 4.84 10*6/MM3 (ref 3.77–5.28)
RBC # UR STRIP: ABNORMAL /HPF
REF LAB TEST METHOD: ABNORMAL
RENAL EPI CELLS #/AREA URNS HPF: ABNORMAL /HPF
SODIUM SERPL-SCNC: 140 MMOL/L (ref 136–145)
SP GR UR STRIP: 1.02 (ref 1–1.03)
SQUAMOUS #/AREA URNS HPF: ABNORMAL /HPF
TRANS CELLS #/AREA URNS HPF: ABNORMAL /HPF
UROBILINOGEN UR QL STRIP: ABNORMAL
WBC # UR STRIP: ABNORMAL /HPF
WBC NRBC COR # BLD: 12.62 10*3/MM3 (ref 3.4–10.8)
WHOLE BLOOD HOLD COAG: NORMAL
WHOLE BLOOD HOLD SPECIMEN: NORMAL

## 2023-07-21 PROCEDURE — 96376 TX/PRO/DX INJ SAME DRUG ADON: CPT

## 2023-07-21 PROCEDURE — 96374 THER/PROPH/DIAG INJ IV PUSH: CPT

## 2023-07-21 PROCEDURE — 96375 TX/PRO/DX INJ NEW DRUG ADDON: CPT

## 2023-07-21 PROCEDURE — 83690 ASSAY OF LIPASE: CPT | Performed by: EMERGENCY MEDICINE

## 2023-07-21 PROCEDURE — 99283 EMERGENCY DEPT VISIT LOW MDM: CPT

## 2023-07-21 PROCEDURE — 99284 EMERGENCY DEPT VISIT MOD MDM: CPT

## 2023-07-21 PROCEDURE — 25010000002 KETOROLAC TROMETHAMINE PER 15 MG: Performed by: EMERGENCY MEDICINE

## 2023-07-21 PROCEDURE — 81001 URINALYSIS AUTO W/SCOPE: CPT | Performed by: EMERGENCY MEDICINE

## 2023-07-21 PROCEDURE — 25010000002 ONDANSETRON PER 1 MG: Performed by: EMERGENCY MEDICINE

## 2023-07-21 PROCEDURE — 80053 COMPREHEN METABOLIC PANEL: CPT | Performed by: EMERGENCY MEDICINE

## 2023-07-21 PROCEDURE — 81025 URINE PREGNANCY TEST: CPT | Performed by: EMERGENCY MEDICINE

## 2023-07-21 PROCEDURE — 85025 COMPLETE CBC W/AUTO DIFF WBC: CPT | Performed by: EMERGENCY MEDICINE

## 2023-07-21 PROCEDURE — 25010000002 HYDROMORPHONE PER 4 MG: Performed by: EMERGENCY MEDICINE

## 2023-07-21 PROCEDURE — 25010000002 DROPERIDOL PER 5 MG: Performed by: EMERGENCY MEDICINE

## 2023-07-21 RX ORDER — HYDROMORPHONE HYDROCHLORIDE 1 MG/ML
0.5 INJECTION, SOLUTION INTRAMUSCULAR; INTRAVENOUS; SUBCUTANEOUS ONCE
Status: COMPLETED | OUTPATIENT
Start: 2023-07-21 | End: 2023-07-21

## 2023-07-21 RX ORDER — KETOROLAC TROMETHAMINE 15 MG/ML
15 INJECTION, SOLUTION INTRAMUSCULAR; INTRAVENOUS ONCE
Status: COMPLETED | OUTPATIENT
Start: 2023-07-21 | End: 2023-07-21

## 2023-07-21 RX ORDER — ONDANSETRON 4 MG/1
4 TABLET, FILM COATED ORAL EVERY 8 HOURS PRN
Qty: 15 TABLET | Refills: 0 | Status: SHIPPED | OUTPATIENT
Start: 2023-07-21

## 2023-07-21 RX ORDER — ONDANSETRON 2 MG/ML
4 INJECTION INTRAMUSCULAR; INTRAVENOUS ONCE
Status: COMPLETED | OUTPATIENT
Start: 2023-07-21 | End: 2023-07-21

## 2023-07-21 RX ORDER — HYDROCODONE BITARTRATE AND ACETAMINOPHEN 5; 325 MG/1; MG/1
1 TABLET ORAL ONCE
Status: COMPLETED | OUTPATIENT
Start: 2023-07-21 | End: 2023-07-21

## 2023-07-21 RX ORDER — OXYCODONE HYDROCHLORIDE AND ACETAMINOPHEN 5; 325 MG/1; MG/1
1 TABLET ORAL EVERY 8 HOURS PRN
Qty: 12 TABLET | Refills: 0 | Status: SHIPPED | OUTPATIENT
Start: 2023-07-21

## 2023-07-21 RX ORDER — DROPERIDOL 2.5 MG/ML
2.5 INJECTION, SOLUTION INTRAMUSCULAR; INTRAVENOUS ONCE
Status: COMPLETED | OUTPATIENT
Start: 2023-07-21 | End: 2023-07-21

## 2023-07-21 RX ORDER — SODIUM CHLORIDE 9 MG/ML
10 INJECTION INTRAVENOUS AS NEEDED
Status: DISCONTINUED | OUTPATIENT
Start: 2023-07-21 | End: 2023-07-21 | Stop reason: HOSPADM

## 2023-07-21 RX ADMIN — HYDROMORPHONE HYDROCHLORIDE 0.5 MG: 1 INJECTION, SOLUTION INTRAMUSCULAR; INTRAVENOUS; SUBCUTANEOUS at 06:08

## 2023-07-21 RX ADMIN — HYDROCODONE BITARTRATE AND ACETAMINOPHEN 1 TABLET: 5; 325 TABLET ORAL at 07:52

## 2023-07-21 RX ADMIN — SODIUM CHLORIDE 500 ML: 9 INJECTION, SOLUTION INTRAVENOUS at 10:12

## 2023-07-21 RX ADMIN — SODIUM CHLORIDE 1000 ML: 9 INJECTION, SOLUTION INTRAVENOUS at 06:09

## 2023-07-21 RX ADMIN — ONDANSETRON 4 MG: 2 INJECTION INTRAMUSCULAR; INTRAVENOUS at 06:08

## 2023-07-21 RX ADMIN — DROPERIDOL 2.5 MG: 2.5 INJECTION, SOLUTION INTRAMUSCULAR; INTRAVENOUS at 10:12

## 2023-07-21 RX ADMIN — KETOROLAC TROMETHAMINE 15 MG: 15 INJECTION, SOLUTION INTRAMUSCULAR; INTRAVENOUS at 07:53

## 2023-07-21 RX ADMIN — KETOROLAC TROMETHAMINE 15 MG: 15 INJECTION, SOLUTION INTRAMUSCULAR; INTRAVENOUS at 06:08

## 2023-07-21 NOTE — ED PROVIDER NOTES
"Subjective   History of Present Illness  27-year-old female presents for evaluation of left flank pain and nausea/vomiting.  Of note, I saw the patient here in the emergency department for the same earlier this morning.  She had gone to [facility] last night for a kidney stone and came here this morning for persistent symptoms.  Her pain was adequately controlled and she was discharged home with prescriptions for Percocet, Zofran, and was tolerating p.o.  Her labs were bland.  I reviewed her CT scan results from earlier at [facility] and performed a bedside ultrasound which revealed no evidence of hydronephrosis.  Shortly after being discharged, she ended up going home and called EMS for persistent pain and recurrent nausea and vomiting.  She currently rates her pain at 7 out of 10 in severity and notes that she \"could not keep anything down.\"    Review of Systems   Gastrointestinal:  Positive for nausea and vomiting.   Genitourinary:  Positive for flank pain.   All other systems reviewed and are negative.    Past Medical History:   Diagnosis Date    Abnormal Pap smear of cervix     HPV    Anemia     Anxiety     Depression     Kidney stones     last one     Migraines     SVT (supraventricular tachycardia) 2008    ablasion at age 13       No Known Allergies    Past Surgical History:   Procedure Laterality Date    CARDIAC ABLATION      Hx SVT     SECTION N/A 2018    Procedure:  SECTION PRIMARY;  Surgeon: Caitlin Martinez MD;  Location: [facility] LABOR DELIVERY;  Service: Obstetrics    KIDNEY STONE SURGERY         Family History   Problem Relation Age of Onset    Diabetes Mother     Hypertension Mother     Mental illness Mother     Migraines Mother     Thyroid disease Mother     Depression Mother     Anxiety disorder Mother     Diabetes Father     Hyperlipidemia Father     Mental illness Father     Asthma Brother     Heart attack Maternal Grandfather        Social History "     Socioeconomic History    Marital status:    Tobacco Use    Smoking status: Never    Smokeless tobacco: Never   Vaping Use    Vaping Use: Never used   Substance and Sexual Activity    Alcohol use: No    Drug use: No    Sexual activity: Yes     Partners: Male           Objective   Physical Exam  Vitals and nursing note reviewed.   Constitutional:       Appearance: She is well-developed. She is not diaphoretic.      Comments: Nontoxic-appearing female   HENT:      Head: Normocephalic and atraumatic.   Cardiovascular:      Rate and Rhythm: Normal rate and regular rhythm.      Heart sounds: Normal heart sounds. No murmur heard.    No friction rub. No gallop.   Pulmonary:      Effort: Pulmonary effort is normal. No respiratory distress.      Breath sounds: Normal breath sounds. No wheezing or rales.   Abdominal:      General: Bowel sounds are normal. There is no distension.      Palpations: Abdomen is soft. There is no mass.      Tenderness: There is no abdominal tenderness. There is no guarding or rebound.      Comments: No focal abdominal tenderness, no peritoneal signs, no pain out of proportion to exam   Genitourinary:     Comments: No CVA tenderness present  Musculoskeletal:         General: Normal range of motion.   Skin:     General: Skin is warm and dry.      Findings: No erythema or rash.      Comments: No dermatomal rash noted   Neurological:      Mental Status: She is alert and oriented to person, place, and time.   Psychiatric:         Mood and Affect: Mood normal.         Thought Content: Thought content normal.         Judgment: Judgment normal.       Procedures           ED Course  ED Course as of 07/21/23 1317   Fri Jul 21, 2023   1101 27-year-old female presents for evaluation of left flank pain and nausea/vomiting.  Of note, I saw the patient for the same here in the emergency department earlier this morning.  She had gone to [facility] last night for a kidney stone and came here  this morning for persistent symptoms.  Her pain was adequately controlled and she was discharged home with prescriptions for Percocet, Zofran, and was tolerating p.o.  She called EMS after going home she had persistent pain accompanied by recurrent nausea and vomiting.  On arrival to the ED, the patient is anxious appearing but nontoxic.  Her exam is unchanged.  Nonsurgical abdomen.  No CVA tenderness noted.  No dermatomal rash present.  Patient given IV fluids and droperidol.  Upon reevaluation, she looks and feels well.  She is tolerating p.o.  She is not vomiting.  I feel that she can be discharged home safely and follow-up as we previously discussed.  She already has medications to help with her symptoms at home and will continue to use them as needed.  Agreeable with plan and given appropriate strict return precautions. [DD]      ED Course User Index  [DD] Alexander Zelaya MD                Recent Results (from the past 24 hour(s))   Pregnancy, Urine -    Collection Time: 07/20/23 11:49 PM    Specimen: Urine   Result Value Ref Range    HCG, Urine QL Negative Negative, Inconclusive   Comprehensive Metabolic Panel    Collection Time: 07/21/23  5:51 AM    Specimen: Blood   Result Value Ref Range    Glucose 126 (H) 65 - 99 mg/dL    BUN 11 6 - 20 mg/dL    Creatinine 1.07 (H) 0.57 - 1.00 mg/dL    Sodium 140 136 - 145 mmol/L    Potassium 4.2 3.5 - 5.2 mmol/L    Chloride 106 98 - 107 mmol/L    CO2 20.0 (L) 22.0 - 29.0 mmol/L    Calcium 9.0 8.6 - 10.5 mg/dL    Total Protein 7.0 6.0 - 8.5 g/dL    Albumin 4.3 3.5 - 5.2 g/dL    ALT (SGPT) 16 1 - 33 U/L    AST (SGOT) 21 1 - 32 U/L    Alkaline Phosphatase 87 39 - 117 U/L    Total Bilirubin 0.2 0.0 - 1.2 mg/dL    Globulin 2.7 gm/dL    A/G Ratio 1.6 g/dL    BUN/Creatinine Ratio 10.3 7.0 - 25.0    Anion Gap 14.0 5.0 - 15.0 mmol/L    eGFR 72.3 >60.0 mL/min/1.73   Lipase    Collection Time: 07/21/23  5:51 AM    Specimen: Blood   Result Value Ref Range    Lipase 28 13 - 60 U/L    Green Top (Gel)    Collection Time: 07/21/23  5:51 AM   Result Value Ref Range    Extra Tube Hold for add-ons.    Lavender Top    Collection Time: 07/21/23  5:51 AM   Result Value Ref Range    Extra Tube hold for add-on    Gray Top    Collection Time: 07/21/23  5:51 AM   Result Value Ref Range    Extra Tube Hold for add-ons.    Light Blue Top    Collection Time: 07/21/23  5:51 AM   Result Value Ref Range    Extra Tube Hold for add-ons.    CBC Auto Differential    Collection Time: 07/21/23  5:51 AM    Specimen: Blood   Result Value Ref Range    WBC 12.62 (H) 3.40 - 10.80 10*3/mm3    RBC 4.84 3.77 - 5.28 10*6/mm3    Hemoglobin 11.5 (L) 12.0 - 15.9 g/dL    Hematocrit 37.6 34.0 - 46.6 %    MCV 77.7 (L) 79.0 - 97.0 fL    MCH 23.8 (L) 26.6 - 33.0 pg    MCHC 30.6 (L) 31.5 - 35.7 g/dL    RDW 16.5 (H) 12.3 - 15.4 %    RDW-SD 46.9 37.0 - 54.0 fl    MPV 10.8 6.0 - 12.0 fL    Platelets 285 140 - 450 10*3/mm3    Neutrophil % 68.9 42.7 - 76.0 %    Lymphocyte % 22.7 19.6 - 45.3 %    Monocyte % 6.6 5.0 - 12.0 %    Eosinophil % 1.1 0.3 - 6.2 %    Basophil % 0.4 0.0 - 1.5 %    Immature Grans % 0.3 0.0 - 0.5 %    Neutrophils, Absolute 8.70 (H) 1.70 - 7.00 10*3/mm3    Lymphocytes, Absolute 2.86 0.70 - 3.10 10*3/mm3    Monocytes, Absolute 0.83 0.10 - 0.90 10*3/mm3    Eosinophils, Absolute 0.14 0.00 - 0.40 10*3/mm3    Basophils, Absolute 0.05 0.00 - 0.20 10*3/mm3    Immature Grans, Absolute 0.04 0.00 - 0.05 10*3/mm3    nRBC 0.0 0.0 - 0.2 /100 WBC   Urinalysis With Microscopic If Indicated (No Culture) - Urine, Clean Catch    Collection Time: 07/21/23  7:26 AM    Specimen: Urine, Clean Catch   Result Value Ref Range    Color, UA Yellow Yellow, Straw    Appearance, UA Cloudy (A) Clear    pH, UA 8.0 5.0 - 8.0    Specific Gravity, UA 1.022 1.001 - 1.030    Glucose, UA Negative Negative    Ketones, UA Trace (A) Negative    Bilirubin, UA Negative Negative    Blood, UA Large (3+) (A) Negative    Protein, UA 30 mg/dL (1+) (A) Negative    Leuk  "Esterase, UA Trace (A) Negative    Nitrite, UA Negative Negative    Urobilinogen, UA 1.0 E.U./dL 0.2 - 1.0 E.U./dL   Urinalysis, Microscopic Only - Urine, Clean Catch    Collection Time: 07/21/23  7:26 AM    Specimen: Urine, Clean Catch   Result Value Ref Range    RBC, UA Too Numerous to Count (A) None Seen, 0-2 /HPF    WBC, UA 6-12 (A) None Seen, 0-2 /HPF    Bacteria, UA Trace None Seen, Trace /HPF    Squamous Epithelial Cells, UA 13-20 (A) None Seen, 0-2 /HPF    Transitional Epithelial Cells, UA 0-2 0 - 2 /HPF    Renal Epithelial Cells, UA 0-2 0 - 2 /HPF    Hyaline Casts, UA None Seen 0 - 6 /LPF    Methodology Manual Light Microscopy    POC Urine Pregnancy    Collection Time: 07/21/23  7:30 AM    Specimen: Urine   Result Value Ref Range    HCG, Urine, QL Negative Negative    Lot Number [id number]     Internal Positive Control Positive Positive, Passed    Internal Negative Control Negative Negative, Passed    Expiration Date 01/03/2025      Note: In addition to lab results from this visit, the labs listed above may include labs taken at another facility or during a different encounter within the last 24 hours. Please correlate lab times with ED admission and discharge times for further clarification of the services performed during this visit.    No orders to display     Vitals:    07/21/23 0952 07/21/23 1000 07/21/23 1030 07/21/23 1034   BP: 136/86 127/82 119/69    BP Location: Right arm      Patient Position: Sitting      Pulse: 117 95 85 96   Resp: 18      Temp: 98 °F (36.7 °C)      TempSrc: Oral      SpO2: 98% 98%  94%   Weight: 102 kg (225 lb)      Height: 177.8 cm (70\")        Medications   sodium chloride 0.9 % bolus 500 mL (0 mL Intravenous Stopped 7/21/23 1046)   droperidol (INAPSINE) injection 2.5 mg (2.5 mg Intravenous Given 7/21/23 1012)     ECG/EMG Results (last 24 hours)       ** No results found for the last 24 hours. **          No orders to display                            CELINA reviewed by Garcia, " Alexander Arreaga MD       Medical Decision Making  Problems Addressed:  Left flank pain: complicated acute illness or injury  Nausea and vomiting, unspecified vomiting type: complicated acute illness or injury    Risk  Prescription drug management.        Final diagnoses:   Left flank pain   Nausea and vomiting, unspecified vomiting type       ED Disposition  ED Disposition       ED Disposition   Discharge    Condition   Stable    Comment   --               Dionicio Jackson MD  [address] [address]  [phone]    In 1 week           Medication List      No changes were made to your prescriptions during this visit.            Alexander Zelaya MD  07/21/23 3814

## 2023-07-21 NOTE — ED PROVIDER NOTES
Subjective   History of Present Illness  27-year-old female with a prior history of kidney stones presents for evaluation of left flank pain and nausea/vomiting.  The patient states that her symptoms started last night and have persisted throughout the night.  Last night she went to the emergency department at [facility].  She reports that she had a CT scan that revealed a left-sided kidney stone.  She was discharged home but notes that she has had persistent pain as well as nausea and vomiting, prompting her visit here this morning for second opinion.  She currently rates her pain at 10 out of 10 in severity.  She tells me that she has a prior history of kidney stones and that her current symptoms feel identical.  No aggravating or alleviating factors.  No fevers.    Review of Systems   Gastrointestinal:  Positive for nausea and vomiting.   Genitourinary:  Positive for flank pain.   All other systems reviewed and are negative.    Past Medical History:   Diagnosis Date    Abnormal Pap smear of cervix     HPV    Anemia     Anxiety     Depression     Kidney stones     last one     Migraines     SVT (supraventricular tachycardia) 2008    ablasion at age 13       No Known Allergies    Past Surgical History:   Procedure Laterality Date    CARDIAC ABLATION      Hx SVT     SECTION N/A 2018    Procedure:  SECTION PRIMARY;  Surgeon: Caitlin Martinez MD;  Location: [facility] LABOR DELIVERY;  Service: Obstetrics    KIDNEY STONE SURGERY         Family History   Problem Relation Age of Onset    Diabetes Mother     Hypertension Mother     Mental illness Mother     Migraines Mother     Thyroid disease Mother     Depression Mother     Anxiety disorder Mother     Diabetes Father     Hyperlipidemia Father     Mental illness Father     Asthma Brother     Heart attack Maternal Grandfather        Social History     Socioeconomic History    Marital status:    Tobacco Use    Smoking status:  Never    Smokeless tobacco: Never   Vaping Use    Vaping Use: Never used   Substance and Sexual Activity    Alcohol use: No    Drug use: No    Sexual activity: Yes     Partners: Male           Objective   Physical Exam  Vitals and nursing note reviewed.   Constitutional:       Appearance: She is well-developed. She is not diaphoretic.   HENT:      Head: Normocephalic and atraumatic.   Cardiovascular:      Rate and Rhythm: Normal rate and regular rhythm. Tachycardia present.      Heart sounds: Normal heart sounds. No murmur heard.    No friction rub. No gallop.   Pulmonary:      Effort: Pulmonary effort is normal. No respiratory distress.      Breath sounds: Normal breath sounds. No wheezing or rales.   Abdominal:      General: Bowel sounds are normal. There is no distension.      Palpations: Abdomen is soft. There is no mass.      Tenderness: There is no abdominal tenderness. There is no guarding or rebound.      Comments: No focal abdominal tenderness, no peritoneal signs, no pain out of proportion to exam   Genitourinary:     Comments: No CVA tenderness noted  Musculoskeletal:         General: Normal range of motion.      Cervical back: Neck supple.   Skin:     General: Skin is warm and dry.      Findings: No erythema or rash.      Comments: No dermatomal rash present   Neurological:      Mental Status: She is alert and oriented to person, place, and time.   Psychiatric:         Thought Content: Thought content normal.         Judgment: Judgment normal.      Comments: Appears anxious       Procedures           ED Course  ED Course as of 07/21/23 1237   Fri Jul 21, 2023   0622 27-year-old female with a prior history of kidney stones presents for evaluation of left flank pain and nausea/vomiting.  She states that her symptoms started last night and have persisted throughout the night.  Last night she went to the emergency department at [facility] where she had a CT scan that revealed a reported left-sided  kidney stone.  She was discharged home but notes that she has had persistent pain, prompting her visit here for a second opinion this morning.  On arrival, the patient is uncomfortable appearing.  Nonsurgical abdomen.  No CVA tenderness noted.  No dermatomal rash present.  I performed a bedside ultrasound which revealed no evidence of left-sided hydronephrosis.  We were able to obtain the patient's CT report from earlier this morning which revealed a 2 mm left-sided stone without any evidence of hydronephrosis. [DD]   0646 Labs are unrevealing.  Upon reevaluation, the patient looks and feels markedly improved. [DD]   0646 Her heart rate is now in the 80s. [DD]   0741 Patient reassured.  I feel that she can be managed on an outpatient basis at this point.  Prescription for Zofran and short course of Percocet.  I have referred her to Dr. Jackson of urology, and she will follow-up within the next week as needed.  Agreeable with plan and given appropriate strict return precautions. [DD]      ED Course User Index  [DD] Alexander Zelaya MD                                 Recent Results (from the past 24 hour(s))   Pregnancy, Urine -    Collection Time: 07/20/23 11:49 PM    Specimen: Urine   Result Value Ref Range    HCG, Urine QL Negative Negative, Inconclusive   Comprehensive Metabolic Panel    Collection Time: 07/21/23  5:51 AM    Specimen: Blood   Result Value Ref Range    Glucose 126 (H) 65 - 99 mg/dL    BUN 11 6 - 20 mg/dL    Creatinine 1.07 (H) 0.57 - 1.00 mg/dL    Sodium 140 136 - 145 mmol/L    Potassium 4.2 3.5 - 5.2 mmol/L    Chloride 106 98 - 107 mmol/L    CO2 20.0 (L) 22.0 - 29.0 mmol/L    Calcium 9.0 8.6 - 10.5 mg/dL    Total Protein 7.0 6.0 - 8.5 g/dL    Albumin 4.3 3.5 - 5.2 g/dL    ALT (SGPT) 16 1 - 33 U/L    AST (SGOT) 21 1 - 32 U/L    Alkaline Phosphatase 87 39 - 117 U/L    Total Bilirubin 0.2 0.0 - 1.2 mg/dL    Globulin 2.7 gm/dL    A/G Ratio 1.6 g/dL    BUN/Creatinine Ratio 10.3 7.0 - 25.0    Anion  Gap 14.0 5.0 - 15.0 mmol/L    eGFR 72.3 >60.0 mL/min/1.73   Lipase    Collection Time: 07/21/23  5:51 AM    Specimen: Blood   Result Value Ref Range    Lipase 28 13 - 60 U/L   Green Top (Gel)    Collection Time: 07/21/23  5:51 AM   Result Value Ref Range    Extra Tube Hold for add-ons.    Lavender Top    Collection Time: 07/21/23  5:51 AM   Result Value Ref Range    Extra Tube hold for add-on    Gray Top    Collection Time: 07/21/23  5:51 AM   Result Value Ref Range    Extra Tube Hold for add-ons.    Light Blue Top    Collection Time: 07/21/23  5:51 AM   Result Value Ref Range    Extra Tube Hold for add-ons.    CBC Auto Differential    Collection Time: 07/21/23  5:51 AM    Specimen: Blood   Result Value Ref Range    WBC 12.62 (H) 3.40 - 10.80 10*3/mm3    RBC 4.84 3.77 - 5.28 10*6/mm3    Hemoglobin 11.5 (L) 12.0 - 15.9 g/dL    Hematocrit 37.6 34.0 - 46.6 %    MCV 77.7 (L) 79.0 - 97.0 fL    MCH 23.8 (L) 26.6 - 33.0 pg    MCHC 30.6 (L) 31.5 - 35.7 g/dL    RDW 16.5 (H) 12.3 - 15.4 %    RDW-SD 46.9 37.0 - 54.0 fl    MPV 10.8 6.0 - 12.0 fL    Platelets 285 140 - 450 10*3/mm3    Neutrophil % 68.9 42.7 - 76.0 %    Lymphocyte % 22.7 19.6 - 45.3 %    Monocyte % 6.6 5.0 - 12.0 %    Eosinophil % 1.1 0.3 - 6.2 %    Basophil % 0.4 0.0 - 1.5 %    Immature Grans % 0.3 0.0 - 0.5 %    Neutrophils, Absolute 8.70 (H) 1.70 - 7.00 10*3/mm3    Lymphocytes, Absolute 2.86 0.70 - 3.10 10*3/mm3    Monocytes, Absolute 0.83 0.10 - 0.90 10*3/mm3    Eosinophils, Absolute 0.14 0.00 - 0.40 10*3/mm3    Basophils, Absolute 0.05 0.00 - 0.20 10*3/mm3    Immature Grans, Absolute 0.04 0.00 - 0.05 10*3/mm3    nRBC 0.0 0.0 - 0.2 /100 WBC   Urinalysis With Microscopic If Indicated (No Culture) - Urine, Clean Catch    Collection Time: 07/21/23  7:26 AM    Specimen: Urine, Clean Catch   Result Value Ref Range    Color, UA Yellow Yellow, Straw    Appearance, UA Cloudy (A) Clear    pH, UA 8.0 5.0 - 8.0    Specific Gravity, UA 1.022 1.001 - 1.030    Glucose, UA  "Negative Negative    Ketones, UA Trace (A) Negative    Bilirubin, UA Negative Negative    Blood, UA Large (3+) (A) Negative    Protein, UA 30 mg/dL (1+) (A) Negative    Leuk Esterase, UA Trace (A) Negative    Nitrite, UA Negative Negative    Urobilinogen, UA 1.0 E.U./dL 0.2 - 1.0 E.U./dL   Urinalysis, Microscopic Only - Urine, Clean Catch    Collection Time: 07/21/23  7:26 AM    Specimen: Urine, Clean Catch   Result Value Ref Range    RBC, UA Too Numerous to Count (A) None Seen, 0-2 /HPF    WBC, UA 6-12 (A) None Seen, 0-2 /HPF    Bacteria, UA Trace None Seen, Trace /HPF    Squamous Epithelial Cells, UA 13-20 (A) None Seen, 0-2 /HPF    Transitional Epithelial Cells, UA 0-2 0 - 2 /HPF    Renal Epithelial Cells, UA 0-2 0 - 2 /HPF    Hyaline Casts, UA None Seen 0 - 6 /LPF    Methodology Manual Light Microscopy    POC Urine Pregnancy    Collection Time: 07/21/23  7:30 AM    Specimen: Urine   Result Value Ref Range    HCG, Urine, QL Negative Negative    Lot Number [id number]     Internal Positive Control Positive Positive, Passed    Internal Negative Control Negative Negative, Passed    Expiration Date 01/03/2025      Note: In addition to lab results from this visit, the labs listed above may include labs taken at another facility or during a different encounter within the last 24 hours. Please correlate lab times with ED admission and discharge times for further clarification of the services performed during this visit.    No orders to display     Vitals:    07/21/23 0547 07/21/23 0558 07/21/23 0710   BP: (!) 132/119 126/85    BP Location: Right arm     Patient Position: Lying     Pulse: 112 118 80   Resp: 18     Temp: 99 °F (37.2 °C)     TempSrc: Oral     SpO2: 96% 98% 95%   Weight: 102 kg (225 lb)     Height: 177.8 cm (70\")       Medications   sodium chloride 0.9 % bolus 1,000 mL (0 mL Intravenous Stopped 7/21/23 0800)   HYDROmorphone (DILAUDID) injection 0.5 mg (0.5 mg Intravenous Given 7/21/23 0608)   ondansetron " (ZOFRAN) injection 4 mg (4 mg Intravenous Given 7/21/23 0608)   ketorolac (TORADOL) injection 15 mg (15 mg Intravenous Given 7/21/23 0608)   ketorolac (TORADOL) injection 15 mg (15 mg Intravenous Given 7/21/23 0753)   HYDROcodone-acetaminophen (NORCO) 5-325 MG per tablet 1 tablet (1 tablet Oral Given 7/21/23 0752)     ECG/EMG Results (last 24 hours)       ** No results found for the last 24 hours. **          No orders to display                 Medical Decision Making  Problems Addressed:  Kidney stone on left side: complicated acute illness or injury  Leukocytosis, unspecified type: complicated acute illness or injury    Amount and/or Complexity of Data Reviewed  Labs: ordered.    Risk  Prescription drug management.        Final diagnoses:   Kidney stone on left side   Leukocytosis, unspecified type       ED Disposition  ED Disposition       ED Disposition   Discharge    Condition   Stable    Comment   --               Dionicio Jackson MD  [address] [address] [phone]    In 1 week           Medication List        New Prescriptions      ondansetron 4 MG tablet  Commonly known as: ZOFRAN  Take 1 tablet by mouth Every 8 (Eight) Hours As Needed for Nausea.     oxyCODONE-acetaminophen 5-325 MG per tablet  Commonly known as: PERCOCET  Take 1 tablet by mouth Every 8 (Eight) Hours As Needed for Severe Pain.               Where to Get Your Medications        These medications were sent to [id number] - [place], [place] - [facility] [address] [facility] AT [facility] & [facility] [phone] [place] [phone] [address] [address]      Phone: [phone]   ondansetron 4 MG tablet  oxyCODONE-acetaminophen 5-325 MG per tablet            Alexander Zelaya MD  07/21/23 8918

## 2023-09-26 ENCOUNTER — LAB (OUTPATIENT)
Dept: LAB | Facility: HOSPITAL | Age: 28
End: 2023-09-26
Payer: COMMERCIAL

## 2023-09-26 ENCOUNTER — TELEPHONE (OUTPATIENT)
Dept: OBSTETRICS AND GYNECOLOGY | Facility: CLINIC | Age: 28
End: 2023-09-26
Payer: COMMERCIAL

## 2023-09-26 DIAGNOSIS — Z32.00 UNCONFIRMED PREGNANCY: Primary | ICD-10-CM

## 2023-09-26 PROCEDURE — 36415 COLL VENOUS BLD VENIPUNCTURE: CPT | Performed by: OBSTETRICS & GYNECOLOGY

## 2023-09-26 PROCEDURE — 84702 CHORIONIC GONADOTROPIN TEST: CPT

## 2023-09-26 PROCEDURE — 84144 ASSAY OF PROGESTERONE: CPT

## 2023-09-26 NOTE — TELEPHONE ENCOUNTER
New OB appt 10/9/2023. LMP Approx 8/16/23, Approx 5w6d. Vaginal spotting (1-2 small drops) light pink in panty liner. Mid pelvic cramping since beginning for pregnancy. Pain rating 1-2/10. Denies recent IC, heavy lifting. Early miscarriage March 2023. Hcg was negative 3/6 at [facility]. Was told it was a chemical pregnancy. Blood type O+. Ectopic precautions reviewed with patient. She will come in today for beta hcg and progesterone level. Will repeat beta Thursday. Will come in for US if bleeding continues, pain increases, or becomes greater on one side. Orders in EPIC.

## 2023-09-26 NOTE — TELEPHONE ENCOUNTER
Provider: DR. DOW     Caller: Soni Zarate     Relationship to Patient: SELF    Pharmacy: NA    Phone Number: [phone]     Reason for Call: NOB SCHEDULED 10/09 LMP BELIEVES TO  BE 08/16, STARTED EXPERIENCING  MILD/ LIGHT COLORED VAGINAL SPOTTING ABOUT AN HOUR AGO     When was the patient last seen: NEW     When did it start: AN HOUR AGO    Where is it located: VAGINAL SPOTTING    Characteristics of symptom/severity: NA    Timing- Is it constant or intermittent: NA    What makes it worse: NA    What makes it better: NA    What therapies/medications have you tried: AN    PLEASE ADVISE, OK TO CALLBACK ANYTIME, OK TO LEAVE A VM

## 2023-09-27 LAB — PROGEST SERPL-MCNC: 16.1 NG/ML

## 2023-09-28 ENCOUNTER — LAB (OUTPATIENT)
Dept: LAB | Facility: HOSPITAL | Age: 28
End: 2023-09-28
Payer: COMMERCIAL

## 2023-09-28 ENCOUNTER — TELEPHONE (OUTPATIENT)
Dept: OBSTETRICS AND GYNECOLOGY | Facility: CLINIC | Age: 28
End: 2023-09-28
Payer: COMMERCIAL

## 2023-09-28 DIAGNOSIS — Z32.00 UNCONFIRMED PREGNANCY: Primary | ICD-10-CM

## 2023-09-28 LAB — HCG INTACT+B SERPL-ACNC: NORMAL MIU/ML

## 2023-09-28 NOTE — TELEPHONE ENCOUNTER
Returned patient's call. States she has had no more spotting and continues to have same mild cramping. Informed her HCG = 27,628. N. WILEY Chester's note said to repeat HCG today. Patient v/u and will come in today for HCG.

## 2023-09-29 ENCOUNTER — OFFICE VISIT (OUTPATIENT)
Dept: OBSTETRICS AND GYNECOLOGY | Facility: CLINIC | Age: 28
End: 2023-09-29
Payer: COMMERCIAL

## 2023-09-29 VITALS
SYSTOLIC BLOOD PRESSURE: 116 MMHG | DIASTOLIC BLOOD PRESSURE: 60 MMHG | HEIGHT: 70 IN | BODY MASS INDEX: 31.78 KG/M2 | WEIGHT: 222 LBS

## 2023-09-29 DIAGNOSIS — N76.0 ACUTE VAGINITIS: ICD-10-CM

## 2023-09-29 DIAGNOSIS — O46.90 VAGINAL BLEEDING DURING PREGNANCY: Primary | ICD-10-CM

## 2023-09-29 LAB — HCG INTACT+B SERPL-ACNC: NORMAL MIU/ML

## 2023-09-29 RX ORDER — SUMATRIPTAN 100 MG/1
TABLET, FILM COATED ORAL
COMMUNITY
Start: 2023-06-28 | End: 2023-09-29

## 2023-09-29 NOTE — PROGRESS NOTES
HCG 9/26/23 27,628. HCG 9/28/2023 37,545. LVM for pt to call back. She should come in today for US. If pt calls back please schedule for US and appt today. Advise to go to ED for severe pain and/or bleeding.     Patient called 9/26/2023 c/o vaginal spotting (1-2 small drops) light pink in panty liner. Mid pelvic cramping since beginning for pregnancy. Pain rating 1-2/10. Denies recent IC, heavy lifting. Early miscarriage March 2023. Hcg was negative 3/6 at [facility]. Was told it was a chemical pregnancy. Blood type O+. Ectopic precautions reviewed with patient. Patient advised to come in for beta hcg and progesterone level. Repeat beta Thursday. Instructed to come in for US if bleeding continues, pain increases, or becomes greater on one side.

## 2023-09-29 NOTE — PROGRESS NOTES
Chief Complaint   Patient presents with    Elective      TAB          HPI  Soni Zarate is a 27 y.o. female, , who presents with bleeding over the past 3 days. The bleeding started as spotting Tuesday. Wednesday no bleeding. This AM bleeding was very heavy. Patient denies seeing any clots. She states the bleeding was not vaginal today but was coming from her rectum. 99% sure. She has a hx of hemorrhoids but has never experienced blood like this. She complains of cramping pain. The pain is located in her mid lower abdomen w/ occasional sharp pain in her right side. Patient has had a miscarriage before and was concerned about this bleeding.     Recent Tests:  She had a urine pregnancy test that was done 7 days ago that was positive.    US today: Yes.  Findings showed 6w1d, MARCY 24, CRL 4.6mm/5%. LMP GA 6w2d, MARCY 24.  She has not had prenatal care. Her past medical history is notable for previous miscarriage . She does not have passage of tissue.  Rh Status:  O positive  She reports no additional symptoms or complaints.    The additional following portions of the patient's history were reviewed and updated as appropriate: allergies, current medications, past family history, past medical history, past social history, past surgical history, and problem list.    Review of Systems   Respiratory: Negative.  Negative for shortness of breath.    Cardiovascular: Negative.  Negative for chest pain.   Gastrointestinal:  Positive for constipation. Negative for abdominal distention and abdominal pain.        Hemorrhoids    Genitourinary:  Positive for pelvic pain (cramping in mid lower abdomen. Intermittent rare sharp right pelvic pain.) and vaginal bleeding (None since 2023). Negative for dysuria, pelvic pressure, vaginal discharge and vaginal pain.   All other systems reviewed and are negative.  All other systems reviewed and are negative.     I have reviewed and agree with the HPI, ROS,  "and historical information as entered above. Melany Chester, APRN      Objective   /60   Ht 177.8 cm (70\")   Wt 101 kg (222 lb)   LMP 2023 (Exact Date)   BMI 31.85 kg/m²     Physical Exam  Vitals and nursing note reviewed. Exam conducted with a chaperone present.   Constitutional:       General: She is not in acute distress.     Appearance: Normal appearance. She is not ill-appearing or toxic-appearing.   HENT:      Head: Normocephalic and atraumatic.   Pulmonary:      Effort: Pulmonary effort is normal.   Abdominal:      Palpations: Abdomen is soft. There is no mass.      Tenderness: There is no abdominal tenderness.      Hernia: No hernia is present.   Genitourinary:     General: Normal vulva.      Labia:         Right: No rash, tenderness, lesion or injury.         Left: No rash, tenderness, lesion or injury.       Vagina: No signs of injury and foreign body. No vaginal discharge, erythema, tenderness, bleeding or lesions.      Cervix: No cervical motion tenderness, discharge, friability, lesion, erythema or cervical bleeding.      Uterus: Normal. Not enlarged and not tender.       Adnexa: Right adnexa normal.        Right: No tenderness.          Left: No tenderness.     Neurological:      Mental Status: She is alert and oriented to person, place, and time.   Psychiatric:         Mood and Affect: Mood normal.         Behavior: Behavior normal.        Assessment and Plan    Problem List Items Addressed This Visit    None  Visit Diagnoses       Vaginal bleeding during pregnancy    -  Primary    Relevant Orders    HCG, B-subunit, Quantitative    Acute vaginitis        Relevant Orders    NuSwab VG, Candida 6sp - Swab, Cervix, Endocervix          Threatened AB; Consulted w/ OP  Rh+  HC23- 27,628 & 23- 37,545.   US showed IUP w/ cardiac activity. Unable to trace due to early gestation. Also, complex area noted adjacent to right ovary 15.6x14.6x10.2mm. Adnexal vs ovarian. Cannot r/o " ectopic   Patient informed of potential for miscarriage or ectopic due to hcgs and US findings. Rare but chance of IUP & ectopic. Ectopic precautions reviewed. Advised to repeat U/S in 1 week(s). Patient anxious about waiting. Patient will return Monday for beta hcg.   Pelvic Rest.  No douching, intercourse or use of tampons.  Tylenol sparingly PRN mild pelvic cramping.  Call for an increase in bleeding, abdominal pain, or fever.  Report to ED if saturating a pad greater than every 30-60 mins.  Return in about 1 week (around 10/6/2023) for F/U TAB w/ US.    Counseling was given to patient and family for the following topics: importance of treatment compliance . Total time of the encounter was 30 minutes and 30 minutes was spend counseling.      Melany Chester, APRN  09/29/2023    No

## 2023-09-29 NOTE — PROGRESS NOTES
Patient notified. Advised to repeat hcg 9/28/2023. Come in for US if bleeding continues or worsens or if pain increases.

## 2023-10-02 ENCOUNTER — LAB (OUTPATIENT)
Dept: LAB | Facility: HOSPITAL | Age: 28
End: 2023-10-02
Payer: COMMERCIAL

## 2023-10-02 ENCOUNTER — TELEPHONE (OUTPATIENT)
Dept: OBSTETRICS AND GYNECOLOGY | Facility: CLINIC | Age: 28
End: 2023-10-02
Payer: COMMERCIAL

## 2023-10-02 DIAGNOSIS — O46.90 VAGINAL BLEEDING DURING PREGNANCY: Primary | ICD-10-CM

## 2023-10-02 LAB — HCG INTACT+B SERPL-ACNC: NORMAL MIU/ML

## 2023-10-02 PROCEDURE — 36415 COLL VENOUS BLD VENIPUNCTURE: CPT

## 2023-10-02 PROCEDURE — 84702 CHORIONIC GONADOTROPIN TEST: CPT

## 2023-10-02 NOTE — TELEPHONE ENCOUNTER
Returned a call from patient. States when she saw THEO Chester, APRN 9/29/23, she was told to go to the lab today for HCG to be done stat so results will be available today. She is in the hospital lab but HCG needs to reordered to be stat. HCG reordered.  Patient v/u.

## 2023-10-02 NOTE — TELEPHONE ENCOUNTER
STATED THE ORDER FOR HCG DRAW IS THE WRONG ONE, STATED THE ONE IN THE COMPUTER CANNOT BE STAT ASKED TO CHANGE ORDER/ CALL TO CLARIFY

## 2023-10-03 LAB
A VAGINAE DNA VAG QL NAA+PROBE: NORMAL SCORE
BVAB2 DNA VAG QL NAA+PROBE: NORMAL SCORE
C ALBICANS DNA VAG QL NAA+PROBE: NEGATIVE
C GLABRATA DNA VAG QL NAA+PROBE: NEGATIVE
C KRUSEI DNA VAG QL NAA+PROBE: NEGATIVE
C LUSITANIAE DNA VAG QL NAA+PROBE: NEGATIVE
CANDIDA DNA VAG QL NAA+PROBE: NEGATIVE
MEGA1 DNA VAG QL NAA+PROBE: NORMAL SCORE
T VAGINALIS DNA VAG QL NAA+PROBE: NEGATIVE

## 2023-10-06 ENCOUNTER — INITIAL PRENATAL (OUTPATIENT)
Dept: OBSTETRICS AND GYNECOLOGY | Facility: CLINIC | Age: 28
End: 2023-10-06
Payer: COMMERCIAL

## 2023-10-06 VITALS — SYSTOLIC BLOOD PRESSURE: 120 MMHG | WEIGHT: 221 LBS | BODY MASS INDEX: 31.71 KG/M2 | DIASTOLIC BLOOD PRESSURE: 78 MMHG

## 2023-10-06 DIAGNOSIS — E66.9 OBESITY (BMI 30.0-34.9): ICD-10-CM

## 2023-10-06 DIAGNOSIS — G43.009 MIGRAINE WITHOUT AURA AND WITHOUT STATUS MIGRAINOSUS, NOT INTRACTABLE: ICD-10-CM

## 2023-10-06 DIAGNOSIS — Z34.91 FIRST TRIMESTER PREGNANCY: Primary | ICD-10-CM

## 2023-10-06 DIAGNOSIS — M51.26 LUMBAR DISC HERNIATION: ICD-10-CM

## 2023-10-06 DIAGNOSIS — F33.0 MILD EPISODE OF RECURRENT MAJOR DEPRESSIVE DISORDER: ICD-10-CM

## 2023-10-06 DIAGNOSIS — Z98.890 HISTORY OF CARDIAC RADIOFREQUENCY ABLATION: ICD-10-CM

## 2023-10-06 DIAGNOSIS — F41.0 GENERALIZED ANXIETY DISORDER WITH PANIC ATTACKS: ICD-10-CM

## 2023-10-06 DIAGNOSIS — F41.1 GENERALIZED ANXIETY DISORDER WITH PANIC ATTACKS: ICD-10-CM

## 2023-10-06 RX ORDER — PRENATAL VIT NO.126/IRON/FOLIC 28MG-0.8MG
TABLET ORAL DAILY
COMMUNITY
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
## 2023-10-07 LAB
ABO GROUP BLD: ABNORMAL
AMPHETAMINES UR QL SCN: NEGATIVE NG/ML
APPEARANCE UR: ABNORMAL
BACTERIA #/AREA URNS HPF: ABNORMAL /[HPF]
BARBITURATES UR QL SCN: NEGATIVE NG/ML
BASOPHILS # BLD AUTO: 0 X10E3/UL (ref 0–0.2)
BASOPHILS NFR BLD AUTO: 0 %
BENZODIAZ UR QL SCN: NEGATIVE NG/ML
BILIRUB UR QL STRIP: NEGATIVE
BLD GP AB SCN SERPL QL: NEGATIVE
BZE UR QL SCN: NEGATIVE NG/ML
CANNABINOIDS UR QL SCN: NEGATIVE NG/ML
CASTS URNS QL MICRO: ABNORMAL /LPF
COLOR UR: YELLOW
CREAT UR-MCNC: 213.3 MG/DL (ref 20–300)
CRYSTALS URNS MICRO: ABNORMAL
EOSINOPHIL # BLD AUTO: 0.2 X10E3/UL (ref 0–0.4)
EOSINOPHIL NFR BLD AUTO: 2 %
EPI CELLS #/AREA URNS HPF: ABNORMAL /HPF (ref 0–10)
ERYTHROCYTE [DISTWIDTH] IN BLOOD BY AUTOMATED COUNT: 14.6 % (ref 11.7–15.4)
GLUCOSE UR QL STRIP: NEGATIVE
HBA1C MFR BLD: 5.8 % (ref 4.8–5.6)
HBV SURFACE AG SERPL QL IA: NEGATIVE
HCT VFR BLD AUTO: 36.6 % (ref 34–46.6)
HCV IGG SERPL QL IA: NON REACTIVE
HGB BLD-MCNC: 11.3 G/DL (ref 11.1–15.9)
HGB UR QL STRIP: NEGATIVE
HIV 1+2 AB+HIV1 P24 AG SERPL QL IA: NON REACTIVE
IMM GRANULOCYTES # BLD AUTO: 0 X10E3/UL (ref 0–0.1)
IMM GRANULOCYTES NFR BLD AUTO: 0 %
KETONES UR QL STRIP: NEGATIVE
LABORATORY COMMENT REPORT: NORMAL
LEUKOCYTE ESTERASE UR QL STRIP: NEGATIVE
LYMPHOCYTES # BLD AUTO: 1.9 X10E3/UL (ref 0.7–3.1)
LYMPHOCYTES NFR BLD AUTO: 19 %
MCH RBC QN AUTO: 24.5 PG (ref 26.6–33)
MCHC RBC AUTO-ENTMCNC: 30.9 G/DL (ref 31.5–35.7)
MCV RBC AUTO: 79 FL (ref 79–97)
METHADONE UR QL SCN: NEGATIVE NG/ML
MICRO URNS: ABNORMAL
MICRO URNS: ABNORMAL
MONOCYTES # BLD AUTO: 0.5 X10E3/UL (ref 0.1–0.9)
MONOCYTES NFR BLD AUTO: 5 %
MUCOUS THREADS URNS QL MICRO: PRESENT
NEUTROPHILS # BLD AUTO: 7.7 X10E3/UL (ref 1.4–7)
NEUTROPHILS NFR BLD AUTO: 74 %
NITRITE UR QL STRIP: NEGATIVE
OPIATES UR QL SCN: NEGATIVE NG/ML
OXYCODONE+OXYMORPHONE UR QL SCN: NEGATIVE NG/ML
PCP UR QL: NEGATIVE NG/ML
PH UR STRIP: 6 [PH] (ref 5–7.5)
PH UR: 5.6 [PH] (ref 4.5–8.9)
PLATELET # BLD AUTO: 274 X10E3/UL (ref 150–450)
PROPOXYPH UR QL SCN: NEGATIVE NG/ML
PROT UR QL STRIP: ABNORMAL
RBC # BLD AUTO: 4.61 X10E6/UL (ref 3.77–5.28)
RBC #/AREA URNS HPF: ABNORMAL /HPF (ref 0–2)
RH BLD: POSITIVE
RPR SER QL: NON REACTIVE
RUBV IGG SERPL IA-ACNC: 3.89 INDEX
SP GR UR STRIP: 1.02 (ref 1–1.03)
UNIDENT CRYS URNS QL MICRO: PRESENT
UROBILINOGEN UR STRIP-MCNC: 0.2 MG/DL (ref 0.2–1)
WBC # BLD AUTO: 10.4 X10E3/UL (ref 3.4–10.8)
WBC #/AREA URNS HPF: ABNORMAL /HPF (ref 0–5)

## 2023-10-08 PROBLEM — E66.9 OBESITY (BMI 35.0-39.9 WITHOUT COMORBIDITY): Status: RESOLVED | Noted: 2023-05-31 | Resolved: 2023-10-08

## 2023-10-08 LAB
BACTERIA UR CULT: NORMAL
BACTERIA UR CULT: NORMAL

## 2023-10-20 ENCOUNTER — LAB (OUTPATIENT)
Dept: OBSTETRICS AND GYNECOLOGY | Facility: CLINIC | Age: 28
End: 2023-10-20
Payer: COMMERCIAL

## 2023-10-20 ENCOUNTER — TELEPHONE (OUTPATIENT)
Dept: OBSTETRICS AND GYNECOLOGY | Facility: CLINIC | Age: 28
End: 2023-10-20
Payer: COMMERCIAL

## 2023-10-20 DIAGNOSIS — Z98.890 HISTORY OF CARDIAC RADIOFREQUENCY ABLATION: Primary | ICD-10-CM

## 2023-10-20 NOTE — TELEPHONE ENCOUNTER
Spoke to patient, she reports that HR has been averaging 110-120, palpitations, and SOA. She has previously seen a cardiologist at  5 years ago, she is requesting to see cardiologist at [facility]. She denies chest pain or any other symptoms.     WILEY Lemons placed a referral for cardiology. Patient advised.

## 2023-10-20 NOTE — TELEPHONE ENCOUNTER
PT IN OFFICE TODAY FOR BLOOD WORK ONLY  PT STATES SPIKES IN HEART RATE RECENTLY FROM HER SVT  PT REQUESTING A REFERRAL TO CARDIOLOGY

## 2023-10-27 ENCOUNTER — TELEPHONE (OUTPATIENT)
Dept: CARDIOLOGY | Facility: HOSPITAL | Age: 28
End: 2023-10-27
Payer: COMMERCIAL

## 2023-10-27 DIAGNOSIS — G43.009 MIGRAINE WITHOUT AURA AND WITHOUT STATUS MIGRAINOSUS, NOT INTRACTABLE: ICD-10-CM

## 2023-10-27 RX ORDER — SUMATRIPTAN 100 MG/1
TABLET, FILM COATED ORAL
Qty: 9 TABLET | Refills: 5 | Status: SHIPPED | OUTPATIENT
Start: 2023-10-27

## 2023-10-27 NOTE — TELEPHONE ENCOUNTER
[facility] was referred [facility] by WILEY Lemons. Several attempts have been made to contact the patient with no success. Letter was mailed to patient  to contact the office to schedule.

## 2023-10-30 ENCOUNTER — HOSPITAL ENCOUNTER (OUTPATIENT)
Dept: CARDIOLOGY | Facility: HOSPITAL | Age: 28
Discharge: HOME | End: 2023-10-30
Payer: COMMERCIAL

## 2023-10-30 ENCOUNTER — OFFICE VISIT (OUTPATIENT)
Dept: CARDIOLOGY | Facility: HOSPITAL | Age: 28
End: 2023-10-30
Payer: COMMERCIAL

## 2023-10-30 VITALS
DIASTOLIC BLOOD PRESSURE: 76 MMHG | WEIGHT: 222.25 LBS | HEART RATE: 100 BPM | BODY MASS INDEX: 31.82 KG/M2 | OXYGEN SATURATION: 100 % | SYSTOLIC BLOOD PRESSURE: 125 MMHG | TEMPERATURE: 98 F | RESPIRATION RATE: 20 BRPM | HEIGHT: 70 IN

## 2023-10-30 DIAGNOSIS — R00.2 PALPITATIONS: Primary | ICD-10-CM

## 2023-10-30 DIAGNOSIS — R53.83 OTHER FATIGUE: ICD-10-CM

## 2023-10-30 DIAGNOSIS — R00.2 PALPITATIONS: ICD-10-CM

## 2023-10-30 DIAGNOSIS — I47.10 PAROXYSMAL SVT (SUPRAVENTRICULAR TACHYCARDIA): ICD-10-CM

## 2023-10-30 DIAGNOSIS — R06.09 DYSPNEA ON EXERTION: ICD-10-CM

## 2023-10-30 PROCEDURE — 93246 EXT ECG>7D<15D RECORDING: CPT

## 2023-10-30 PROCEDURE — 99214 OFFICE O/P EST MOD 30 MIN: CPT | Performed by: NURSE PRACTITIONER

## 2023-10-30 NOTE — PROGRESS NOTES
[facility] Heart Monitor Documentation    Soni Zarate  1995  [id number]  10/30/23      [] ZIO XT Patch  Model [id number] Prescribed for N/A Days    Serial Number: (N + 9 Digits) N   Apply-By Date on Box:   USPS Tracking Number:   USPS Tracking        [] [employer] MINI PLUS Mobile Cardiac Telemetry  Model BGMINIPLUS Prescribed for N/A Days    Serial Number: (BGM + 7 Digits) BGM  Shipped-By Date on Box:   UPS Tracking Number: [id number]  UPS Tracking      [] [employer] MINI Holter Monitor  Model BGMINIEL Prescribed for 14 Days    Serial Number: (7 Digits) [id number]  Shipped-By Date on Box: 757318  UPS Tracking Number: [id number]  UPS Tracking        This monitor was applied to the patient's chest and checked for proper functioning.  Ms. Soni Zarate was instructed in the proper use of this monitor.  She was given the opportunity to ask questions and left the office with the device 's instruction manual.    Nevin Gee MA, 15:43 EDT, 10/30/23                  [facility]

## 2023-10-30 NOTE — PROGRESS NOTES
"Chief Complaint  Establish Care, Rapid Heart Rate, and Shortness of Breath    Subjective      History of Present Illness {CC  Problem List  Visit  Diagnosis   Encounters  Notes  Medications  Labs  Result Review Imaging  Media :23}     Soni Zarate, 28 y.o. female with past medical history significant for SVT, anxiety, and depression, who presents to [facility] for Establish Care, Rapid Heart Rate, and Shortness of Breath.  Recently, patient has been having palpitations which are concerning to patient due to her history of SVT with previous ablation.  Patient is currently pregnant and is under the care of OB/GYN. She is currently 11 weeks gestation.     At time of current visit, patient denies chest pain/pressure, dizziness, lower extremity edema, and syncope. She does endorse shortness of air with even mild activity. Patient also endorses palpitations which she describes as a racing heart rate and fatigue. She is fairly active and is trying to stay active which has been limited due to tachycardia. Of note, patient underwent SVT ablation in approximately 2008 which she states they were unable to fully complete.     Caffeine intake is minimal  Water intake of 4-5 bottles of water daily  No nicotine/ETOH  No known family history of cardiac disease    Objective     Vital Signs:   Vitals:    10/30/23 1516 10/30/23 1518 10/30/23 1520   BP: 133/73 133/85 125/76   BP Location: Right arm Left arm Left arm   Patient Position: Sitting Standing Sitting   Cuff Size: Adult Adult Adult   Pulse: 102 114 100   Resp:   20   Temp:   98 °F (36.7 °C)   TempSrc:   Temporal   SpO2: 100% 98% 100%   Weight:   101 kg (222 lb 4 oz)   Height:   177.8 cm (70\")     Body mass index is 31.89 kg/m².  Physical Exam  Vitals and nursing note reviewed.   Constitutional:       Appearance: Normal appearance.   HENT:      Head: Normocephalic.   Eyes:      Pupils: Pupils are equal, round, and reactive to light. "   Cardiovascular:      Rate and Rhythm: Regular rhythm. Tachycardia present.      Pulses: Normal pulses.      Heart sounds: Normal heart sounds. No murmur heard.  Pulmonary:      Effort: Pulmonary effort is normal.      Breath sounds: Normal breath sounds.   Abdominal:      General: Bowel sounds are normal.      Palpations: Abdomen is soft.   Musculoskeletal:         General: Normal range of motion.      Cervical back: Normal range of motion.      Right lower leg: No edema.      Left lower leg: No edema.   Skin:     General: Skin is warm and dry.      Capillary Refill: Capillary refill takes less than 2 seconds.   Neurological:      Mental Status: She is alert and oriented to person, place, and time.   Psychiatric:         Mood and Affect: Mood normal.         Thought Content: Thought content normal.                Data Reviewed:{ Labs  Result Review  Imaging  Med Tab  Media :23}     Lab Results   Component Value Date    WBC 10.4 10/06/2023    HGB 11.3 10/06/2023    HCT 36.6 10/06/2023    MCV 79 10/06/2023     10/06/2023      Lab Results   Component Value Date    GLUCOSE 126 (H) 07/21/2023    BUN 11 07/21/2023    CREATININE 1.07 (H) 07/21/2023    EGFR 72.3 07/21/2023    BCR 10.3 07/21/2023    K 4.2 07/21/2023    CO2 20.0 (L) 07/21/2023    CALCIUM 9.0 07/21/2023    ALBUMIN 4.3 07/21/2023    BILITOT 0.2 07/21/2023    AST 21 07/21/2023    ALT 16 07/21/2023      ECG 12 Lead (12/01/2018 23:10)     Assessment & Plan   Assessment and Plan {CC Problem List  Visit Diagnosis  ROS  Review (Popup)  Health Maintenance  Quality  BestPractice  Medications  SmartSets  SnapShot Encounters  Media :23}     1. Palpitations  -Patient has history of SVT ablation, but states since recent pregnancy palpitations have been more frequent and are now impacting her every day life.   -She had previously been on propanolol  - Holter Monitor - 72 Hour Up To 15 Days; Future  - Adult Transthoracic Echo Complete W/ Cont if  Necessary Per Protocol; Future  -Will refer to cardiology for management of cardiac symptoms during pregnancy    2. Paroxysmal SVT (supraventricular tachycardia)  -Patient had ablation in approximately 2008 which was unable to be completed at that time  -She is now having palpitations that are impacting her daily activities  -Will obtain two week holter monitor to assess for SVT/PAC/PVC burden    3. Dyspnea on exertion  -Shortness of air worsening recently  -Will obtain echocardiogram to assess for any structural abnormalities which could be contributing to patient's symptoms    4. Fatigue  -Likely related to pregnancy, but will obtain 14 day holter monitor and echocardiogram to assess for structural abnormalities           Follow Up {Instructions Charge Capture  Follow-up Communications :23}     Return if symptoms worsen or fail to improve.      Patient was given instructions and counseling regarding her condition or for health maintenance advice. Please see specific information pulled into the AVS if appropriate.  Patient was instructed to call the [facility] with any questions, concerns, or worsening symptoms.    Dictated Utilizing Dragon Dictation   Please note that portions of this note were completed with a voice recognition program.   Part of this note may be an electronic transcription/translation of spoken language to printed text using the Dragon Dictation System.

## 2023-10-31 ENCOUNTER — TELEPHONE (OUTPATIENT)
Dept: OBSTETRICS AND GYNECOLOGY | Facility: CLINIC | Age: 28
End: 2023-10-31

## 2023-10-31 NOTE — TELEPHONE ENCOUNTER
Provider: DR DOW    Caller: ALEXI GARCIA    Phone Number: [phone]    Reason for Call: SAME DAY CANCELLATION//NOT FEELING WELL//RESCHEDULED

## 2023-10-31 NOTE — TELEPHONE ENCOUNTER
Attempted to return patient's call. Left voice message informing her that I was just calling to check on her since she canceled today's appointment because she isn't feeling well. Asked her to call us back if she needs anything.

## 2023-11-02 ENCOUNTER — HOSPITAL ENCOUNTER (OUTPATIENT)
Dept: CARDIOLOGY | Facility: HOSPITAL | Age: 28
Discharge: HOME | End: 2023-11-02
Payer: COMMERCIAL

## 2023-11-02 VITALS
HEIGHT: 70 IN | SYSTOLIC BLOOD PRESSURE: 122 MMHG | BODY MASS INDEX: 31.88 KG/M2 | WEIGHT: 222.66 LBS | DIASTOLIC BLOOD PRESSURE: 76 MMHG

## 2023-11-02 DIAGNOSIS — R00.2 PALPITATIONS: ICD-10-CM

## 2023-11-02 LAB
BH CV ECHO MEAS - AO MAX PG: 10.4 MMHG
BH CV ECHO MEAS - AO MEAN PG: 6 MMHG
BH CV ECHO MEAS - AO ROOT DIAM: 2.8 CM
BH CV ECHO MEAS - AO V2 MAX: 161.5 CM/SEC
BH CV ECHO MEAS - AO V2 VTI: 31.8 CM
BH CV ECHO MEAS - AVA(I,D): 2.09 CM2
BH CV ECHO MEAS - EDV(CUBED): 117.6 ML
BH CV ECHO MEAS - EDV(MOD-SP2): 113 ML
BH CV ECHO MEAS - EDV(MOD-SP4): 118 ML
BH CV ECHO MEAS - EF(MOD-BP): 58.5 %
BH CV ECHO MEAS - EF(MOD-SP2): 61.4 %
BH CV ECHO MEAS - EF(MOD-SP4): 55.2 %
BH CV ECHO MEAS - ESV(CUBED): 29.8 ML
BH CV ECHO MEAS - ESV(MOD-SP2): 43.6 ML
BH CV ECHO MEAS - ESV(MOD-SP4): 52.9 ML
BH CV ECHO MEAS - FS: 36.7 %
BH CV ECHO MEAS - IVS/LVPW: 1 CM
BH CV ECHO MEAS - IVSD: 1 CM
BH CV ECHO MEAS - LA DIMENSION: 2.5 CM
BH CV ECHO MEAS - LAT PEAK E' VEL: 18.7 CM/SEC
BH CV ECHO MEAS - LV DIASTOLIC VOL/BSA (35-75): 54.1 CM2
BH CV ECHO MEAS - LV MASS(C)D: 176 GRAMS
BH CV ECHO MEAS - LV MAX PG: 5.1 MMHG
BH CV ECHO MEAS - LV MEAN PG: 3 MMHG
BH CV ECHO MEAS - LV SYSTOLIC VOL/BSA (12-30): 24.2 CM2
BH CV ECHO MEAS - LV V1 MAX: 113 CM/SEC
BH CV ECHO MEAS - LV V1 VTI: 21.1 CM
BH CV ECHO MEAS - LVIDD: 4.9 CM
BH CV ECHO MEAS - LVIDS: 3.1 CM
BH CV ECHO MEAS - LVOT AREA: 3.1 CM2
BH CV ECHO MEAS - LVOT DIAM: 2 CM
BH CV ECHO MEAS - LVPWD: 1 CM
BH CV ECHO MEAS - MED PEAK E' VEL: 12.3 CM/SEC
BH CV ECHO MEAS - MV A MAX VEL: 95.4 CM/SEC
BH CV ECHO MEAS - MV DEC SLOPE: 777 CM/SEC2
BH CV ECHO MEAS - MV DEC TIME: 0.16 SEC
BH CV ECHO MEAS - MV E MAX VEL: 96.4 CM/SEC
BH CV ECHO MEAS - MV E/A: 1.01
BH CV ECHO MEAS - MV MAX PG: 7.4 MMHG
BH CV ECHO MEAS - MV MEAN PG: 3 MMHG
BH CV ECHO MEAS - MV P1/2T: 51.6 MSEC
BH CV ECHO MEAS - MV V2 VTI: 28.9 CM
BH CV ECHO MEAS - MVA(P1/2T): 4.3 CM2
BH CV ECHO MEAS - MVA(VTI): 2.29 CM2
BH CV ECHO MEAS - PA ACC TIME: 0.16 SEC
BH CV ECHO MEAS - PA V2 MAX: 112 CM/SEC
BH CV ECHO MEAS - RAP SYSTOLE: 3 MMHG
BH CV ECHO MEAS - RVSP: 25 MMHG
BH CV ECHO MEAS - SI(MOD-SP2): 31.8 ML/M2
BH CV ECHO MEAS - SI(MOD-SP4): 29.8 ML/M2
BH CV ECHO MEAS - SV(LVOT): 66.3 ML
BH CV ECHO MEAS - SV(MOD-SP2): 69.4 ML
BH CV ECHO MEAS - SV(MOD-SP4): 65.1 ML
BH CV ECHO MEAS - TAPSE (>1.6): 1.88 CM
BH CV ECHO MEAS - TR MAX PG: 22 MMHG
BH CV ECHO MEAS - TR MAX VEL: 201.5 CM/SEC
BH CV ECHO MEASUREMENTS AVERAGE E/E' RATIO: 6.22
BH CV XLRA - RV BASE: 3.4 CM
BH CV XLRA - RV LENGTH: 7.1 CM
BH CV XLRA - RV MID: 2.7 CM
BH CV XLRA - TDI S': 13.7 CM/SEC
LEFT ATRIUM VOLUME INDEX: 18.5 ML/M2

## 2023-11-02 PROCEDURE — 93306 TTE W/DOPPLER COMPLETE: CPT

## 2023-11-03 NOTE — PROGRESS NOTES
Your echocardiogram is normal.  Your heart contracts normally.  There are no significant valvular abnormalities noted.    If you have any questions regarding this, we can discuss in further detail at our next follow-up appointment.    Sincerely yours,        Ida JAMA

## 2023-11-06 ENCOUNTER — ROUTINE PRENATAL (OUTPATIENT)
Dept: OBSTETRICS AND GYNECOLOGY | Facility: CLINIC | Age: 28
End: 2023-11-06
Payer: COMMERCIAL

## 2023-11-06 VITALS — DIASTOLIC BLOOD PRESSURE: 70 MMHG | BODY MASS INDEX: 31.71 KG/M2 | SYSTOLIC BLOOD PRESSURE: 118 MMHG | WEIGHT: 221 LBS

## 2023-11-06 DIAGNOSIS — Z34.91 FIRST TRIMESTER PREGNANCY: Primary | ICD-10-CM

## 2023-11-06 DIAGNOSIS — G43.009 MIGRAINE WITHOUT AURA AND WITHOUT STATUS MIGRAINOSUS, NOT INTRACTABLE: ICD-10-CM

## 2023-11-06 DIAGNOSIS — E66.01 MORBID (SEVERE) OBESITY DUE TO EXCESS CALORIES: ICD-10-CM

## 2023-11-06 LAB
GLUCOSE UR STRIP-MCNC: NEGATIVE MG/DL
PROT UR STRIP-MCNC: NEGATIVE MG/DL

## 2023-11-06 RX ORDER — ONDANSETRON 4 MG/1
4 TABLET, FILM COATED ORAL EVERY 6 HOURS PRN
Qty: 30 TABLET | Refills: 1 | Status: SHIPPED | OUTPATIENT
Start: 2023-11-06 | End: 2024-02-04

## 2023-11-06 RX ORDER — PROMETHAZINE HYDROCHLORIDE 25 MG/1
25 TABLET ORAL EVERY 6 HOURS PRN
Qty: 30 TABLET | Refills: 0 | Status: SHIPPED | OUTPATIENT
Start: 2023-11-06

## 2023-11-06 RX ORDER — PROMETHAZINE HYDROCHLORIDE 25 MG/1
25 TABLET ORAL EVERY 6 HOURS PRN
Qty: 30 TABLET | Refills: 0 | Status: SHIPPED | OUTPATIENT
Start: 2023-11-06 | End: 2023-11-06 | Stop reason: SDUPTHER
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
## 2023-11-10 ENCOUNTER — ROUTINE PRENATAL (OUTPATIENT)
Dept: OBSTETRICS AND GYNECOLOGY | Facility: CLINIC | Age: 28
End: 2023-11-10
Payer: COMMERCIAL

## 2023-11-10 VITALS — DIASTOLIC BLOOD PRESSURE: 66 MMHG | SYSTOLIC BLOOD PRESSURE: 102 MMHG | WEIGHT: 221.8 LBS | BODY MASS INDEX: 31.82 KG/M2

## 2023-11-10 DIAGNOSIS — Z87.42 HISTORY OF ABNORMAL CERVICAL PAPANICOLAOU SMEAR: ICD-10-CM

## 2023-11-10 DIAGNOSIS — Z3A.12 12 WEEKS GESTATION OF PREGNANCY: Primary | ICD-10-CM

## 2023-11-10 DIAGNOSIS — E66.9 OBESITY (BMI 30.0-34.9): ICD-10-CM

## 2023-11-10 DIAGNOSIS — G43.009 MIGRAINE WITHOUT AURA AND WITHOUT STATUS MIGRAINOSUS, NOT INTRACTABLE: ICD-10-CM

## 2023-11-10 DIAGNOSIS — Z98.890 HISTORY OF CARDIAC RADIOFREQUENCY ABLATION: ICD-10-CM

## 2023-11-10 LAB
GLUCOSE UR STRIP-MCNC: NEGATIVE MG/DL
PROT UR STRIP-MCNC: NEGATIVE MG/DL

## 2023-11-10 NOTE — PROGRESS NOTES
OB FOLLOW UP  CC- Here for care of pregnancy        Soni Zarate is a 28 y.o.  12w2d patient being seen today for her obstetrical follow up visit. Patient reports no complaints. Patient was advised by Dr. Rosenthal to RTC today for 1 hour GTT and pap smear.    Her prenatal care is complicated by (and status) :  see below  Patient Active Problem List   Diagnosis    Single liveborn, born in hospital, delivered by  section    Generalized anxiety disorder with panic attacks    Recurrent major depressive disorder    Migraine without aura and without status migrainosus, not intractable    History of cardiac radiofrequency ablation    Lumbar disc herniation    Obesity (BMI 30.0-34.9)    Morbid (severe) obesity due to excess calories (*specify comorbidity)    First trimester pregnancy       Desires genetic testing?:  already performed  Flu Status: Declines today, maybe next office visit  Ultrasound Today: No    ROS -   Patient Reports : No Problems  Patient Denies: Loss of Fluid, Vaginal Spotting, Vision Changes, Headaches, Nausea , and Vomiting   Fetal Movement :  not yet.  All other systems reviewed and are negative.     The additional following portions of the patient's history were reviewed and updated as appropriate: allergies, current medications, past family history, past medical history, past social history, past surgical history, and problem list.    I have reviewed and agree with the HPI, ROS, and historical information as entered above. Melany Chester, APRN      /66   Wt 101 kg (221 lb 12.8 oz)   LMP 2023 (Exact Date)   BMI 31.82 kg/m²         EXAM:     Prenatal Vitals  BP: 102/66  Weight: 101 kg (221 lb 12.8 oz)   Fetal Heart Rate: +        Urine Glucose Read-only: Negative  Urine Protein Read-only: Negative       Assessment and Plan    Problem List Items Addressed This Visit       Migraine without aura and without status migrainosus, not intractable    Overview      Imitrex PRN         Relevant Medications    SUMAtriptan (IMITREX) 100 MG tablet    History of cardiac radiofrequency ablation    Overview     Hx SVT, ablation in 2009 and asymptomatic since that time.   No cardiologist currently. Has not had an episode in a long time. Was seeing someone at .   Asymptomatic currently.          Obesity (BMI 30.0-34.9)    Overview     A1C 5.8-prediabetic range @ new OB appt.   150 minutes/week of moderate intensity aerobic activity unless we limit for bleeding, hypertension or other pregnancy complication   Recommend limiting weight gain to 15 to 20 pounds in pregnancy.   Discussed carbohydrate control  Early glucola indicated.             Other Visit Diagnoses       12 weeks gestation of pregnancy    -  Primary    Relevant Orders    Gestational Screen 1 Hr ([facility]) (Completed)    LIQUID-BASED PAP SMEAR WITH HPV GENOTYPING REGARDLESS OF INTERPRETATION ([facility])    POC Urinalysis Dipstick (Completed)    History of abnormal cervical Papanicolaou smear        Relevant Orders    LIQUID-BASED PAP SMEAR WITH HPV GENOTYPING REGARDLESS OF INTERPRETATION ([facility])            Pregnancy at 12w2d  Labs reviewed from New OB Visit.  Pap performed  Counseled on genetic testing, carrier status and option for NT screen  Activity and Exercise discussed.  Patient is on Prenatal vitamins  Discussed/encouraged Flu vaccination  Discussed/encouraged social distancing/COVID19 precautions; encouraged vaccination when able  Return in about 24 days (around 12/4/2023) for JOSE jimenez/Galo.    Melany Chester, APRN  11/10/2023

## 2023-11-11 LAB — GLUCOSE 1H P 50 G GLC PO SERPL-MCNC: 94 MG/DL (ref 65–139)

## 2023-11-14 LAB — REF LAB TEST METHOD: NORMAL

## 2023-11-27 ENCOUNTER — OFFICE VISIT (OUTPATIENT)
Dept: CARDIOLOGY | Facility: CLINIC | Age: 28
End: 2023-11-27
Payer: COMMERCIAL

## 2023-11-27 ENCOUNTER — PATIENT ROUNDING (BHMG ONLY) (OUTPATIENT)
Dept: CARDIOLOGY | Facility: CLINIC | Age: 28
End: 2023-11-27

## 2023-11-27 VITALS
WEIGHT: 217 LBS | OXYGEN SATURATION: 97 % | BODY MASS INDEX: 32.14 KG/M2 | HEIGHT: 69 IN | SYSTOLIC BLOOD PRESSURE: 132 MMHG | DIASTOLIC BLOOD PRESSURE: 84 MMHG | HEART RATE: 103 BPM

## 2023-11-27 DIAGNOSIS — R00.0 SINUS TACHYCARDIA: Primary | ICD-10-CM

## 2023-11-27 PROCEDURE — 93000 ELECTROCARDIOGRAM COMPLETE: CPT | Performed by: INTERNAL MEDICINE

## 2023-11-27 PROCEDURE — 99213 OFFICE O/P EST LOW 20 MIN: CPT | Performed by: INTERNAL MEDICINE

## 2023-11-27 NOTE — PROGRESS NOTES
"[facility] Cardiology  Office visit  Soni Zarate  1995  [phone]  There is no work phone number on file.    VISIT DATE:  11/27/2023    PCP: Lorelei Vann PA-C  [address]  [facility] [address]    CC:  Chief Complaint   Patient presents with    Rapid Heart Rate       Previous cardiac studies and procedures:  2008 approximately: SVT ablation    November 2023 DANICA  TTE: Normal  Ambulatory ECG monitor: Symptoms correlated with sinus tachycardia.    ASSESSMENT:   Diagnosis Plan   1. Sinus tachycardia            PLAN:  Orthostasis: Pregnancy related.  Improving.  No evidence of underlying structural or functional heart disease.  Ambulatory ECG monitor negative for clinically significant arrhythmia.  Offered reassurance regarding the benign nature of this condition.  Counseled her to avoid dehydration and liberalize sodium intake during flares.  Continue regular exercise.  Not recommending initiation of pharmacologic therapy.  We will follow-up 3 months after delivery unless symptoms worsen.    Subjective  28-year-old female is currently 15 weeks into her second pregnancy.  She has been experiencing intermittent lightheadedness in the standing position, in particular when going from a seated to standing position with associated racing heartbeats.  She is experiencing exercise intolerance.  Did have some morning sickness during the first trimester of her pregnancy which has significantly improved.  Associated episodes of lightheadedness have improved as well.  She had some milder symptoms during her first pregnancy.  No tachyarrhythmias which are similar to her previous episodes of SVT.  Denies chest pain or dyspnea.    PHYSICAL EXAMINATION:  Vitals:    11/27/23 1254   BP: 132/84   BP Location: Left arm   Patient Position: Sitting   Pulse: 103   SpO2: 97%   Weight: 98.4 kg (217 lb)   Height: 175.3 cm (69\")     General Appearance:    Alert, " "cooperative, no distress, appears stated age   Head:    Normocephalic, without obvious abnormality, atraumatic   Eyes:    conjunctiva/corneas clear   Nose:   Nares normal, septum midline, mucosa normal, no drainage   Throat:   Lips, teeth and gums normal   Neck:   Supple, symmetrical, trachea midline, no carotid    bruit or JVD   Lungs:     Clear to auscultation bilaterally, respirations unlabored   Chest Wall:    No tenderness or deformity    Heart:    Regular rate and rhythm, S1 and S2 normal, no murmur, rub   or gallop, normal carotid impulse bilaterally without bruit.   Abdomen:     Soft, non-tender   Extremities:   Extremities normal, atraumatic, no cyanosis or edema   Pulses:   2+ and symmetric all extremities   Skin:   Skin color, texture, turgor normal, no rashes or lesions       Diagnostic Data:    ECG 12 Lead    Date/Time: 11/27/2023 1:15 PM  Performed by: Manuel Azul III, MD    Authorized by: Manuel Azul III, MD  Comparison: compared with previous ECG from 12/3/2018  Similar to previous ECG  Rhythm: sinus tachycardia    Clinical impression: normal ECG        No results found for: \"CHLPL\", \"TRIG\", \"HDL\", \"LDLDIRECT\"  Lab Results   Component Value Date    GLUCOSE 126 (H) 07/21/2023    BUN 11 07/21/2023    CREATININE 1.07 (H) 07/21/2023     07/21/2023    K 4.2 07/21/2023     07/21/2023    CO2 20.0 (L) 07/21/2023     Lab Results   Component Value Date    HGBA1C 5.8 (H) 10/06/2023     Lab Results   Component Value Date    WBC 10.4 10/06/2023    HGB 11.3 10/06/2023    HCT 36.6 10/06/2023     10/06/2023       Allergies  No Known Allergies    Current Medications    Current Outpatient Medications:     ondansetron (Zofran) 4 MG tablet, Take 1 tablet by mouth Every 6 (Six) Hours As Needed for Nausea or Vomiting for up to 90 days., Disp: 30 tablet, Rfl: 1    prenatal vitamin (prenatal, CLASSIC, vitamin) tablet, Take  by mouth Daily., Disp: , Rfl:     promethazine (PHENERGAN) 25 MG tablet, Take " 1 tablet by mouth Every 6 (Six) Hours As Needed for Nausea or Vomiting., Disp: 30 tablet, Rfl: 0    SUMAtriptan (IMITREX) 100 MG tablet, TAKE 1 TABLET BY MOUTH AT ONSET OF HEADACHE. MAY REPEAT DOSE 1 TIME IN 2 HOURS IF HEADACHE NOT RELIEVED, Disp: 9 tablet, Rfl: 5          ROS  ROS      SOCIAL HX  Social History     Socioeconomic History    Marital status:    Tobacco Use    Smoking status: Never    Smokeless tobacco: Never   Vaping Use    Vaping Use: Never used    Passive vaping exposure: Yes   Substance and Sexual Activity    Alcohol use: No    Drug use: No    Sexual activity: Yes     Partners: Male       FAMILY HX  Family History   Problem Relation Age of Onset    Diabetes Mother     Hypertension Mother     Mental illness Mother     Migraines Mother     Thyroid disease Mother     Depression Mother     Anxiety disorder Mother     Diabetes Father     Hyperlipidemia Father     Mental illness Father     Asthma Brother     Heart attack Maternal Grandfather     Breast cancer Neg Hx     Ovarian cancer Neg Hx     Uterine cancer Neg Hx     Colon cancer Neg Hx              Manuel Azul III, MD, FACC

## 2023-11-27 NOTE — PROGRESS NOTES
November 27, 2023    Hello, may I speak with Soni Zarate?    My name is Hannah      I am  with [facility] CARDIOLOGY  [address]  [facility] [address] [phone].    Before we get started may I verify your date of birth? 1995    I am calling to officially welcome you to our practice and ask about your recent visit. Is this a good time to talk? yes    Tell me about your visit with us. What things went well?  everything       We're always looking for ways to make our patients' experiences even better. Do you have recommendations on ways we may improve?  no    Overall were you satisfied with your first visit to our practice? yes       I appreciate you taking the time to speak with me today. Is there anything else I can do for you? no      Thank you, and have a great day.

## 2023-12-04 ENCOUNTER — ROUTINE PRENATAL (OUTPATIENT)
Dept: OBSTETRICS AND GYNECOLOGY | Facility: CLINIC | Age: 28
End: 2023-12-04
Payer: COMMERCIAL

## 2023-12-04 VITALS — WEIGHT: 221 LBS | BODY MASS INDEX: 32.64 KG/M2 | SYSTOLIC BLOOD PRESSURE: 118 MMHG | DIASTOLIC BLOOD PRESSURE: 70 MMHG

## 2023-12-04 DIAGNOSIS — E66.9 OBESITY (BMI 30.0-34.9): ICD-10-CM

## 2023-12-04 DIAGNOSIS — G43.009 MIGRAINE WITHOUT AURA AND WITHOUT STATUS MIGRAINOSUS, NOT INTRACTABLE: ICD-10-CM

## 2023-12-04 DIAGNOSIS — Z34.92 SECOND TRIMESTER PREGNANCY: Primary | ICD-10-CM

## 2023-12-04 DIAGNOSIS — Z98.891 PREVIOUS CESAREAN SECTION: ICD-10-CM

## 2023-12-04 PROBLEM — E66.01 MORBID (SEVERE) OBESITY DUE TO EXCESS CALORIES: Status: RESOLVED | Noted: 2023-05-31 | Resolved: 2023-12-04

## 2023-12-04 LAB
GLUCOSE UR STRIP-MCNC: NEGATIVE MG/DL
PROT UR STRIP-MCNC: NEGATIVE MG/DL

## 2023-12-04 RX ORDER — METOCLOPRAMIDE 10 MG/1
10 TABLET ORAL 3 TIMES DAILY PRN
Qty: 20 TABLET | Refills: 1 | Status: SHIPPED | OUTPATIENT
Start: 2023-12-04

## 2023-12-04 NOTE — PROGRESS NOTES
OB FOLLOW UP  CC- Here for care of pregnancy        Soni Zarate is a 28 y.o.  15w5d patient being seen today for her obstetrical follow up visit. Patient reports headache. That is not relieved by Tylenol or rest. Pt c/o of HA x5 days, taking Tylenol and Imitrex without relief. She denies vision changes, eye exam within this year. Declines AFP    Her prenatal care is complicated by (and status) : None  Patient Active Problem List   Diagnosis    Single liveborn, born in hospital, delivered by  section    Generalized anxiety disorder with panic attacks    Recurrent major depressive disorder    Migraine without aura and without status migrainosus, not intractable    History of cardiac radiofrequency ablation    Lumbar disc herniation    Obesity (BMI 30.0-34.9)    First trimester pregnancy    Sinus tachycardia    Previous  section    Second trimester pregnancy       Flu Status: Desires at future appt  Ultrasound Today: No    AFP: declines    ROS -   Patient Reports : Headaches  Patient Denies: Loss of Fluid, Vaginal Spotting, Vision Changes, Nausea , Vomiting , Contractions, and Epigastric pain  Fetal Movement : absent  All other systems reviewed and are negative.       The additional following portions of the patient's history were reviewed and updated as appropriate: allergies, current medications, past family history, past medical history, past social history, past surgical history, and problem list.      I have reviewed and agree with the HPI, ROS, and historical information as entered above. Chelle Rosenthal MD          EXAM:     Prenatal Vitals  BP: 118/70  Weight: 100 kg (221 lb)   Fetal Heart Rate: pos   Pelvic Exam:        Urine Glucose Read-only: Negative  Urine Protein Read-only: Negative           Assessment and Plan    Problem List Items Addressed This Visit       Migraine without aura and without status migrainosus, not intractable    Overview     Imitrex PRN          Relevant Medications    SUMAtriptan (IMITREX) 100 MG tablet    Obesity (BMI 30.0-34.9)    Overview     A1C 5.8-prediabetic range @ new OB appt.   150 minutes/week of moderate intensity aerobic activity unless we limit for bleeding, hypertension or other pregnancy complication   Recommend limiting weight gain to 15 to 20 pounds in pregnancy.   Discussed carbohydrate control  Early glucola indicated.            Previous  section    Second trimester pregnancy - Primary    Relevant Orders    POC Urinalysis Dipstick (Completed)    US Ob 14 + Weeks Single or First Gestation       Pregnancy at 15w5d  Fetal status reassuring.   Counseled on MSAFP alone in relation to OTD and placental issues.    Anatomy scan next visit.   Activity and Exercise discussed.  Medication(s) Ordered  Patient is on Prenatal vitamins  Return in about 4 weeks (around 2024) for WITH SONO.    Chelle Rosenthal MD  2023

## 2024-01-08 ENCOUNTER — ROUTINE PRENATAL (OUTPATIENT)
Dept: OBSTETRICS AND GYNECOLOGY | Facility: CLINIC | Age: 29
End: 2024-01-08
Payer: MEDICAID

## 2024-01-08 VITALS — WEIGHT: 220 LBS | BODY MASS INDEX: 32.49 KG/M2 | SYSTOLIC BLOOD PRESSURE: 110 MMHG | DIASTOLIC BLOOD PRESSURE: 74 MMHG

## 2024-01-08 DIAGNOSIS — O44.42 LOW LYING PLACENTA NOS OR WITHOUT HEMORRHAGE, SECOND TRIMESTER: ICD-10-CM

## 2024-01-08 DIAGNOSIS — Z34.92 SECOND TRIMESTER PREGNANCY: Primary | ICD-10-CM

## 2024-01-08 DIAGNOSIS — Z98.891 PREVIOUS CESAREAN SECTION: ICD-10-CM

## 2024-01-08 LAB
GLUCOSE UR STRIP-MCNC: NEGATIVE MG/DL
PROT UR STRIP-MCNC: NEGATIVE MG/DL

## 2024-01-08 NOTE — PROGRESS NOTES
OB FOLLOW UP  CC- Here for care of pregnancy        Soni Zarate is a 28 y.o.  20w5d patient being seen today for her obstetrical follow up visit. Patient reports pelvic pain. Pt having severe pelvic pain that started 1mo ago, worse when walking. Increased +fm    Her prenatal care is complicated by (and status) :   Patient Active Problem List   Diagnosis    Single liveborn, born in hospital, delivered by  section    Generalized anxiety disorder with panic attacks    Recurrent major depressive disorder    Migraine without aura and without status migrainosus, not intractable    History of cardiac radiofrequency ablation    Lumbar disc herniation    Obesity (BMI 30.0-34.9)    First trimester pregnancy    Sinus tachycardia    Previous  section    Second trimester pregnancy    Low lying placenta nos or without hemorrhage, second trimester       Flu Status: Already given in current flu season  Ultrasound Today: Yes    ROS -   Patient Reports :  pelvic pain  Patient Denies: Loss of Fluid, Vaginal Spotting, Vision Changes, Headaches, Nausea , Vomiting , Contractions, and Epigastric pain  Fetal Movement :  increased  All other systems reviewed and are negative.       The additional following portions of the patient's history were reviewed and updated as appropriate: allergies, current medications, past family history, past medical history, past social history, past surgical history, and problem list.      I have reviewed and agree with the HPI, ROS, and historical information as entered above. Chelle Rosenthal MD      /74   Wt 99.8 kg (220 lb)   LMP 2023 (Exact Date)   BMI 32.49 kg/m²       EXAM:     Prenatal Vitals  BP: 110/74  Weight: 99.8 kg (220 lb)   Fetal Heart Rate: pos          Urine Glucose Read-only: Negative  Urine Protein Read-only: Negative       Assessment and Plan    Problem List Items Addressed This Visit       Previous  section    Overview      Arrest of dilation at 8cm - 8lb 4oz  Desires Repeat         Second trimester pregnancy - Primary    Relevant Orders    POC Urinalysis Dipstick (Completed)    US Ob Follow Up Transabdominal Approach    Low lying placenta nos or without hemorrhage, second trimester       Pregnancy at 20w5d  Anatomy scan today is incomplete, follow up in 4 weeks for additional views. Anatomy that was visualized was within normal limits.  Fetal status reassuring.   Activity and Exercise discussed.  U/S ordered at follow up  Patient is on Prenatal vitamins  Return in about 4 weeks (around 2/5/2024) for WITH SONO.      Chelle Rosenthal MD  01/08/2024

## 2024-01-13 PROBLEM — O44.42 LOW LYING PLACENTA NOS OR WITHOUT HEMORRHAGE, SECOND TRIMESTER: Status: ACTIVE | Noted: 2024-01-13

## 2024-02-05 ENCOUNTER — ROUTINE PRENATAL (OUTPATIENT)
Dept: OBSTETRICS AND GYNECOLOGY | Facility: CLINIC | Age: 29
End: 2024-02-05
Payer: MEDICAID

## 2024-02-05 VITALS — SYSTOLIC BLOOD PRESSURE: 110 MMHG | WEIGHT: 224 LBS | BODY MASS INDEX: 33.08 KG/M2 | DIASTOLIC BLOOD PRESSURE: 68 MMHG

## 2024-02-05 DIAGNOSIS — Z98.891 PREVIOUS CESAREAN SECTION: ICD-10-CM

## 2024-02-05 DIAGNOSIS — Z34.92 SECOND TRIMESTER PREGNANCY: Primary | ICD-10-CM

## 2024-02-05 LAB
GLUCOSE UR STRIP-MCNC: NEGATIVE MG/DL
PROT UR STRIP-MCNC: NEGATIVE MG/DL

## 2024-03-04 ENCOUNTER — ROUTINE PRENATAL (OUTPATIENT)
Dept: OBSTETRICS AND GYNECOLOGY | Facility: CLINIC | Age: 29
End: 2024-03-04
Payer: MEDICAID

## 2024-03-04 VITALS — WEIGHT: 225.8 LBS | DIASTOLIC BLOOD PRESSURE: 62 MMHG | BODY MASS INDEX: 32.4 KG/M2 | SYSTOLIC BLOOD PRESSURE: 120 MMHG

## 2024-03-04 DIAGNOSIS — Z98.891 PREVIOUS CESAREAN SECTION: ICD-10-CM

## 2024-03-04 DIAGNOSIS — Z34.83 MULTIGRAVIDA IN THIRD TRIMESTER: Primary | ICD-10-CM

## 2024-03-04 NOTE — PROGRESS NOTES
OB FOLLOW UP  CC- Here for care of pregnancy        Soni Zarate is a 28 y.o.  28w5d patient being seen today for her obstetrical follow up. Patient reports skin itching.     Patient undergoing Glucola testing today. She is due for her testing at 12:55 pm.       MBT: O+  Rhogam:  N/A  28 week packet: reviewed with patient   TDAP:  will discuss with provider  Flu Status: Declines  Ultrasound Today: No    Her prenatal care is complicated by (and status) :   Patient Active Problem List   Diagnosis    Single liveborn, born in hospital, delivered by  section    Generalized anxiety disorder with panic attacks    Recurrent major depressive disorder    Migraine without aura and without status migrainosus, not intractable    History of cardiac radiofrequency ablation    Lumbar disc herniation    Obesity (BMI 30.0-34.9)    First trimester pregnancy    Sinus tachycardia    Previous  section    Second trimester pregnancy    Low lying placenta nos or without hemorrhage, second trimester    Multigravida in third trimester         ROS -   Patient Denies: Loss of Fluid, Vaginal Spotting, Vision Changes, Headaches, Nausea , Vomiting , Contractions, and Epigastric pain  Fetal Movement : normal    The additional following portions of the patient's history were reviewed and updated as appropriate: allergies, current medications, past family history, past medical history, past social history, past surgical history, and problem list.    I have reviewed and agree with the HPI, ROS, and historical information as entered above. Chelle Rosenthal MD      /62   Wt 102 kg (225 lb 12.8 oz)   LMP 2023 (Exact Date)   BMI 32.40 kg/m²         EXAM:     Prenatal Vitals  BP: 120/62  Weight: 102 kg (225 lb 12.8 oz)   Fetal Heart Rate: pos      Fundal Height (cm): 30 cm              Assessment and Plan    Problem List Items Addressed This Visit       Previous  section    Overview     Arrest  of dilation at 8cm - 8lb 4oz  Desires Repeat         Multigravida in third trimester - Primary    Relevant Orders    POC Urinalysis Dipstick    CBC (No Diff) (Completed)    Gestational Screen 1 Hr ([facility]) (Completed)    Antibody Screen (Completed)    RPR (Completed)       Pregnancy at 28w5d  1 hr Glucola, CBC, RPR. Antibody screen and TDAP today  Fetal movement/PTL or Labor precautions  Lab(s) Ordered  Reviewed Pre-eclampsia signs/symptoms  Activity and Exercise discussed.  Return in about 4 weeks (around 4/1/2024) for Next scheduled follow up.        Chelle Rosenthal MD  03/04/2024

## 2024-03-05 ENCOUNTER — TELEPHONE (OUTPATIENT)
Dept: OBSTETRICS AND GYNECOLOGY | Facility: CLINIC | Age: 29
End: 2024-03-05
Payer: MEDICAID

## 2024-03-05 LAB
BLD GP AB SCN SERPL QL: NEGATIVE
ERYTHROCYTE [DISTWIDTH] IN BLOOD BY AUTOMATED COUNT: 15.1 % (ref 12.3–15.4)
GLUCOSE 1H P 50 G GLC PO SERPL-MCNC: 109 MG/DL (ref 65–139)
HCT VFR BLD AUTO: 31.7 % (ref 34–46.6)
HGB BLD-MCNC: 9.9 G/DL (ref 12–15.9)
MCH RBC QN AUTO: 23.4 PG (ref 26.6–33)
MCHC RBC AUTO-ENTMCNC: 31.2 G/DL (ref 31.5–35.7)
MCV RBC AUTO: 74.9 FL (ref 79–97)
PLATELET # BLD AUTO: 247 10*3/MM3 (ref 140–450)
RBC # BLD AUTO: 4.23 10*6/MM3 (ref 3.77–5.28)
RPR SER QL: NON REACTIVE
WBC # BLD AUTO: 13.5 10*3/MM3 (ref 3.4–10.8)

## 2024-03-05 NOTE — TELEPHONE ENCOUNTER
Called and informed patient of wnl lab results, except patient's hgb was a little low. Per Dr. Suarez, I advised patient to start taking slow fe, which is over the counter iron supplement, one twice day. Patient voiced understanding.

## 2024-03-13 ENCOUNTER — TELEPHONE (OUTPATIENT)
Dept: OBSTETRICS AND GYNECOLOGY | Facility: CLINIC | Age: 29
End: 2024-03-13
Payer: MEDICAID

## 2024-03-13 NOTE — TELEPHONE ENCOUNTER
DORENE pt   30w0d    SW pt. Pt reports possibly has sinus infection. Medications reviewed with pt. Instructed pt to report to PCP or UTC if s/s do not resolve or improve. Pt AMANDA.

## 2024-03-16 PROBLEM — Z34.83 MULTIGRAVIDA IN THIRD TRIMESTER: Status: ACTIVE | Noted: 2024-03-16

## 2024-04-01 ENCOUNTER — ROUTINE PRENATAL (OUTPATIENT)
Dept: OBSTETRICS AND GYNECOLOGY | Facility: CLINIC | Age: 29
End: 2024-04-01
Payer: COMMERCIAL

## 2024-04-01 VITALS — WEIGHT: 230.8 LBS | BODY MASS INDEX: 33.12 KG/M2 | SYSTOLIC BLOOD PRESSURE: 118 MMHG | DIASTOLIC BLOOD PRESSURE: 70 MMHG

## 2024-04-01 DIAGNOSIS — L29.9 PRURITUS: ICD-10-CM

## 2024-04-01 DIAGNOSIS — Z34.93 PRENATAL CARE IN THIRD TRIMESTER: Primary | ICD-10-CM

## 2024-04-01 DIAGNOSIS — E66.9 OBESITY (BMI 30.0-34.9): ICD-10-CM

## 2024-04-01 LAB
GLUCOSE UR STRIP-MCNC: NEGATIVE MG/DL
PROT UR STRIP-MCNC: NEGATIVE MG/DL

## 2024-04-01 RX ORDER — UREA 10 %
LOTION (ML) TOPICAL
COMMUNITY

## 2024-04-01 RX ORDER — URSODIOL 300 MG/1
300 CAPSULE ORAL 3 TIMES DAILY
Qty: 90 CAPSULE | Refills: 1 | Status: SHIPPED | OUTPATIENT
Start: 2024-04-01

## 2024-04-01 NOTE — PROGRESS NOTES
OB FOLLOW UP  CC- Here for care of pregnancy        Soni Zarate is a 28 y.o.  32w5d patient being seen today for her obstetrical follow up visit. Patient reports pelvic pain. She had brown spotting 3-24-24 and she talked to the on call doctor. No more spotting noted. Her hands, breasts, and legs are itchy, it is not worse at night.    Her prenatal care is complicated by (and status) :    Patient Active Problem List   Diagnosis    Generalized anxiety disorder with panic attacks    Recurrent major depressive disorder    Migraine without aura and without status migrainosus, not intractable    History of cardiac radiofrequency ablation    Lumbar disc herniation    Obesity (BMI 30.0-34.9)    Sinus tachycardia    Previous  section    Low lying placenta nos or without hemorrhage, second trimester    Multigravida in third trimester    Pruritus    Third trimester pregnancy       Flu Status: Declines  TDAP status: declines  Rhogam status: was not indicated  28 week labs: Reviewed  Ultrasound Today: No  Non Stress Test: No.      ROS -   Patient Denies: Loss of Fluid, Vaginal Spotting, Vision Changes, Headaches, Nausea , Vomiting , Contractions, and Epigastric pain  Fetal Movement : normal  All other systems reviewed and are negative.       The additional following portions of the patient's history were reviewed and updated as appropriate: allergies, current medications, past family history, past medical history, past social history, past surgical history, and problem list.    I have reviewed and agree with the HPI, ROS, and historical information as entered above. Chelle Rosenthal MD      /70   Wt 105 kg (230 lb 12.8 oz)   LMP 2023 (Exact Date)   BMI 33.12 kg/m²         EXAM:     Prenatal Vitals  BP: 118/70  Weight: 105 kg (230 lb 12.8 oz)                   Urine Glucose Read-only: Negative  Urine Protein Read-only: Negative           Assessment and Plan    Problem List Items  Addressed This Visit       Obesity (BMI 30.0-34.9)    Overview     A1C 5.8-prediabetic range @ new OB appt.   150 minutes/week of moderate intensity aerobic activity unless we limit for bleeding, hypertension or other pregnancy complication   Recommend limiting weight gain to 15 to 20 pounds in pregnancy.   Discussed carbohydrate control  Early glucola indicated.            Pruritus    Relevant Orders    Bile Acids, Total (Completed)    Hepatic Function Panel (Completed)    RESOLVED: Single liveborn, born in hospital, delivered by  section    Overview     Desires Repeat          RESOLVED: Prenatal care in third trimester - Primary    Relevant Orders    POC Urinalysis Dipstick (Completed)       Pregnancy at 32w5d  Fetal status reassuring.  28 week labs reviewed.    Activity and Exercise discussed.  Fetal movement/PTL or Labor precautions  Return in about 2 weeks (around 4/15/2024).    Chelle Rosenthal MD  2024

## 2024-04-02 LAB
ALBUMIN SERPL-MCNC: 3.5 G/DL (ref 4–5)
ALP SERPL-CCNC: 179 IU/L (ref 44–121)
ALT SERPL-CCNC: 21 IU/L (ref 0–32)
AST SERPL-CCNC: 16 IU/L (ref 0–40)
BILIRUB DIRECT SERPL-MCNC: <0.1 MG/DL (ref 0–0.4)
BILIRUB SERPL-MCNC: <0.2 MG/DL (ref 0–1.2)
PROT SERPL-MCNC: 6 G/DL (ref 6–8.5)

## 2024-04-03 ENCOUNTER — TELEPHONE (OUTPATIENT)
Dept: OBSTETRICS AND GYNECOLOGY | Facility: CLINIC | Age: 29
End: 2024-04-03
Payer: COMMERCIAL

## 2024-04-03 LAB — BILE AC SERPL-SCNC: 4.3 UMOL/L (ref 0–10)

## 2024-04-03 NOTE — TELEPHONE ENCOUNTER
Pt is requesting a nurse call about her recent test results and wanting to know if a medication needs to be discontinued

## 2024-04-03 NOTE — TELEPHONE ENCOUNTER
Returned patient's call. Patient of Dr. Rosenthal;  @ 33w0d.   She c/o itching at her prenatal visit on 24; had labs done to evaluate for cholestasis. Was started on Actigall.   She saw the results of Hepatic Function Panel and is asking if she has cholestasis and if she needs to continue the Actigall.   Informed her that Dr. Rosenthal is out of the office for the rest of the week and has not seen those results. Bile acids are still pending. Will need those results to determine if she has cholestasis. Advised her her to continue the Actigall as instructed until those results are available. She v/u and agreed.

## 2024-04-15 ENCOUNTER — ROUTINE PRENATAL (OUTPATIENT)
Dept: OBSTETRICS AND GYNECOLOGY | Facility: CLINIC | Age: 29
End: 2024-04-15
Payer: COMMERCIAL

## 2024-04-15 VITALS — SYSTOLIC BLOOD PRESSURE: 120 MMHG | WEIGHT: 229 LBS | BODY MASS INDEX: 32.86 KG/M2 | DIASTOLIC BLOOD PRESSURE: 78 MMHG

## 2024-04-15 DIAGNOSIS — Z98.891 PREVIOUS CESAREAN SECTION: ICD-10-CM

## 2024-04-15 DIAGNOSIS — G43.009 MIGRAINE WITHOUT AURA AND WITHOUT STATUS MIGRAINOSUS, NOT INTRACTABLE: ICD-10-CM

## 2024-04-15 DIAGNOSIS — Z34.93 THIRD TRIMESTER PREGNANCY: Primary | ICD-10-CM

## 2024-04-15 DIAGNOSIS — E66.9 OBESITY (BMI 30.0-34.9): ICD-10-CM

## 2024-04-15 PROBLEM — Z34.92 SECOND TRIMESTER PREGNANCY: Status: RESOLVED | Noted: 2023-12-04 | Resolved: 2024-04-15

## 2024-04-15 PROBLEM — Z34.91 FIRST TRIMESTER PREGNANCY: Status: RESOLVED | Noted: 2023-11-06 | Resolved: 2024-04-15

## 2024-04-15 LAB
GLUCOSE UR STRIP-MCNC: NEGATIVE MG/DL
PROT UR STRIP-MCNC: NEGATIVE MG/DL

## 2024-04-15 PROCEDURE — 0502F SUBSEQUENT PRENATAL CARE: CPT | Performed by: OBSTETRICS & GYNECOLOGY

## 2024-04-15 NOTE — PROGRESS NOTES
OB FOLLOW UP  CC- Here for care of pregnancy        Soni Zarate is a 28 y.o.  34w5d patient being seen today for her obstetrical follow up visit. Patient reports migraine last night that she had to use Imitrex for relief. She still has floaters in her eyes. Good fm, occ BH ctx.     Her prenatal care is complicated by (and status) :    Patient Active Problem List   Diagnosis    Generalized anxiety disorder with panic attacks    Recurrent major depressive disorder    Migraine without aura and without status migrainosus, not intractable    History of cardiac radiofrequency ablation    Lumbar disc herniation    Obesity (BMI 30.0-34.9)    Sinus tachycardia    Previous  section    Low lying placenta nos or without hemorrhage, second trimester    Multigravida in third trimester    Pruritus    Third trimester pregnancy       Flu Status: Already given in current flu season  TDAP status: declines  Rhogam status: was not indicated  28 week labs: Reviewed and normal  Ultrasound Today: No  Non Stress Test: No.      ROS -   Patient Denies: Loss of Fluid, Vaginal Spotting, Nausea , Vomiting , Epigastric pain, and skin itching  Fetal Movement : normal  All other systems reviewed and are negative.       The additional following portions of the patient's history were reviewed and updated as appropriate: allergies, current medications, past family history, past medical history, past social history, past surgical history, and problem list.    I have reviewed and agree with the HPI, ROS, and historical information as entered above. Chelle Rosenthal MD      /78   Wt 104 kg (229 lb)   LMP 2023 (Exact Date)   BMI 32.86 kg/m²         EXAM:     Prenatal Vitals  BP: 120/78  Weight: 104 kg (229 lb)   Fetal Heart Rate: pos      Fundal Height (cm): 36 cm        Urine Glucose Read-only: Negative  Urine Protein Read-only: Negative           Assessment and Plan    Problem List Items Addressed This  Visit       Migraine without aura and without status migrainosus, not intractable    Overview     Imitrex PRN         Relevant Medications    SUMAtriptan (IMITREX) 100 MG tablet    Obesity (BMI 30.0-34.9)    Overview     A1C 5.8-prediabetic range @ new OB appt.   150 minutes/week of moderate intensity aerobic activity unless we limit for bleeding, hypertension or other pregnancy complication   Recommend limiting weight gain to 15 to 20 pounds in pregnancy.   Discussed carbohydrate control  Early glucola indicated.            Previous  section    Overview     Arrest of dilation at 8cm - 8lb 4oz  Desires Repeat         Third trimester pregnancy - Primary    Relevant Orders    POC Urinalysis Dipstick (Completed)    RESOLVED: Single liveborn, born in hospital, delivered by  section    Overview     Desires Repeat             Pregnancy at 34w5d  Fetal status reassuring.  28 week labs reviewed.    Activity and Exercise discussed.  Fetal movement/PTL or Labor precautions  Reviewed PP contraception options  Return in about 2 weeks (around 2024) for Next scheduled follow up.    Chelle Rosenthal MD  04/15/2024

## 2024-04-25 ENCOUNTER — TELEPHONE (OUTPATIENT)
Dept: OBSTETRICS AND GYNECOLOGY | Facility: CLINIC | Age: 29
End: 2024-04-25

## 2024-04-30 ENCOUNTER — LAB (OUTPATIENT)
Dept: LAB | Facility: HOSPITAL | Age: 29
End: 2024-04-30
Payer: COMMERCIAL

## 2024-04-30 ENCOUNTER — ROUTINE PRENATAL (OUTPATIENT)
Dept: OBSTETRICS AND GYNECOLOGY | Facility: CLINIC | Age: 29
End: 2024-04-30
Payer: COMMERCIAL

## 2024-04-30 VITALS — WEIGHT: 231.6 LBS | SYSTOLIC BLOOD PRESSURE: 114 MMHG | BODY MASS INDEX: 33.23 KG/M2 | DIASTOLIC BLOOD PRESSURE: 68 MMHG

## 2024-04-30 DIAGNOSIS — Z98.891 PREVIOUS CESAREAN SECTION: ICD-10-CM

## 2024-04-30 DIAGNOSIS — Z34.93 PRENATAL CARE IN THIRD TRIMESTER: Primary | ICD-10-CM

## 2024-04-30 DIAGNOSIS — E66.9 OBESITY (BMI 30.0-34.9): ICD-10-CM

## 2024-04-30 LAB
GLUCOSE UR STRIP-MCNC: NEGATIVE MG/DL
PROT UR STRIP-MCNC: NEGATIVE MG/DL

## 2024-04-30 PROCEDURE — 87081 CULTURE SCREEN ONLY: CPT | Performed by: OBSTETRICS & GYNECOLOGY

## 2024-04-30 NOTE — PROGRESS NOTES
OB FOLLOW UP  CC- Here for care of pregnancy        Soni Zarate is a 28 y.o.  36w6d patient being seen today for her obstetrical follow up visit. Patient reports occasional rhea valenzuela.     Her prenatal care is complicated by (and status) :  Patient Active Problem List   Diagnosis    Generalized anxiety disorder with panic attacks    Recurrent major depressive disorder    Migraine without aura and without status migrainosus, not intractable    History of cardiac radiofrequency ablation    Lumbar disc herniation    Obesity (BMI 30.0-34.9)    Sinus tachycardia    Previous  section    Low lying placenta nos or without hemorrhage, second trimester    Multigravida in third trimester    Pruritus    Third trimester pregnancy    Acute abdominal pain    Prenatal care in third trimester       GBS Status: Done Today. She is not allergic to PCN.    No Known Allergies       Her Delivery Plan is: Repeat . Scheduled    US today: no  Non Stress Test: No.    ROS -   Patient Denies: Loss of Fluid, Vaginal Spotting, Vision Changes, Headaches, Contractions, Epigastric pain, and skin itching  Fetal Movement : normal  All other systems reviewed and are negative.       The additional following portions of the patient's history were reviewed and updated as appropriate: allergies, current medications, past family history, past medical history, past social history, past surgical history, and problem list.    I have reviewed and agree with the HPI, ROS, and historical information as entered above. Chelle Rosenthal MD       EXAM:     Prenatal Vitals  BP: 114/68  Weight: 105 kg (231 lb 9.6 oz)   Fetal Heart Rate: pos   Fundal Height (cm): 37 cm          Urine Glucose Read-only: Negative  Urine Protein Read-only: Negative           Assessment and Plan    Problem List Items Addressed This Visit       Obesity (BMI 30.0-34.9)    Overview     A1C 5.8-prediabetic range @ new OB appt.   150 minutes/week of  moderate intensity aerobic activity unless we limit for bleeding, hypertension or other pregnancy complication   Recommend limiting weight gain to 15 to 20 pounds in pregnancy.   Discussed carbohydrate control  Early glucola indicated.            Previous  section    Overview     Arrest of dilation at 8cm - 8lb 4oz  Desires Repeat         Relevant Medications    oxytocin (PITOCIN) 30 units in 0.9% sodium chloride 500 mL (premix)    Prenatal care in third trimester - Primary    Relevant Orders    POC Urinalysis Dipstick (Completed)    Group B Streptococcus Culture - Swab, Vaginal/Rectum (Completed)       Pregnancy at 36w6d  Fetal status reassuring.   Reviewed Pre-eclampsia signs/symptoms  Discussed IOL options with patient. Pt. desires IOL at 39 weeks.   Delivery options reviewed with patient  Signs of labor reviewed  Kick counts reviewed  Activity and Exercise discussed.  No follow-ups on file.    Chelle Rosenthal MD  2024

## 2024-05-03 LAB — BACTERIA SPEC AEROBE CULT: NORMAL

## 2024-05-06 ENCOUNTER — ROUTINE PRENATAL (OUTPATIENT)
Dept: OBSTETRICS AND GYNECOLOGY | Facility: CLINIC | Age: 29
End: 2024-05-06
Payer: COMMERCIAL

## 2024-05-06 VITALS — DIASTOLIC BLOOD PRESSURE: 60 MMHG | SYSTOLIC BLOOD PRESSURE: 110 MMHG | WEIGHT: 235.8 LBS | BODY MASS INDEX: 33.83 KG/M2

## 2024-05-06 DIAGNOSIS — Z34.83 MULTIGRAVIDA IN THIRD TRIMESTER: Primary | ICD-10-CM

## 2024-05-06 LAB
GLUCOSE UR STRIP-MCNC: NEGATIVE MG/DL
PROT UR STRIP-MCNC: NEGATIVE MG/DL

## 2024-05-06 NOTE — PROGRESS NOTES
OB FOLLOW UP  CC- Here for care of pregnancy        Soni Zarate is a 28 y.o.  37w5d patient being seen today for her obstetrical follow up visit. Patient reports sharp low abdominal cramping, intense pelvic pressure, and low back pain for 4 days.     Her prenatal care is complicated by (and status) :   Patient Active Problem List   Diagnosis    Generalized anxiety disorder with panic attacks    Recurrent major depressive disorder    Migraine without aura and without status migrainosus, not intractable    History of cardiac radiofrequency ablation    Lumbar disc herniation    Obesity (BMI 30.0-34.9)    Sinus tachycardia    Previous  section    Low lying placenta nos or without hemorrhage, second trimester    Multigravida in third trimester    Pruritus    Third trimester pregnancy    Acute abdominal pain    Prenatal care in third trimester       GBS Status:   Group B Strep Culture   Date Value Ref Range Status   2024 No Group B Streptococcus isolated  Final         No Known Allergies       Flu Status: Already given in current flu season  Her Delivery Plan is: Repeat . Scheduled  05/15/87290  US today: no  Non Stress Test: No.    ROS -   Patient Denies: Loss of Fluid, Vaginal Spotting, Vision Changes, Nausea , Vomiting , Contractions, Epigastric pain, and skin itching  Fetal Movement : normal  All other systems reviewed and are negative.       The additional following portions of the patient's history were reviewed and updated as appropriate: allergies, current medications, past family history, past medical history, past social history, past surgical history, and problem list.    I have reviewed and agree with the HPI, ROS, and historical information as entered above. Chelle Rosenthal MD        EXAM:     Prenatal Vitals  BP: 110/60  Weight: 107 kg (235 lb 12.8 oz)   Fetal Heart Rate: pos   Fundal Height (cm): 38 cm          Urine Glucose Read-only: Negative  Urine Protein  Read-only: Negative           Assessment and Plan    Problem List Items Addressed This Visit       Multigravida in third trimester - Primary    Relevant Orders    POC Urinalysis Dipstick (Completed)       Pregnancy at 37w5d  Fetal status reassuring.   Reviewed Pre-eclampsia signs/symptoms  Reviewed upcoming c/s and PAT instructions with patient. Arrival time and NPO status reviewed.   Delivery options reviewed with patient  Signs of labor reviewed  Kick counts reviewed  Activity and Exercise discussed.  No follow-ups on file.    Chelle Rosenthal MD  05/06/2024

## 2024-05-09 ENCOUNTER — HOSPITAL ENCOUNTER (OUTPATIENT)
Facility: HOSPITAL | Age: 29
Discharge: HOME | End: 2024-05-09
Attending: OBSTETRICS & GYNECOLOGY | Admitting: OBSTETRICS & GYNECOLOGY
Payer: COMMERCIAL

## 2024-05-09 VITALS
RESPIRATION RATE: 18 BRPM | TEMPERATURE: 98.1 F | BODY MASS INDEX: 33.76 KG/M2 | OXYGEN SATURATION: 97 % | SYSTOLIC BLOOD PRESSURE: 127 MMHG | DIASTOLIC BLOOD PRESSURE: 85 MMHG | WEIGHT: 235.8 LBS | HEIGHT: 70 IN | HEART RATE: 97 BPM

## 2024-05-09 PROCEDURE — 59025 FETAL NON-STRESS TEST: CPT

## 2024-05-09 PROCEDURE — 99213 OFFICE O/P EST LOW 20 MIN: CPT | Performed by: OBSTETRICS & GYNECOLOGY

## 2024-05-09 PROCEDURE — G0463 HOSPITAL OUTPT CLINIC VISIT: HCPCS

## 2024-05-09 PROCEDURE — 59025 FETAL NON-STRESS TEST: CPT | Performed by: OBSTETRICS & GYNECOLOGY

## 2024-05-10 NOTE — H&P
"[facility]  Obstetric History and Physical    Chief Complaint   Patient presents with    Decreased Fetal Movement     HPI:      Patient is a 28 y.o. female  currently at 38w1d, who presents with decreased fetal movement.  She really had not felt movement all day.  She thought maybe he was just having a lazy day, but got more concerned as the day went on.   She felt little movement when the straps were placed for monitoring, but not a lot.   USN showed BPP 8/8.   Good movement and RNST.  She denies vaginal leaking and bleeding.   No regular contractions.   She is to have a RLTCS next week.         The following portions of the patients history were reviewed and updated as appropriate: current medications, allergies, past medical history, past surgical history, past family history, past social history and problem list .       Prenatal Information:   Maternal Prenatal Labs  Blood Type No results found for: \"ABO\"   Rh Status No results found for: \"RH\"   Antibody Screen No results found for: \"ABSCRN\"   Gonnorhea No results found for: \"GCCX\"   Chlamydia No results found for: \"CLAMYDCU\"   RPR No results found for: \"RPR\"   Syphilis Antibody No results found for: \"SYPHILIS\"   Rubella No results found for: \"RUBELLAIGGIN\"   Hepatitis B Surface Antigen No results found for: \"HEPBSAG\"   HIV-1 Antibody No results found for: \"LABHIV1\"   Hepatitis C Antibody No results found for: \"HEPCAB\"   Rapid Urin Drug Screen No results found for: \"AMPMETHU\", \"BARBITSCNUR\", \"LABBENZSCN\", \"LABMETHSCN\", \"LABOPIASCN\", \"THCURSCR\", \"COCAINEUR\", \"AMPHETSCREEN\", \"PROPOXSCN\", \"BUPRENORSCNU\", \"METAMPSCNUR\", \"OXYCODONESCN\", \"TRICYCLICSCN\"   Group B Strep Culture No results found for: \"GBSANTIGEN\"           External Prenatal Results       Pregnancy Outside Results - Transcribed From Office Records - See Scanned Records For Details       Test Value Date Time    ABO  O  10/06/23 1008    Rh  Positive  10/06/23 1008    Antibody Screen  Negative  " 24 1348       Negative  10/06/23 1008    Varicella IgG       Rubella  3.89 index 10/06/23 1008    Hgb  9.9 g/dL 24 1348       11.3 g/dL 10/06/23 1008    Hct  31.7 % 24 1348       36.6 % 10/06/23 1008    Glucose Fasting GTT       Glucose Tolerance Test 1 hour ^ 135  18     Glucose Tolerance Test 3 hour       Gonorrhea (discrete) ^ Negative  18     Chlamydia (discrete) ^ Negative  18     RPR  Non Reactive  24 1348       Non Reactive  10/06/23 1008    VDRL       Syphilis Antibody       HBsAg  Negative  10/06/23 1008    Herpes Simplex Virus PCR       Herpes Simplex VIrus Culture       HIV  Non Reactive  10/06/23 1008    Hep C RNA Quant PCR       Hep C Antibody  Non Reactive  10/06/23 1008    AFP       Group B Strep  No Group B Streptococcus isolated  24 1127    GBS Susceptibility to Clindamycin       GBS Susceptibility to Erythromycin       Fetal Fibronectin       Genetic Testing, Maternal Blood                 Drug Screening       Test Value Date Time    Urine Drug Screen       Amphetamine Screen  Negative ng/mL 10/06/23 1008    Barbiturate Screen  Negative ng/mL 10/06/23 1008    Benzodiazepine Screen  Negative ng/mL 10/06/23 1008    Methadone Screen  Negative ng/mL 10/06/23 1008    Phencyclidine Screen  Negative ng/mL 10/06/23 1008    Opiates Screen  Negative  18 2311    THC Screen  Negative  18    Cocaine Screen       Propoxyphene Screen  Negative ng/mL 10/06/23 1008    Buprenorphine Screen  Negative  181    Methamphetamine Screen       Oxycodone Screen  Negative  18 2311    Tricyclic Antidepressants Screen  Negative  18              Legend    ^: Historical                              Past OB History:     OB History    Para Term  AB Living   3 1 1 0 1 1   SAB IAB Ectopic Molar Multiple Live Births   1 0 0 0 0 1      # Outcome Date GA Lbr Sesar/2nd Weight Sex Type Anes PTL Lv   3 Current            2 Term 18  39w1d  3735 g (8 lb 3.8 oz) F CS-LTranv EPI N KATHERINE      Complications: Failure to Progress in Second Stage      Name: ALFREDO GARCIA      Apgar1: 8  Apgar5: 9   1 SAB                Past Medical History: Past Medical History:   Diagnosis Date    Abnormal Pap smear of cervix     HPV    Anemia     Anxiety     Depression     Kidney stones     last one     Migraines     Miscarriage 2023    SVT (supraventricular tachycardia) 2008    ablasion at age 13      Past Surgical History Past Surgical History:   Procedure Laterality Date    CARDIAC ABLATION      Hx SVT     SECTION N/A 2018    Procedure:  SECTION PRIMARY;  Surgeon: Caitlin Martinez MD;  Location: [facility] LABOR DELIVERY;  Service: Obstetrics    KIDNEY STONE SURGERY        Family History: Family History   Problem Relation Age of Onset    Diabetes Mother     Hypertension Mother     Mental illness Mother     Migraines Mother     Thyroid disease Mother     Depression Mother     Anxiety disorder Mother     Diabetes Father     Hyperlipidemia Father     Mental illness Father     Asthma Brother     Heart attack Maternal Grandfather     Breast cancer Neg Hx     Ovarian cancer Neg Hx     Uterine cancer Neg Hx     Colon cancer Neg Hx       Social History:  reports that she has never smoked. She has never used smokeless tobacco.   reports no history of alcohol use.   reports no history of drug use.        Review of Systems  Denies fever, HA, CP, Shortness of air, muscle weakness, and rashes      Objective     Vital Signs Range for the last 24 hours  Temperature: Temp:  [98.1 °F (36.7 °C)] 98.1 °F (36.7 °C)   Temp Source: Temp src: Oral   BP: BP: (127)/(85) 127/85   Pulse: Heart Rate:  [97] 97   Respirations: Resp:  [18] 18   SPO2: SpO2:  [97 %] 97 %   O2 Amount (l/min):     O2 Devices Device (Oxygen Therapy): room air   Weight: Weight:  [107 kg (235 lb 12.8 oz)] 107 kg (235 lb 12.8 oz)     Physical Examination: General appearance - alert,  well appearing, and in no distress and oriented to person, place, and time  Chest breathing is unlaboured   Heart - regular rate   Abdomen - soft, nontender, nondistended,   no rebound tenderness noted  No guarding, No RUQ pain  Extremities - pedal edema - none    Presentation: Cephalic                      Fetal Heart Rate Assessment   Method: Fetal HR Assessment Method: external   Beats/min: Fetal HR (beats/min): 130   Baseline: Fetal HR Baseline: normal range   Varibility: Fetal HR Variability: moderate (amplitude range 6 to 25 bpm)   Accels: Fetal HR Accelerations: greater than/equal to 15 bpm, lasting at least 15 seconds   Decels: Fetal HR Decelerations: absent   Tracing Category:       Uterine Assessment   Method: Method: external tocotransducer   Frequency (min): Contraction Frequency (Minutes): none   Ctx Count in 10 min:     Duration:     Intensity:     Intensity by IUPC:     Resting Tone:     Resting Tone by IUPC:     Gregory Units:      NST                     Indication:  Decreased fetal movement   Start time 2150                 End time: 2205  15 x 15 accels x 2  Yes  No decels  Baseline  130s   Reactive NST - Yes     USN:     Cephalic, NBP 8/8     Assessment/Plan  1. Intrauterine pregnancy at 38w1d weeks gestation with reactive fetal status  2.  Decreased fetal movement resolved with RNST and BPP 8/8  3.  Given education and reassurance  4.  Questions answered.   5. D/C home.        Jason Meredith MD  5/9/2024  22:13 EDT

## 2024-05-13 ENCOUNTER — HOSPITAL ENCOUNTER (INPATIENT)
Facility: HOSPITAL | Age: 29
LOS: 3 days | Discharge: HOME | End: 2024-05-17
Attending: OBSTETRICS & GYNECOLOGY | Admitting: OBSTETRICS & GYNECOLOGY
Payer: COMMERCIAL

## 2024-05-13 ENCOUNTER — TELEPHONE (OUTPATIENT)
Dept: OBSTETRICS AND GYNECOLOGY | Facility: CLINIC | Age: 29
End: 2024-05-13
Payer: COMMERCIAL

## 2024-05-13 ENCOUNTER — PREP FOR SURGERY (OUTPATIENT)
Dept: OTHER | Facility: HOSPITAL | Age: 29
End: 2024-05-13
Payer: COMMERCIAL

## 2024-05-13 ENCOUNTER — PRE-ADMISSION TESTING (OUTPATIENT)
Dept: PREADMISSION TESTING | Facility: HOSPITAL | Age: 29
End: 2024-05-13
Payer: COMMERCIAL

## 2024-05-13 VITALS — BODY MASS INDEX: 35.75 KG/M2 | HEIGHT: 68 IN | WEIGHT: 235.89 LBS

## 2024-05-13 DIAGNOSIS — Z98.891 PREVIOUS CESAREAN SECTION: ICD-10-CM

## 2024-05-13 DIAGNOSIS — Z90.49 S/P APPENDECTOMY: Primary | ICD-10-CM

## 2024-05-13 DIAGNOSIS — Z98.891 STATUS POST CESAREAN SECTION: ICD-10-CM

## 2024-05-13 DIAGNOSIS — Z98.891 PREVIOUS CESAREAN SECTION: Primary | ICD-10-CM

## 2024-05-13 LAB
ABO GROUP BLD: NORMAL
ALBUMIN SERPL-MCNC: 3.3 G/DL (ref 3.5–5.2)
ALBUMIN/GLOB SERPL: 1.1 G/DL
ALP SERPL-CCNC: 145 U/L (ref 39–117)
ALT SERPL W P-5'-P-CCNC: 11 U/L (ref 1–33)
ANION GAP SERPL CALCULATED.3IONS-SCNC: 13 MMOL/L (ref 5–15)
AST SERPL-CCNC: 15 U/L (ref 1–32)
BASOPHILS # BLD AUTO: 0.02 10*3/MM3 (ref 0–0.2)
BASOPHILS NFR BLD AUTO: 0.2 % (ref 0–1.5)
BILIRUB SERPL-MCNC: 0.2 MG/DL (ref 0–1.2)
BLD GP AB SCN SERPL QL: NEGATIVE
BUN SERPL-MCNC: 6 MG/DL (ref 6–20)
BUN/CREAT SERPL: 9 (ref 7–25)
CALCIUM SPEC-SCNC: 8.9 MG/DL (ref 8.6–10.5)
CHLORIDE SERPL-SCNC: 103 MMOL/L (ref 98–107)
CO2 SERPL-SCNC: 22 MMOL/L (ref 22–29)
CREAT SERPL-MCNC: 0.67 MG/DL (ref 0.57–1)
CREAT UR-MCNC: 128.3 MG/DL
DEPRECATED RDW RBC AUTO: 52.8 FL (ref 37–54)
EGFRCR SERPLBLD CKD-EPI 2021: 120.8 ML/MIN/1.73
EOSINOPHIL # BLD AUTO: 0.09 10*3/MM3 (ref 0–0.4)
EOSINOPHIL NFR BLD AUTO: 0.8 % (ref 0.3–6.2)
ERYTHROCYTE [DISTWIDTH] IN BLOOD BY AUTOMATED COUNT: 17.4 % (ref 12.3–15.4)
GLOBULIN UR ELPH-MCNC: 2.9 GM/DL
GLUCOSE SERPL-MCNC: 138 MG/DL (ref 65–99)
HCT VFR BLD AUTO: 35.1 % (ref 34–46.6)
HGB BLD-MCNC: 11 G/DL (ref 12–15.9)
IMM GRANULOCYTES # BLD AUTO: 0.08 10*3/MM3 (ref 0–0.05)
IMM GRANULOCYTES NFR BLD AUTO: 0.7 % (ref 0–0.5)
LDH SERPL-CCNC: 152 U/L (ref 135–214)
LYMPHOCYTES # BLD AUTO: 1.75 10*3/MM3 (ref 0.7–3.1)
LYMPHOCYTES NFR BLD AUTO: 14.6 % (ref 19.6–45.3)
MCH RBC QN AUTO: 26.3 PG (ref 26.6–33)
MCHC RBC AUTO-ENTMCNC: 31.3 G/DL (ref 31.5–35.7)
MCV RBC AUTO: 84 FL (ref 79–97)
MONOCYTES # BLD AUTO: 0.42 10*3/MM3 (ref 0.1–0.9)
MONOCYTES NFR BLD AUTO: 3.5 % (ref 5–12)
NEUTROPHILS NFR BLD AUTO: 80.2 % (ref 42.7–76)
NEUTROPHILS NFR BLD AUTO: 9.62 10*3/MM3 (ref 1.7–7)
NRBC BLD AUTO-RTO: 0 /100 WBC (ref 0–0.2)
PLATELET # BLD AUTO: 238 10*3/MM3 (ref 140–450)
PMV BLD AUTO: 10.6 FL (ref 6–12)
POTASSIUM SERPL-SCNC: 3.6 MMOL/L (ref 3.5–5.2)
PROT ?TM UR-MCNC: 15.9 MG/DL
PROT SERPL-MCNC: 6.2 G/DL (ref 6–8.5)
PROT/CREAT UR: 123.9 MG/G CREA (ref 0–200)
RBC # BLD AUTO: 4.18 10*6/MM3 (ref 3.77–5.28)
RH BLD: POSITIVE
SODIUM SERPL-SCNC: 138 MMOL/L (ref 136–145)
T PALLIDUM IGG SER QL: NORMAL
T&S EXPIRATION DATE: NORMAL
URATE SERPL-MCNC: 4.9 MG/DL (ref 2.4–5.7)
WBC NRBC COR # BLD AUTO: 11.98 10*3/MM3 (ref 3.4–10.8)

## 2024-05-13 PROCEDURE — 80053 COMPREHEN METABOLIC PANEL: CPT | Performed by: OBSTETRICS & GYNECOLOGY

## 2024-05-13 PROCEDURE — 86780 TREPONEMA PALLIDUM: CPT | Performed by: OBSTETRICS & GYNECOLOGY

## 2024-05-13 PROCEDURE — 86900 BLOOD TYPING SEROLOGIC ABO: CPT | Performed by: OBSTETRICS & GYNECOLOGY

## 2024-05-13 PROCEDURE — 85025 COMPLETE CBC W/AUTO DIFF WBC: CPT | Performed by: OBSTETRICS & GYNECOLOGY

## 2024-05-13 PROCEDURE — 86850 RBC ANTIBODY SCREEN: CPT | Performed by: OBSTETRICS & GYNECOLOGY

## 2024-05-13 PROCEDURE — 83615 LACTATE (LD) (LDH) ENZYME: CPT | Performed by: OBSTETRICS & GYNECOLOGY

## 2024-05-13 PROCEDURE — 82570 ASSAY OF URINE CREATININE: CPT | Performed by: OBSTETRICS & GYNECOLOGY

## 2024-05-13 PROCEDURE — 86901 BLOOD TYPING SEROLOGIC RH(D): CPT | Performed by: OBSTETRICS & GYNECOLOGY

## 2024-05-13 PROCEDURE — 84550 ASSAY OF BLOOD/URIC ACID: CPT | Performed by: OBSTETRICS & GYNECOLOGY

## 2024-05-13 PROCEDURE — 84156 ASSAY OF PROTEIN URINE: CPT | Performed by: OBSTETRICS & GYNECOLOGY

## 2024-05-13 RX ORDER — SODIUM CHLORIDE 0.9 % (FLUSH) 0.9 %
10 SYRINGE (ML) INJECTION AS NEEDED
Status: CANCELLED | OUTPATIENT
Start: 2024-05-13

## 2024-05-13 RX ORDER — SODIUM CHLORIDE 9 MG/ML
40 INJECTION, SOLUTION INTRAVENOUS AS NEEDED
Status: CANCELLED | OUTPATIENT
Start: 2024-05-13

## 2024-05-13 RX ORDER — OXYTOCIN/0.9 % SODIUM CHLORIDE 30/500 ML
999 PLASTIC BAG, INJECTION (ML) INTRAVENOUS ONCE
Status: CANCELLED | OUTPATIENT
Start: 2024-05-13 | End: 2024-05-13

## 2024-05-13 RX ORDER — CITRIC ACID/SODIUM CITRATE 334-500MG
30 SOLUTION, ORAL ORAL ONCE
Status: CANCELLED | OUTPATIENT
Start: 2024-05-13 | End: 2024-05-13

## 2024-05-13 RX ORDER — METHYLERGONOVINE MALEATE 0.2 MG/ML
200 INJECTION INTRAVENOUS AS NEEDED
Status: CANCELLED | OUTPATIENT
Start: 2024-05-13

## 2024-05-13 RX ORDER — OXYTOCIN/0.9 % SODIUM CHLORIDE 30/500 ML
250 PLASTIC BAG, INJECTION (ML) INTRAVENOUS CONTINUOUS
Status: CANCELLED | OUTPATIENT
Start: 2024-05-13 | End: 2024-05-13

## 2024-05-13 RX ORDER — ACETAMINOPHEN 500 MG
1000 TABLET ORAL ONCE
Status: CANCELLED | OUTPATIENT
Start: 2024-05-13 | End: 2024-05-13

## 2024-05-13 RX ORDER — LIDOCAINE HYDROCHLORIDE 10 MG/ML
0.5 INJECTION, SOLUTION EPIDURAL; INFILTRATION; INTRACAUDAL; PERINEURAL ONCE AS NEEDED
Status: CANCELLED | OUTPATIENT
Start: 2024-05-13

## 2024-05-13 RX ORDER — CARBOPROST TROMETHAMINE 250 UG/ML
250 INJECTION, SOLUTION INTRAMUSCULAR AS NEEDED
Status: CANCELLED | OUTPATIENT
Start: 2024-05-13

## 2024-05-13 RX ORDER — SODIUM CHLORIDE 0.9 % (FLUSH) 0.9 %
10 SYRINGE (ML) INJECTION EVERY 12 HOURS SCHEDULED
Status: CANCELLED | OUTPATIENT
Start: 2024-05-13

## 2024-05-13 RX ORDER — SODIUM CHLORIDE, SODIUM LACTATE, POTASSIUM CHLORIDE, CALCIUM CHLORIDE 600; 310; 30; 20 MG/100ML; MG/100ML; MG/100ML; MG/100ML
125 INJECTION, SOLUTION INTRAVENOUS CONTINUOUS
Status: CANCELLED | OUTPATIENT
Start: 2024-05-13

## 2024-05-13 RX ORDER — BUPIVACAINE HCL/0.9 % NACL/PF 0.1 %
2 PLASTIC BAG, INJECTION (ML) EPIDURAL ONCE
Status: CANCELLED | OUTPATIENT
Start: 2024-05-13 | End: 2024-05-13

## 2024-05-13 RX ORDER — MISOPROSTOL 200 UG/1
800 TABLET ORAL AS NEEDED
Status: CANCELLED | OUTPATIENT
Start: 2024-05-13

## 2024-05-13 RX ORDER — HYDROMORPHONE HYDROCHLORIDE 1 MG/ML
0.5 INJECTION, SOLUTION INTRAMUSCULAR; INTRAVENOUS; SUBCUTANEOUS
Status: CANCELLED | OUTPATIENT
Start: 2024-05-13 | End: 2024-05-23

## 2024-05-13 RX ORDER — KETOROLAC TROMETHAMINE 30 MG/ML
30 INJECTION, SOLUTION INTRAMUSCULAR; INTRAVENOUS ONCE
Status: CANCELLED | OUTPATIENT
Start: 2024-05-13 | End: 2024-05-13

## 2024-05-13 NOTE — PAT
An arrival time for procedure was not provided during PAT visit. If patient had any questions or concerns about their arrival time, they were instructed to contact their surgeon/physician.  Additionally, if the patient referred to an arrival time that was acquired from their my chart account, patient was encouraged to verify that time with their surgeon/physician. Arrival times are NOT provided in Pre Admission Testing Department.    Patient denies any current skin issues.     Patient to apply Chlorhexadine wipes  to surgical area (as instructed) the night before procedure and the AM of procedure. Wipes provided.    Patient instructed to drink 20 ounces of Gatorade or Gatorlyte (if diabetic) and it needs to be completed 1 hour (for Main OR patients) or 2 hours (scheduled  section & [facility] patients) before given arrival time for procedure (NO RED Gatorade and NO Gatorade Zero).    Patient verbalized understanding.    Blood bank bracelet applied to patient during Pre Admission Testing visit.  Patient instructed not to remove from arm until after procedure and they are discharged from the hospital.  Explained to patient that they may shower and get the bracelet wet, but not to immerse under water for longer periods (bathing, swimming, hand dishwashing, etc).  Patient verbalized understanding.    Instructed patient to take two Tylenol extra strength (total of 1000 mg) the night before surgery.

## 2024-05-13 NOTE — TELEPHONE ENCOUNTER
Caller: Soni Zarate    Relationship to patient: Self    Best call back number: [phone]    Patient is needing: PATIENT CALLED AND STATED THAT SOMEONE CALLED HER EARLIER TODAY ABOUT COMING IN FOR AN OB F/U APPT W/ DR DOW TOMORROW - PATIENT ASKED THEM AT THE TIME IF IT WAS NECESSARY FOR HER TO COME IN TOMORROW SINCE SHE JUST HAD PREADMISSION TESTING THIS MORNING AND IS SCHEDULED FOR A  ON WEDNESDAY, BUT PATIENT HASN'T HEARD BACK YET     [facility]

## 2024-05-13 NOTE — DISCHARGE INSTRUCTIONS
What to know before your arrive:    -Do not eat, drink or chew gum beginning 8 hours before your scheduled arrival time to the hospital.  Except please drink 20 ounces of Gatorade and complete two hours before your given arrival time to the hospital.  If you drink too close to surgery time, your sugery may  be delayed or cancelled.  Please complete as instructed.     -Do not shave any part of your body including abdomen or pelvic are for two days before your procedure.  -If you are taking a scheduled medication (insulin, blood pressure medicine,antibiotics) please consult with your physician whether to take on the day of surgery.  -Remove all jewelry including rings, wedding bands, and piercing before coming to the hospital.  -Leave important valuables at home.  -Do not wear dark fingernail polish.  -Please take two Tylenol 500 mg tablets the evening before surgery.  -Bring the following with you to the hospital:    -Picture ID and insurance, Medicare or Medicaid cards    -Co-pay/deductible required by insurance (Cash, Check, Credit Card)    -Copy of living will or power  document (if applicable)    -CPAP mask and tubing, not machine (if applicable)    -Skin prep instructions sheet    What to know the day of procedure:    -Park in the [facility], take elevator for first floor, exit to the right and  proceed through the doors to outside, follow the covered sidewalk to the entrance of the [facility] [place], [facility], enter the [place] to your right BEFORE entering the 17[address] lobby.  Take the elevators to the 3rd floor ([address] [place]).  -Leave unnecessary items in your vehicle, including your suitcase.  Your support  person or a family member can get it for you after your procedure.   -Check in at the reception desk in the lobby of the 3rd floor ([address] [place]).   -One person may accompany you to the pre-op/recovery area.  Please have  other family members wait in the  waiting room.   -An anesthesiologist will meet with your prior to your procedure.   -After anesthesia has been initiated, one person may accompany you in the  operating room.   -No video cameras are permitted in the operating room; only still cameras,  Please.      What to expect while you are in recovery:     -One person may stay with you while you are in recovery.   -If the baby is stable, he/she may visit to initiate breastfeeding & Kangaroo Care.      CHLORHEXIDINE GLUCONATE WIPES AND INSTRUCTIONS GIVEN TO PATIENT

## 2024-05-13 NOTE — TELEPHONE ENCOUNTER
Doesn't look like she has any appt with her, just the c/s on 5/15. I can't find any record of a call, not sure who was calling.

## 2024-05-14 ENCOUNTER — TELEPHONE (OUTPATIENT)
Dept: OBSTETRICS AND GYNECOLOGY | Facility: CLINIC | Age: 29
End: 2024-05-14

## 2024-05-14 ENCOUNTER — ANESTHESIA (OUTPATIENT)
Dept: LABOR AND DELIVERY | Facility: HOSPITAL | Age: 29
End: 2024-05-14
Payer: COMMERCIAL

## 2024-05-14 ENCOUNTER — APPOINTMENT (OUTPATIENT)
Dept: CT IMAGING | Facility: HOSPITAL | Age: 29
End: 2024-05-14
Payer: COMMERCIAL

## 2024-05-14 ENCOUNTER — ANESTHESIA EVENT (OUTPATIENT)
Dept: LABOR AND DELIVERY | Facility: HOSPITAL | Age: 29
End: 2024-05-14
Payer: COMMERCIAL

## 2024-05-14 PROBLEM — R10.9 ACUTE ABDOMINAL PAIN: Status: ACTIVE | Noted: 2024-05-14

## 2024-05-14 LAB
ALBUMIN SERPL-MCNC: 3.5 G/DL (ref 3.5–5.2)
ALBUMIN/GLOB SERPL: 1.2 G/DL
ALP SERPL-CCNC: 149 U/L (ref 39–117)
ALP SERPL-CCNC: 152 U/L (ref 39–117)
ALT SERPL W P-5'-P-CCNC: 13 U/L (ref 1–33)
ALT SERPL W P-5'-P-CCNC: 13 U/L (ref 1–33)
AMYLASE SERPL-CCNC: 76 U/L (ref 28–100)
ANION GAP SERPL CALCULATED.3IONS-SCNC: 12 MMOL/L (ref 5–15)
APTT PPP: 27.1 SECONDS (ref 22–39)
AST SERPL-CCNC: 18 U/L (ref 1–32)
AST SERPL-CCNC: 19 U/L (ref 1–32)
ATMOSPHERIC PRESS: ABNORMAL MM[HG]
ATMOSPHERIC PRESS: ABNORMAL MM[HG]
BASE EXCESS BLDCOA CALC-SCNC: -2.1 MMOL/L (ref 0–2)
BASE EXCESS BLDCOV CALC-SCNC: -1.2 MMOL/L (ref 0–2)
BASOPHILS # BLD AUTO: 0.02 10*3/MM3 (ref 0–0.2)
BASOPHILS # BLD AUTO: 0.04 10*3/MM3 (ref 0–0.2)
BASOPHILS NFR BLD AUTO: 0.1 % (ref 0–1.5)
BASOPHILS NFR BLD AUTO: 0.2 % (ref 0–1.5)
BDY SITE: ABNORMAL
BDY SITE: ABNORMAL
BILIRUB SERPL-MCNC: 0.2 MG/DL (ref 0–1.2)
BILIRUB SERPL-MCNC: 0.2 MG/DL (ref 0–1.2)
BODY TEMPERATURE: 37
BODY TEMPERATURE: 37
BUN SERPL-MCNC: 7 MG/DL (ref 6–20)
BUN/CREAT SERPL: 9.5 (ref 7–25)
CALCIUM SPEC-SCNC: 9.9 MG/DL (ref 8.6–10.5)
CHLORIDE SERPL-SCNC: 106 MMOL/L (ref 98–107)
CO2 BLDA-SCNC: 26.5 MMOL/L (ref 22–33)
CO2 BLDA-SCNC: 27 MMOL/L (ref 22–33)
CO2 SERPL-SCNC: 23 MMOL/L (ref 22–29)
CREAT SERPL-MCNC: 0.74 MG/DL (ref 0.57–1)
CREAT SERPL-MCNC: 0.74 MG/DL (ref 0.57–1)
D-LACTATE SERPL-SCNC: 1.2 MMOL/L (ref 0.5–2)
DEPRECATED RDW RBC AUTO: 50.3 FL (ref 37–54)
DEPRECATED RDW RBC AUTO: 51.8 FL (ref 37–54)
EGFRCR SERPLBLD CKD-EPI 2021: 111.8 ML/MIN/1.73
EOSINOPHIL # BLD AUTO: 0 10*3/MM3 (ref 0–0.4)
EOSINOPHIL # BLD AUTO: 0.06 10*3/MM3 (ref 0–0.4)
EOSINOPHIL NFR BLD AUTO: 0 % (ref 0.3–6.2)
EOSINOPHIL NFR BLD AUTO: 0.2 % (ref 0.3–6.2)
EPAP: 0
EPAP: 0
ERYTHROCYTE [DISTWIDTH] IN BLOOD BY AUTOMATED COUNT: 17.2 % (ref 12.3–15.4)
ERYTHROCYTE [DISTWIDTH] IN BLOOD BY AUTOMATED COUNT: 17.2 % (ref 12.3–15.4)
FIBRINOGEN PPP-MCNC: 476 MG/DL (ref 203–470)
GLOBULIN UR ELPH-MCNC: 2.9 GM/DL
GLUCOSE SERPL-MCNC: 102 MG/DL (ref 65–99)
HCO3 BLDCOA-SCNC: 25 MMOL/L (ref 16.9–20.5)
HCO3 BLDCOV-SCNC: 25.5 MMOL/L (ref 18.6–21.4)
HCT VFR BLD AUTO: 28.7 % (ref 34–46.6)
HCT VFR BLD AUTO: 35.7 % (ref 34–46.6)
HGB BLD-MCNC: 11.5 G/DL (ref 12–15.9)
HGB BLD-MCNC: 9 G/DL (ref 12–15.9)
HGB BLDA-MCNC: 16.8 G/DL (ref 14–18)
HGB BLDA-MCNC: 17 G/DL (ref 14–18)
IMM GRANULOCYTES # BLD AUTO: 0.12 10*3/MM3 (ref 0–0.05)
IMM GRANULOCYTES # BLD AUTO: 0.16 10*3/MM3 (ref 0–0.05)
IMM GRANULOCYTES NFR BLD AUTO: 0.6 % (ref 0–0.5)
IMM GRANULOCYTES NFR BLD AUTO: 0.6 % (ref 0–0.5)
INHALED O2 CONCENTRATION: 21 %
INHALED O2 CONCENTRATION: 21 %
INR PPP: 1.03 (ref 0.89–1.12)
IPAP: 0
IPAP: 0
LDH SERPL-CCNC: 648 U/L (ref 135–214)
LIPASE SERPL-CCNC: 25 U/L (ref 13–60)
LYMPHOCYTES # BLD AUTO: 1.04 10*3/MM3 (ref 0.7–3.1)
LYMPHOCYTES # BLD AUTO: 1.71 10*3/MM3 (ref 0.7–3.1)
LYMPHOCYTES NFR BLD AUTO: 5 % (ref 19.6–45.3)
LYMPHOCYTES NFR BLD AUTO: 6.6 % (ref 19.6–45.3)
MCH RBC QN AUTO: 25.9 PG (ref 26.6–33)
MCH RBC QN AUTO: 26.3 PG (ref 26.6–33)
MCHC RBC AUTO-ENTMCNC: 31.4 G/DL (ref 31.5–35.7)
MCHC RBC AUTO-ENTMCNC: 32.2 G/DL (ref 31.5–35.7)
MCV RBC AUTO: 81.7 FL (ref 79–97)
MCV RBC AUTO: 82.7 FL (ref 79–97)
MODALITY: ABNORMAL
MODALITY: ABNORMAL
MONOCYTES # BLD AUTO: 0.76 10*3/MM3 (ref 0.1–0.9)
MONOCYTES # BLD AUTO: 0.85 10*3/MM3 (ref 0.1–0.9)
MONOCYTES NFR BLD AUTO: 3.3 % (ref 5–12)
MONOCYTES NFR BLD AUTO: 3.7 % (ref 5–12)
NEUTROPHILS NFR BLD AUTO: 18.88 10*3/MM3 (ref 1.7–7)
NEUTROPHILS NFR BLD AUTO: 22.99 10*3/MM3 (ref 1.7–7)
NEUTROPHILS NFR BLD AUTO: 89.1 % (ref 42.7–76)
NEUTROPHILS NFR BLD AUTO: 90.6 % (ref 42.7–76)
NOTE: 0
NRBC BLD AUTO-RTO: 0 /100 WBC (ref 0–0.2)
NRBC BLD AUTO-RTO: 0 /100 WBC (ref 0–0.2)
PAW @ PEAK INSP FLOW SETTING VENT: 0 CMH2O
PAW @ PEAK INSP FLOW SETTING VENT: 0 CMH2O
PCO2 BLDCOA: 49.8 MMHG (ref 43.3–54.9)
PCO2 BLDCOV: 48.6 MM HG (ref 28–40)
PH BLDCOA: 7.31 PH UNITS (ref 7.22–7.3)
PH BLDCOV: 7.33 PH UNITS (ref 7.31–7.37)
PLATELET # BLD AUTO: 197 10*3/MM3 (ref 140–450)
PLATELET # BLD AUTO: 235 10*3/MM3 (ref 140–450)
PMV BLD AUTO: 10.5 FL (ref 6–12)
PMV BLD AUTO: 10.9 FL (ref 6–12)
PO2 BLDCOA: 22.5 MMHG (ref 11.5–43.3)
PO2 BLDCOV: 18.1 MM HG (ref 21–31)
POTASSIUM SERPL-SCNC: 4.3 MMOL/L (ref 3.5–5.2)
PROCALCITONIN SERPL-MCNC: 0.05 NG/ML (ref 0–0.25)
PROT SERPL-MCNC: 6.4 G/DL (ref 6–8.5)
PROTHROMBIN TIME: 13.6 SECONDS (ref 12.2–14.5)
RBC # BLD AUTO: 3.47 10*6/MM3 (ref 3.77–5.28)
RBC # BLD AUTO: 4.37 10*6/MM3 (ref 3.77–5.28)
SAO2 % BLDCOA: 46.5 %
SAO2 % BLDCOA: ABNORMAL %
SAO2 % BLDCOV: 36.2 %
SODIUM SERPL-SCNC: 141 MMOL/L (ref 136–145)
TOTAL RATE: 0 BREATHS/MINUTE
TOTAL RATE: 0 BREATHS/MINUTE
URATE SERPL-MCNC: 4.7 MG/DL (ref 2.4–5.7)
VENTILATOR MODE: ABNORMAL
VENTILATOR MODE: ABNORMAL
WBC NRBC COR # BLD AUTO: 20.82 10*3/MM3 (ref 3.4–10.8)
WBC NRBC COR # BLD AUTO: 25.81 10*3/MM3 (ref 3.4–10.8)

## 2024-05-14 PROCEDURE — 85610 PROTHROMBIN TIME: CPT | Performed by: OBSTETRICS & GYNECOLOGY

## 2024-05-14 PROCEDURE — 25010000002 HYDROXYZINE PER 25 MG: Performed by: ANESTHESIOLOGY

## 2024-05-14 PROCEDURE — 85025 COMPLETE CBC W/AUTO DIFF WBC: CPT | Performed by: OBSTETRICS & GYNECOLOGY

## 2024-05-14 PROCEDURE — 63710000001 DIPHENHYDRAMINE PER 50 MG: Performed by: OBSTETRICS & GYNECOLOGY

## 2024-05-14 PROCEDURE — 25010000002 KETOROLAC TROMETHAMINE PER 15 MG: Performed by: OBSTETRICS & GYNECOLOGY

## 2024-05-14 PROCEDURE — 25010000002 FENTANYL CITRATE (PF) 100 MCG/2ML SOLUTION: Performed by: ANESTHESIOLOGY

## 2024-05-14 PROCEDURE — 83605 ASSAY OF LACTIC ACID: CPT | Performed by: OBSTETRICS & GYNECOLOGY

## 2024-05-14 PROCEDURE — 83615 LACTATE (LD) (LDH) ENZYME: CPT | Performed by: OBSTETRICS & GYNECOLOGY

## 2024-05-14 PROCEDURE — 88304 TISSUE EXAM BY PATHOLOGIST: CPT | Performed by: OBSTETRICS & GYNECOLOGY

## 2024-05-14 PROCEDURE — 25010000002 HYDROMORPHONE 1 MG/ML SOLUTION: Performed by: OBSTETRICS & GYNECOLOGY

## 2024-05-14 PROCEDURE — 25010000002 ONDANSETRON PER 1 MG: Performed by: ANESTHESIOLOGY

## 2024-05-14 PROCEDURE — 83690 ASSAY OF LIPASE: CPT | Performed by: OBSTETRICS & GYNECOLOGY

## 2024-05-14 PROCEDURE — 80053 COMPREHEN METABOLIC PANEL: CPT | Performed by: OBSTETRICS & GYNECOLOGY

## 2024-05-14 PROCEDURE — 25010000002 HYDROMORPHONE PER 4 MG: Performed by: OBSTETRICS & GYNECOLOGY

## 2024-05-14 PROCEDURE — 0DTJ0ZZ RESECTION OF APPENDIX, OPEN APPROACH: ICD-10-PCS | Performed by: SURGERY

## 2024-05-14 PROCEDURE — 25010000002 MORPHINE PER 10 MG: Performed by: ANESTHESIOLOGY

## 2024-05-14 PROCEDURE — 82805 BLOOD GASES W/O2 SATURATION: CPT

## 2024-05-14 PROCEDURE — 84145 PROCALCITONIN (PCT): CPT | Performed by: OBSTETRICS & GYNECOLOGY

## 2024-05-14 PROCEDURE — 25810000003 LACTATED RINGERS SOLUTION: Performed by: OBSTETRICS & GYNECOLOGY

## 2024-05-14 PROCEDURE — 59514 CESAREAN DELIVERY ONLY: CPT | Performed by: OBSTETRICS & GYNECOLOGY

## 2024-05-14 PROCEDURE — 25010000002 CEFAZOLIN PER 500 MG: Performed by: OBSTETRICS & GYNECOLOGY

## 2024-05-14 PROCEDURE — 25010000002 METOCLOPRAMIDE PER 10 MG: Performed by: ANESTHESIOLOGY

## 2024-05-14 PROCEDURE — 25010000002 OXYTOCIN PER 10 UNITS: Performed by: ANESTHESIOLOGY

## 2024-05-14 PROCEDURE — 25810000003 LACTATED RINGERS PER 1000 ML: Performed by: OBSTETRICS & GYNECOLOGY

## 2024-05-14 PROCEDURE — 84075 ASSAY ALKALINE PHOSPHATASE: CPT | Performed by: OBSTETRICS & GYNECOLOGY

## 2024-05-14 PROCEDURE — C1765 ADHESION BARRIER: HCPCS | Performed by: OBSTETRICS & GYNECOLOGY

## 2024-05-14 PROCEDURE — 82565 ASSAY OF CREATININE: CPT | Performed by: OBSTETRICS & GYNECOLOGY

## 2024-05-14 PROCEDURE — 82247 BILIRUBIN TOTAL: CPT | Performed by: OBSTETRICS & GYNECOLOGY

## 2024-05-14 PROCEDURE — 25010000002 ONDANSETRON PER 1 MG: Performed by: OBSTETRICS & GYNECOLOGY

## 2024-05-14 PROCEDURE — S0260 H&P FOR SURGERY: HCPCS | Performed by: OBSTETRICS & GYNECOLOGY

## 2024-05-14 PROCEDURE — 88307 TISSUE EXAM BY PATHOLOGIST: CPT | Performed by: OBSTETRICS & GYNECOLOGY

## 2024-05-14 PROCEDURE — 25010000002 BUPIVACAINE PF 0.75 % SOLUTION: Performed by: ANESTHESIOLOGY

## 2024-05-14 PROCEDURE — 85384 FIBRINOGEN ACTIVITY: CPT | Performed by: OBSTETRICS & GYNECOLOGY

## 2024-05-14 PROCEDURE — 25010000002 MIDAZOLAM PER 1 MG: Performed by: ANESTHESIOLOGY

## 2024-05-14 PROCEDURE — 84550 ASSAY OF BLOOD/URIC ACID: CPT | Performed by: OBSTETRICS & GYNECOLOGY

## 2024-05-14 PROCEDURE — 82150 ASSAY OF AMYLASE: CPT | Performed by: OBSTETRICS & GYNECOLOGY

## 2024-05-14 PROCEDURE — 59515 CESAREAN DELIVERY: CPT | Performed by: OBSTETRICS & GYNECOLOGY

## 2024-05-14 PROCEDURE — 85730 THROMBOPLASTIN TIME PARTIAL: CPT | Performed by: OBSTETRICS & GYNECOLOGY

## 2024-05-14 PROCEDURE — 74176 CT ABD & PELVIS W/O CONTRAST: CPT

## 2024-05-14 DEVICE — ABSORBABLE ADHESION BARRIER
Type: IMPLANTABLE DEVICE | Site: ABDOMEN | Status: FUNCTIONAL
Brand: GYNECARE INTERCEED

## 2024-05-14 RX ORDER — KETOROLAC TROMETHAMINE 30 MG/ML
30 INJECTION, SOLUTION INTRAMUSCULAR; INTRAVENOUS ONCE
Status: COMPLETED | OUTPATIENT
Start: 2024-05-14 | End: 2024-05-14

## 2024-05-14 RX ORDER — HYDROMORPHONE HYDROCHLORIDE 1 MG/ML
0.5 INJECTION, SOLUTION INTRAMUSCULAR; INTRAVENOUS; SUBCUTANEOUS
Status: DISCONTINUED | OUTPATIENT
Start: 2024-05-14 | End: 2024-05-14 | Stop reason: HOSPADM

## 2024-05-14 RX ORDER — SODIUM CHLORIDE 0.9 % (FLUSH) 0.9 %
10 SYRINGE (ML) INJECTION AS NEEDED
Status: DISCONTINUED | OUTPATIENT
Start: 2024-05-14 | End: 2024-05-14 | Stop reason: HOSPADM

## 2024-05-14 RX ORDER — OXYTOCIN/0.9 % SODIUM CHLORIDE 30/500 ML
PLASTIC BAG, INJECTION (ML) INTRAVENOUS AS NEEDED
Status: DISCONTINUED | OUTPATIENT
Start: 2024-05-14 | End: 2024-05-14 | Stop reason: SURG

## 2024-05-14 RX ORDER — PRENATAL VIT/IRON FUM/FOLIC AC 27MG-0.8MG
1 TABLET ORAL DAILY
Status: DISCONTINUED | OUTPATIENT
Start: 2024-05-14 | End: 2024-05-17 | Stop reason: HOSPADM

## 2024-05-14 RX ORDER — SIMETHICONE 80 MG
80 TABLET,CHEWABLE ORAL 4 TIMES DAILY PRN
Status: DISCONTINUED | OUTPATIENT
Start: 2024-05-14 | End: 2024-05-17 | Stop reason: HOSPADM

## 2024-05-14 RX ORDER — SODIUM CHLORIDE 0.9 % (FLUSH) 0.9 %
10 SYRINGE (ML) INJECTION EVERY 12 HOURS SCHEDULED
Status: DISCONTINUED | OUTPATIENT
Start: 2024-05-14 | End: 2024-05-17 | Stop reason: HOSPADM

## 2024-05-14 RX ORDER — METHYLERGONOVINE MALEATE 0.2 MG/ML
200 INJECTION INTRAVENOUS AS NEEDED
Status: DISCONTINUED | OUTPATIENT
Start: 2024-05-14 | End: 2024-05-17 | Stop reason: HOSPADM

## 2024-05-14 RX ORDER — ACETAMINOPHEN 325 MG/1
650 TABLET ORAL EVERY 6 HOURS
Status: DISCONTINUED | OUTPATIENT
Start: 2024-05-15 | End: 2024-05-17 | Stop reason: HOSPADM

## 2024-05-14 RX ORDER — ACETAMINOPHEN 500 MG
1000 TABLET ORAL EVERY 6 HOURS
Status: COMPLETED | OUTPATIENT
Start: 2024-05-14 | End: 2024-05-15

## 2024-05-14 RX ORDER — CITRIC ACID/SODIUM CITRATE 334-500MG
30 SOLUTION, ORAL ORAL ONCE
Status: COMPLETED | OUTPATIENT
Start: 2024-05-14 | End: 2024-05-14

## 2024-05-14 RX ORDER — FENTANYL CITRATE 50 UG/ML
INJECTION, SOLUTION INTRAMUSCULAR; INTRAVENOUS AS NEEDED
Status: DISCONTINUED | OUTPATIENT
Start: 2024-05-14 | End: 2024-05-14 | Stop reason: SURG

## 2024-05-14 RX ORDER — METOCLOPRAMIDE HYDROCHLORIDE 5 MG/ML
INJECTION INTRAMUSCULAR; INTRAVENOUS AS NEEDED
Status: DISCONTINUED | OUTPATIENT
Start: 2024-05-14 | End: 2024-05-14 | Stop reason: SURG

## 2024-05-14 RX ORDER — MISOPROSTOL 200 UG/1
600 TABLET ORAL AS NEEDED
Status: DISCONTINUED | OUTPATIENT
Start: 2024-05-14 | End: 2024-05-17 | Stop reason: HOSPADM

## 2024-05-14 RX ORDER — OXYTOCIN/0.9 % SODIUM CHLORIDE 30/500 ML
250 PLASTIC BAG, INJECTION (ML) INTRAVENOUS CONTINUOUS
Status: DISCONTINUED | OUTPATIENT
Start: 2024-05-14 | End: 2024-05-14

## 2024-05-14 RX ORDER — OXYTOCIN/0.9 % SODIUM CHLORIDE 30/500 ML
999 PLASTIC BAG, INJECTION (ML) INTRAVENOUS ONCE
Status: DISCONTINUED | OUTPATIENT
Start: 2024-05-14 | End: 2024-05-17 | Stop reason: HOSPADM

## 2024-05-14 RX ORDER — CARBOPROST TROMETHAMINE 250 UG/ML
250 INJECTION, SOLUTION INTRAMUSCULAR AS NEEDED
Status: DISCONTINUED | OUTPATIENT
Start: 2024-05-14 | End: 2024-05-17 | Stop reason: HOSPADM

## 2024-05-14 RX ORDER — LIDOCAINE HYDROCHLORIDE 10 MG/ML
0.5 INJECTION, SOLUTION EPIDURAL; INFILTRATION; INTRACAUDAL; PERINEURAL ONCE AS NEEDED
Status: DISCONTINUED | OUTPATIENT
Start: 2024-05-14 | End: 2024-05-14 | Stop reason: HOSPADM

## 2024-05-14 RX ORDER — PROMETHAZINE HYDROCHLORIDE 25 MG/1
25 TABLET ORAL EVERY 6 HOURS PRN
Status: DISCONTINUED | OUTPATIENT
Start: 2024-05-14 | End: 2024-05-17 | Stop reason: HOSPADM

## 2024-05-14 RX ORDER — SODIUM CHLORIDE 9 MG/ML
40 INJECTION, SOLUTION INTRAVENOUS AS NEEDED
Status: DISCONTINUED | OUTPATIENT
Start: 2024-05-14 | End: 2024-05-17 | Stop reason: HOSPADM

## 2024-05-14 RX ORDER — OXYTOCIN/0.9 % SODIUM CHLORIDE 30/500 ML
999 PLASTIC BAG, INJECTION (ML) INTRAVENOUS ONCE
Status: DISCONTINUED | OUTPATIENT
Start: 2024-05-14 | End: 2024-05-14

## 2024-05-14 RX ORDER — OXYCODONE HYDROCHLORIDE 10 MG/1
10 TABLET ORAL EVERY 4 HOURS PRN
Status: DISCONTINUED | OUTPATIENT
Start: 2024-05-14 | End: 2024-05-17 | Stop reason: HOSPADM

## 2024-05-14 RX ORDER — HYDROCORTISONE 25 MG/G
1 CREAM TOPICAL AS NEEDED
Status: DISCONTINUED | OUTPATIENT
Start: 2024-05-14 | End: 2024-05-17 | Stop reason: HOSPADM

## 2024-05-14 RX ORDER — OXYTOCIN/0.9 % SODIUM CHLORIDE 30/500 ML
125 PLASTIC BAG, INJECTION (ML) INTRAVENOUS ONCE AS NEEDED
Status: DISCONTINUED | OUTPATIENT
Start: 2024-05-14 | End: 2024-05-17 | Stop reason: HOSPADM

## 2024-05-14 RX ORDER — SODIUM CHLORIDE 9 MG/ML
40 INJECTION, SOLUTION INTRAVENOUS AS NEEDED
Status: DISCONTINUED | OUTPATIENT
Start: 2024-05-14 | End: 2024-05-14 | Stop reason: HOSPADM

## 2024-05-14 RX ORDER — MISOPROSTOL 200 UG/1
800 TABLET ORAL ONCE AS NEEDED
Status: DISCONTINUED | OUTPATIENT
Start: 2024-05-14 | End: 2024-05-14 | Stop reason: HOSPADM

## 2024-05-14 RX ORDER — HYDROMORPHONE HYDROCHLORIDE 1 MG/ML
0.5 INJECTION, SOLUTION INTRAMUSCULAR; INTRAVENOUS; SUBCUTANEOUS
Status: DISCONTINUED | OUTPATIENT
Start: 2024-05-14 | End: 2024-05-14

## 2024-05-14 RX ORDER — PROMETHAZINE HYDROCHLORIDE 12.5 MG/1
12.5 SUPPOSITORY RECTAL EVERY 6 HOURS PRN
Status: DISCONTINUED | OUTPATIENT
Start: 2024-05-14 | End: 2024-05-17 | Stop reason: HOSPADM

## 2024-05-14 RX ORDER — CARBOPROST TROMETHAMINE 250 UG/ML
250 INJECTION, SOLUTION INTRAMUSCULAR AS NEEDED
Status: DISCONTINUED | OUTPATIENT
Start: 2024-05-14 | End: 2024-05-14 | Stop reason: HOSPADM

## 2024-05-14 RX ORDER — OXYCODONE HYDROCHLORIDE 5 MG/1
5 TABLET ORAL EVERY 4 HOURS PRN
Status: DISCONTINUED | OUTPATIENT
Start: 2024-05-14 | End: 2024-05-17 | Stop reason: HOSPADM

## 2024-05-14 RX ORDER — ENOXAPARIN SODIUM 100 MG/ML
40 INJECTION SUBCUTANEOUS EVERY 24 HOURS
Status: DISCONTINUED | OUTPATIENT
Start: 2024-05-15 | End: 2024-05-17 | Stop reason: HOSPADM

## 2024-05-14 RX ORDER — KETOROLAC TROMETHAMINE 30 MG/ML
30 INJECTION, SOLUTION INTRAMUSCULAR; INTRAVENOUS ONCE
Status: DISCONTINUED | OUTPATIENT
Start: 2024-05-14 | End: 2024-05-14

## 2024-05-14 RX ORDER — MIDAZOLAM HYDROCHLORIDE 1 MG/ML
INJECTION INTRAMUSCULAR; INTRAVENOUS AS NEEDED
Status: DISCONTINUED | OUTPATIENT
Start: 2024-05-14 | End: 2024-05-14 | Stop reason: SURG

## 2024-05-14 RX ORDER — ACETAMINOPHEN 500 MG
1000 TABLET ORAL ONCE
Status: COMPLETED | OUTPATIENT
Start: 2024-05-14 | End: 2024-05-14

## 2024-05-14 RX ORDER — ALUMINA, MAGNESIA, AND SIMETHICONE 2400; 2400; 240 MG/30ML; MG/30ML; MG/30ML
15 SUSPENSION ORAL EVERY 4 HOURS PRN
Status: DISCONTINUED | OUTPATIENT
Start: 2024-05-14 | End: 2024-05-17 | Stop reason: HOSPADM

## 2024-05-14 RX ORDER — ONDANSETRON 2 MG/ML
INJECTION INTRAMUSCULAR; INTRAVENOUS AS NEEDED
Status: DISCONTINUED | OUTPATIENT
Start: 2024-05-14 | End: 2024-05-14 | Stop reason: SURG

## 2024-05-14 RX ORDER — IBUPROFEN 600 MG/1
600 TABLET ORAL EVERY 6 HOURS
Status: DISCONTINUED | OUTPATIENT
Start: 2024-05-15 | End: 2024-05-17 | Stop reason: HOSPADM

## 2024-05-14 RX ORDER — SODIUM CHLORIDE, SODIUM LACTATE, POTASSIUM CHLORIDE, CALCIUM CHLORIDE 600; 310; 30; 20 MG/100ML; MG/100ML; MG/100ML; MG/100ML
125 INJECTION, SOLUTION INTRAVENOUS CONTINUOUS
Status: DISCONTINUED | OUTPATIENT
Start: 2024-05-14 | End: 2024-05-14

## 2024-05-14 RX ORDER — LIDOCAINE HCL/EPINEPHRINE/PF 2%-1:200K
VIAL (ML) INJECTION AS NEEDED
Status: DISCONTINUED | OUTPATIENT
Start: 2024-05-14 | End: 2024-05-14 | Stop reason: SURG

## 2024-05-14 RX ORDER — CALCIUM CARBONATE 500 MG/1
1 TABLET, CHEWABLE ORAL EVERY 4 HOURS PRN
Status: DISCONTINUED | OUTPATIENT
Start: 2024-05-14 | End: 2024-05-17 | Stop reason: HOSPADM

## 2024-05-14 RX ORDER — METHYLERGONOVINE MALEATE 0.2 MG/ML
200 INJECTION INTRAVENOUS AS NEEDED
Status: DISCONTINUED | OUTPATIENT
Start: 2024-05-14 | End: 2024-05-14 | Stop reason: HOSPADM

## 2024-05-14 RX ORDER — OXYTOCIN 10 [USP'U]/ML
INJECTION, SOLUTION INTRAMUSCULAR; INTRAVENOUS AS NEEDED
Status: DISCONTINUED | OUTPATIENT
Start: 2024-05-14 | End: 2024-05-14 | Stop reason: SURG

## 2024-05-14 RX ORDER — SODIUM CHLORIDE 0.9 % (FLUSH) 0.9 %
10 SYRINGE (ML) INJECTION EVERY 12 HOURS SCHEDULED
Status: DISCONTINUED | OUTPATIENT
Start: 2024-05-14 | End: 2024-05-14 | Stop reason: HOSPADM

## 2024-05-14 RX ORDER — BUPIVACAINE HYDROCHLORIDE 7.5 MG/ML
INJECTION, SOLUTION EPIDURAL; RETROBULBAR AS NEEDED
Status: DISCONTINUED | OUTPATIENT
Start: 2024-05-14 | End: 2024-05-14 | Stop reason: SURG

## 2024-05-14 RX ORDER — HYDROXYZINE HYDROCHLORIDE 50 MG/ML
INJECTION, SOLUTION INTRAMUSCULAR AS NEEDED
Status: DISCONTINUED | OUTPATIENT
Start: 2024-05-14 | End: 2024-05-14 | Stop reason: SURG

## 2024-05-14 RX ORDER — ONDANSETRON 4 MG/1
4 TABLET, ORALLY DISINTEGRATING ORAL EVERY 8 HOURS PRN
Status: DISCONTINUED | OUTPATIENT
Start: 2024-05-14 | End: 2024-05-17 | Stop reason: HOSPADM

## 2024-05-14 RX ORDER — SODIUM CHLORIDE 0.9 % (FLUSH) 0.9 %
1-10 SYRINGE (ML) INJECTION AS NEEDED
Status: DISCONTINUED | OUTPATIENT
Start: 2024-05-14 | End: 2024-05-17 | Stop reason: HOSPADM

## 2024-05-14 RX ORDER — ONDANSETRON 2 MG/ML
4 INJECTION INTRAMUSCULAR; INTRAVENOUS EVERY 6 HOURS PRN
Status: DISCONTINUED | OUTPATIENT
Start: 2024-05-14 | End: 2024-05-14

## 2024-05-14 RX ORDER — MORPHINE SULFATE 0.5 MG/ML
INJECTION, SOLUTION EPIDURAL; INTRATHECAL; INTRAVENOUS AS NEEDED
Status: DISCONTINUED | OUTPATIENT
Start: 2024-05-14 | End: 2024-05-14 | Stop reason: SURG

## 2024-05-14 RX ORDER — FAMOTIDINE 10 MG/ML
INJECTION, SOLUTION INTRAVENOUS AS NEEDED
Status: DISCONTINUED | OUTPATIENT
Start: 2024-05-14 | End: 2024-05-14 | Stop reason: SURG

## 2024-05-14 RX ORDER — OXYTOCIN/0.9 % SODIUM CHLORIDE 30/500 ML
250 PLASTIC BAG, INJECTION (ML) INTRAVENOUS CONTINUOUS
Status: ACTIVE | OUTPATIENT
Start: 2024-05-14 | End: 2024-05-14

## 2024-05-14 RX ORDER — HYDROMORPHONE HYDROCHLORIDE 1 MG/ML
0.5 INJECTION, SOLUTION INTRAMUSCULAR; INTRAVENOUS; SUBCUTANEOUS ONCE
Status: COMPLETED | OUTPATIENT
Start: 2024-05-14 | End: 2024-05-14

## 2024-05-14 RX ORDER — CARBOPROST TROMETHAMINE 250 UG/ML
250 INJECTION, SOLUTION INTRAMUSCULAR
Status: DISCONTINUED | OUTPATIENT
Start: 2024-05-14 | End: 2024-05-14 | Stop reason: HOSPADM

## 2024-05-14 RX ORDER — DOCUSATE SODIUM 100 MG/1
100 CAPSULE, LIQUID FILLED ORAL 2 TIMES DAILY PRN
Status: DISCONTINUED | OUTPATIENT
Start: 2024-05-14 | End: 2024-05-17 | Stop reason: HOSPADM

## 2024-05-14 RX ORDER — DIPHENHYDRAMINE HCL 25 MG
25 CAPSULE ORAL EVERY 4 HOURS PRN
Status: DISCONTINUED | OUTPATIENT
Start: 2024-05-14 | End: 2024-05-17 | Stop reason: HOSPADM

## 2024-05-14 RX ORDER — MISOPROSTOL 200 UG/1
800 TABLET ORAL AS NEEDED
Status: DISCONTINUED | OUTPATIENT
Start: 2024-05-14 | End: 2024-05-14 | Stop reason: HOSPADM

## 2024-05-14 RX ORDER — KETOROLAC TROMETHAMINE 15 MG/ML
15 INJECTION, SOLUTION INTRAMUSCULAR; INTRAVENOUS EVERY 6 HOURS
Status: COMPLETED | OUTPATIENT
Start: 2024-05-14 | End: 2024-05-15

## 2024-05-14 RX ADMIN — ONDANSETRON 4 MG: 2 INJECTION INTRAMUSCULAR; INTRAVENOUS at 02:04

## 2024-05-14 RX ADMIN — SODIUM CHLORIDE 2000 MG: 900 INJECTION INTRAVENOUS at 02:36

## 2024-05-14 RX ADMIN — MIDAZOLAM HYDROCHLORIDE 1 MG: 1 INJECTION, SOLUTION INTRAMUSCULAR; INTRAVENOUS at 04:06

## 2024-05-14 RX ADMIN — FENTANYL CITRATE 30 MCG: 50 INJECTION, SOLUTION INTRAMUSCULAR; INTRAVENOUS at 03:22

## 2024-05-14 RX ADMIN — SODIUM CHLORIDE, POTASSIUM CHLORIDE, SODIUM LACTATE AND CALCIUM CHLORIDE 125 ML/HR: 600; 310; 30; 20 INJECTION, SOLUTION INTRAVENOUS at 00:15

## 2024-05-14 RX ADMIN — ONDANSETRON 4 MG: 2 INJECTION INTRAMUSCULAR; INTRAVENOUS at 02:57

## 2024-05-14 RX ADMIN — DIPHENHYDRAMINE HYDROCHLORIDE 25 MG: 25 CAPSULE ORAL at 08:33

## 2024-05-14 RX ADMIN — LIDOCAINE HYDROCHLORIDE,EPINEPHRINE BITARTRATE 5 ML: 20; .005 INJECTION, SOLUTION EPIDURAL; INFILTRATION; INTRACAUDAL; PERINEURAL at 04:00

## 2024-05-14 RX ADMIN — LIDOCAINE HYDROCHLORIDE,EPINEPHRINE BITARTRATE 5 ML: 20; .005 INJECTION, SOLUTION EPIDURAL; INFILTRATION; INTRACAUDAL; PERINEURAL at 03:46

## 2024-05-14 RX ADMIN — Medication 1000 ML: at 03:22

## 2024-05-14 RX ADMIN — MIDAZOLAM HYDROCHLORIDE 0.5 MG: 1 INJECTION, SOLUTION INTRAMUSCULAR; INTRAVENOUS at 02:57

## 2024-05-14 RX ADMIN — ACETAMINOPHEN 1000 MG: 500 TABLET ORAL at 14:25

## 2024-05-14 RX ADMIN — ONDANSETRON 4 MG: 2 INJECTION INTRAMUSCULAR; INTRAVENOUS at 03:44

## 2024-05-14 RX ADMIN — MIDAZOLAM HYDROCHLORIDE 1 MG: 1 INJECTION, SOLUTION INTRAMUSCULAR; INTRAVENOUS at 04:00

## 2024-05-14 RX ADMIN — MIDAZOLAM HYDROCHLORIDE 1.5 MG: 1 INJECTION, SOLUTION INTRAMUSCULAR; INTRAVENOUS at 03:24

## 2024-05-14 RX ADMIN — ACETAMINOPHEN 1000 MG: 500 TABLET ORAL at 02:31

## 2024-05-14 RX ADMIN — OXYCODONE HYDROCHLORIDE 10 MG: 10 TABLET ORAL at 07:12

## 2024-05-14 RX ADMIN — HYDROMORPHONE HYDROCHLORIDE 0.5 MG: 1 INJECTION, SOLUTION INTRAMUSCULAR; INTRAVENOUS; SUBCUTANEOUS at 01:38

## 2024-05-14 RX ADMIN — OXYCODONE HYDROCHLORIDE 10 MG: 10 TABLET ORAL at 21:16

## 2024-05-14 RX ADMIN — MORPHINE SULFATE 0.2 MG: 0.5 INJECTION, SOLUTION EPIDURAL; INTRATHECAL; INTRAVENOUS at 03:04

## 2024-05-14 RX ADMIN — HYDROMORPHONE HYDROCHLORIDE 1 MG: 1 INJECTION, SOLUTION INTRAMUSCULAR; INTRAVENOUS; SUBCUTANEOUS at 00:22

## 2024-05-14 RX ADMIN — SODIUM CHLORIDE, POTASSIUM CHLORIDE, SODIUM LACTATE AND CALCIUM CHLORIDE: 600; 310; 30; 20 INJECTION, SOLUTION INTRAVENOUS at 02:58

## 2024-05-14 RX ADMIN — ACETAMINOPHEN 1000 MG: 500 TABLET ORAL at 20:10

## 2024-05-14 RX ADMIN — FENTANYL CITRATE 20 MCG: 50 INJECTION, SOLUTION INTRAMUSCULAR; INTRAVENOUS at 03:04

## 2024-05-14 RX ADMIN — LIDOCAINE HYDROCHLORIDE,EPINEPHRINE BITARTRATE 2 ML: 20; .005 INJECTION, SOLUTION EPIDURAL; INFILTRATION; INTRACAUDAL; PERINEURAL at 03:14

## 2024-05-14 RX ADMIN — KETOROLAC TROMETHAMINE 15 MG: 15 INJECTION, SOLUTION INTRAMUSCULAR; INTRAVENOUS at 17:10

## 2024-05-14 RX ADMIN — SODIUM CHLORIDE, POTASSIUM CHLORIDE, SODIUM LACTATE AND CALCIUM CHLORIDE 1000 ML: 600; 310; 30; 20 INJECTION, SOLUTION INTRAVENOUS at 09:45

## 2024-05-14 RX ADMIN — HYDROMORPHONE HYDROCHLORIDE 0.5 MG: 1 INJECTION, SOLUTION INTRAMUSCULAR; INTRAVENOUS; SUBCUTANEOUS at 00:48

## 2024-05-14 RX ADMIN — METOCLOPRAMIDE 10 MG: 5 INJECTION, SOLUTION INTRAMUSCULAR; INTRAVENOUS at 02:57

## 2024-05-14 RX ADMIN — HYDROMORPHONE HYDROCHLORIDE 0.5 MG: 1 INJECTION, SOLUTION INTRAMUSCULAR; INTRAVENOUS; SUBCUTANEOUS at 01:16

## 2024-05-14 RX ADMIN — BUPIVACAINE HYDROCHLORIDE 1.8 ML: 7.5 INJECTION, SOLUTION EPIDURAL; RETROBULBAR at 03:04

## 2024-05-14 RX ADMIN — KETOROLAC TROMETHAMINE 30 MG: 30 INJECTION, SOLUTION INTRAMUSCULAR; INTRAVENOUS at 05:35

## 2024-05-14 RX ADMIN — MIDAZOLAM HYDROCHLORIDE 1 MG: 1 INJECTION, SOLUTION INTRAMUSCULAR; INTRAVENOUS at 04:01

## 2024-05-14 RX ADMIN — ACETAMINOPHEN 1000 MG: 500 TABLET ORAL at 08:33

## 2024-05-14 RX ADMIN — FAMOTIDINE 20 MG: 10 INJECTION, SOLUTION INTRAVENOUS at 02:57

## 2024-05-14 RX ADMIN — OXYTOCIN 3 UNITS: 10 INJECTION, SOLUTION INTRAMUSCULAR; INTRAVENOUS at 03:29

## 2024-05-14 RX ADMIN — SODIUM CITRATE AND CITRIC ACID MONOHYDRATE 30 ML: 334; 500 SOLUTION ORAL at 02:32

## 2024-05-14 RX ADMIN — OXYTOCIN 4 UNITS: 10 INJECTION, SOLUTION INTRAMUSCULAR; INTRAVENOUS at 03:35

## 2024-05-14 RX ADMIN — HYDROXYZINE HYDROCHLORIDE 50 MG: 50 INJECTION, SOLUTION INTRAMUSCULAR at 03:43

## 2024-05-14 RX ADMIN — SODIUM CHLORIDE, POTASSIUM CHLORIDE, SODIUM LACTATE AND CALCIUM CHLORIDE: 600; 310; 30; 20 INJECTION, SOLUTION INTRAVENOUS at 03:49

## 2024-05-14 RX ADMIN — KETOROLAC TROMETHAMINE 15 MG: 15 INJECTION, SOLUTION INTRAMUSCULAR; INTRAVENOUS at 22:49

## 2024-05-14 RX ADMIN — KETOROLAC TROMETHAMINE 15 MG: 15 INJECTION, SOLUTION INTRAMUSCULAR; INTRAVENOUS at 12:15

## 2024-05-14 RX ADMIN — Medication 1 APPLICATION: at 08:33

## 2024-05-14 RX ADMIN — OXYCODONE HYDROCHLORIDE 10 MG: 10 TABLET ORAL at 17:18

## 2024-05-14 RX ADMIN — OXYTOCIN 3 UNITS: 10 INJECTION, SOLUTION INTRAMUSCULAR; INTRAVENOUS at 03:22

## 2024-05-14 RX ADMIN — FENTANYL CITRATE 50 MCG: 50 INJECTION, SOLUTION INTRAMUSCULAR; INTRAVENOUS at 02:57

## 2024-05-14 NOTE — ANESTHESIA PROCEDURE NOTES
CSE Block      Patient reassessed immediately prior to procedure    Patient location during procedure: OB  Staffing  Anesthesiologist: Princess Wayne DO  Performed by: Princess Wayne DO  Authorized by: Princess Wayne DO    Preanesthetic Checklist  Completed: patient identified, IV checked, risks and benefits discussed, surgical consent, monitors and equipment checked, pre-op evaluation and timeout performed  CSE  Patient position: sitting  Prep: ChloraPrep  Patient monitoring: blood pressure monitoring and EKG  Procedures: palpation technique  Spinal Needle  Needle type: Juan   Needle gauge: 25 G  Approach: midline  Location: L3-4  Fluid Appearance: clear    Epidural Needle  Injection technique: YOJANA air  Needle type: Tuohy   Needle gauge: 17 G  Location: L3-L4  Level: 5-6  Loss of Resistance: 5cm  Cath Depth at Skin (cm): 12  Aspiration: negative  Test dose: negative      Catheter  Catheter type: side hole  Catheter size: 20 G  Assessment  Dressing:occlusive dressing applied  Pt Tolerance:patient tolerated the procedure well with no apparent complications  Complications:no

## 2024-05-14 NOTE — LACTATION NOTE
24 1115   Maternal Information   Date of Referral 24   Person Making Referral lactation consultant  (newly postpartum)   Maternal Reason for Referral breastfeeding currently  (breastfeed last child for a couple days but had trouble latching so did exclusively pumping for 3 months.)   Infant Reason for Referral  infant   Maternal Infant Feeding   Maternal Emotional State distracted  (Lots of visitors in the room, and older sibling visited while lactation was in the room.)   Latch Assistance verbal guidance offered  (Encouraged football hold and discussed pillow placement and infant placement to assist with the hold due to patient's vertical incision.  Encouraged patient to call for lactation for a latch check.)   Support Person Involvement verbally supports mother   Milk Expression/Equipment   Breast Pump Type double electric, hospital grade;double electric, personal  (Set up a hospital pump at bedside, patient also has a Lansinol pump)   Breast Pump Flange Type hard   Breast Pumping   Breast Pumping Interventions post-feed pumping encouraged  (encouraged to pump after feeds and attempts due to  and appendectomy, and history of low supply.)   Lactation Referrals   Lactation Referrals outpatient lactation program  (discussed an outpatient lactation appointment after discharge if needed.)     Courtesy visit with breastfeeding mother.  Encouraged PRN lactation as needed and discussed the outpatient lactation clinic for any needs after discharge.  Went over basic breastfeeding information and provided written materials with a QR code to  and breastpump QR codes.   Encouraged attempting to breastfeed every 3 hours or more often with feeding cues, using stimulation to encourage high quality milk transfer.  Patient stated that she wants to breast-feed, encouraged patient to call out for a latch check at the next feeding.  All questions and concerns answered at this time.

## 2024-05-14 NOTE — OP NOTE
Operative Report    Patient Name:  Soni Zarate  YOB: 1995  [id number]    2024      PREOPERATIVE DIAGNOSIS: Intrauterine pregnancy, abdominal pain with concern for appendicitis      POSTOPERATIVE DIAGNOSIS: Same        PROCEDURE PERFORMED:     Open Appendectomy        SURGEON: Jose Hill MD      ASSISTANT:    Jason Meredith MD       SPECIMENS: Appendix and contents       ESTIMATED BLOOD LOSS: Minimal for my portion       ANESTHESIA: General.        FINDINGS:  1.  The appendix was mildly thickened, but otherwise without acute pathology or perforation.  Appendectomy was performed to avoid any future diagnostic uncertainty       INDICATIONS:      This patient is a 28 y.o. female with current intrauterine pregnancy who presented with abdominal pain.  A workup by the obstetric service was without obvious cause.  There was some clinical concern for appendicitis.  The decision was made by the obstetric service to proceed to the operating room for  section.  Due to appearance of the appendix intraoperatively I was asked to provide intraoperative consultation.  The patient was encountered already asleep under general anesthesia and informed consent was therefore not able to be obtained due to the urgent nature of the intraoperative consultation.         DESCRIPTION OF PROCEDURE:      The patient was encountered in the operating room already under general anesthesia with a lower midline laparotomy and a wound protector in place in the abdomen.     There was a gravid uterus.  The appendix was identified in the right lower quadrant.  The appendix appeared mildly thickened and hyperemic at the tip, but was mobile and soft.  There is no evidence of perforation.  Due to the clinical concern for appendicitis as well as the fact that the patient was already under general anesthesia, I felt that it was best to proceed forward with appendectomy to prevent any future diagnostic  uncertainty.    Using meticulous blunt dissection the base of the appendix was circumferentially cleared. A window was created in the mesoappendix at the base of the cecum. The appendix was then divided flush with the cecum using a single firing of an Eth[employer] CAMILLE blue load stapler, with care taken not to encroach upon the ileocecal valve.  The staple line was oversewn using 3-0 silk suture in a Lembert fashion.  The appendiceal mesentery was then sequentially divided using clamps and 3-0 silk ties.  The specimen was passed off the field as appendix.  The cut edge of the mesentery and appendiceal staple line were examined and found to be hemostatic.  The terminal ileum was evaluated and was found to be without any obvious pathology and was traced proximally for approximately 100 cm.  The cecum appeared soft and without any obvious pathology.    At this point, the case was turned back over to the obstetrics service for completion of the case and closure of the abdomen. I was personally present and performed all portions of the appendectomy procedure. There were no immediate complications for my portion.        Jose Hill MD  5/14/2024  04:19 EDT

## 2024-05-14 NOTE — OP NOTE
[facility]   Section Operative Note    Pre-Operative Dx:   1.  IUP at 38w6d  weeks     2. Prior   Acute abdomen  Leukocytosis      Postoperative dx:    1.  Same     Procedure: Procedure(s):   SECTION REPEAT, exploratory laparotomy, appendectomy   Surgeon: Emerita Law MD    Assistant: Surgeon(s):  Emerita Law MD O'Broin, Seamus, MD        Anesthesia: Spinal    EBL:   mls.  769  mls.         IV Fluids: 2000 mls.   UOP: 75 mls.       Antibiotics: cefazolin (Ancef)     Infant:            Gender: male  infant    Weight: 3311 g (7 lb 4.8 oz)     Apgars: 8  @ 1 minute /     9  @ 5 minutes    Fetal presentation: cephalic   Amniotic fluid: Clear      Complications:   None      Disposition:   Mother to Mother Baby/Postpartum  in stable condition currently.   Baby to NBN  in stable condition currently.   Indication: Patient presented to labor and delivery and acute abdominal pain.  It started to worsen as the day went on.  She was noted to have a leukocytosis with a white count of 25,000.  She had had draw labs drawn earlier in the day from preadmission testing there were 12,000.  Patient was writhing in pain with positive rebound and guarding.  Decision was made for delivery.  Patient was also consented for an exploratory laparotomy along with possible appendectomy.  Patient understood we would be performing a vertical skin incision to allow for visualization of her abdomen.    Procedure:   The patient was taken to the operating room where epidural anesthesia was placed, and she was placed in supine position with a leftward tilt. Sequential compression devices on bilateral lower extremities and a Alonso catheter placed in her bladder. After being prepped and draped in a normal sterile fashion, a low vertical incision was made with a scalpel and taken down to the level of the fascia using the Bovie. The fascia was incised in the midline and extended superiorly and inferiorly. The  peritoneum was identified, grasped and entered into sharply using Metzenbaum scissors. This incision was extended superiorly and inferiorly with good visualization of the bladder.  An Mahad retractor was used for visualization.  A bladder flap was created. A low transverse uterine incision was made with a scalpel and extended laterally. Amniotomy with clear fluid occurred at the time of hysterotomy. The head was brought to the uterine incision, and using fundal pressure, the head delivered without complication. Nose and mouth were bulb suctioned.  Shoulders and body were to follow.  After 1 minute the cord was clamped x2 and cut. Infant was handed to the awaiting pediatric team.  Cord blood and gases were obtained. The placenta was manually extracted.  The placenta noted to have an appearance of an abruption.  It was sent away to pathology for evaluation.  The uterus was exteriorized and cleared of all clot and debris and the hysterotomy site was closed with a 1-0 chromic in a running locked fashion starting at the left angle and moving towards the right. Copious irrigation behind the uterus ensued. The uterus was returned to the abdominal cavity.  The serosal edge of the placenta noted to have a slight separation that was brought back together with a 4-0 Vicryl repeat in running fashion.  Paracolic gutters were cleared of all clot and debris.  Evaluation of the right upper quadrant and appendix in general, there was noted to be thickening of the appendix at the tip along with the epiploica surrounding it.  Decision made to consult general surgery which was called prior to the procedure for intraoperative evaluation.  Dr. Hill proceeded with appendectomy.  Please see his operative note for more detailed description.  The hysterotomy site was noted to be hemostatic as well as the bladder flap and Interceed was placed over this.  The fascia and peritoneum were reapproximated using a looped 0 PDS in a mass closure  using running fashion starting at the superior edge and moving inferiorly.  The subcutaneous fat layer was hemostatic and closed in an interrupted fashion with 2-0 Plain in 2 layers.  A 2-0 plain was also used to reapproximate the subcuticular in a running fashion.  The skin was reapproximated using staples.. All sponge, lap, and needle counts were correct x2. The patient was taken to the recovery room in stable condition.     Assist:Dr. Jason Bentley was necessary for the success of this case.  He helped with suction work, retraction, suturing, aid in delivery of the fetus.        Emerita Law MD  5/14/2024  04:52 EDT

## 2024-05-14 NOTE — ANESTHESIA PREPROCEDURE EVALUATION
Anesthesia Evaluation     Patient summary reviewed and Nursing notes reviewed   NPO Solid Status: Waived due to emergency  NPO Liquid Status: Waived due to emergency           Airway   Mallampati: II  TM distance: >3 FB  Neck ROM: full  No difficulty expected  Dental - normal exam     Pulmonary - negative pulmonary ROS   Cardiovascular - negative cardio ROS        Neuro/Psych  (+) headaches, psychiatric history Anxiety and Depression  GI/Hepatic/Renal/Endo - negative ROS     Musculoskeletal (-) negative ROS    Abdominal    Substance History - negative use     OB/GYN    (+) Pregnant        Other - negative ROS       ROS/Med Hx Other: Acute abdominal pain              Anesthesia Plan    ASA 2 - emergent     CSE       Anesthetic plan, risks, benefits, and alternatives have been provided, discussed and informed consent has been obtained with: patient.    CODE STATUS:    Level Of Support Discussed With: Patient  Code Status (Patient has no pulse and is not breathing): CPR (Attempt to Resuscitate)  Medical Interventions (Patient has pulse or is breathing): Full Support

## 2024-05-14 NOTE — ANESTHESIA POSTPROCEDURE EVALUATION
Patient: Soni Zarate    Procedure Summary       Date: 24 Room / Location: [facility] LABOR DELIVERY   [facility] LABOR DELIVERY    Anesthesia Start: 257 Anesthesia Stop:     Procedure:  SECTION REPEAT (Abdomen) Diagnosis:     Surgeons: Emerita Law MD Provider: Princess Wayne DO    Anesthesia Type: CSE ASA Status: 2 - Emergent            Anesthesia Type: CSE    Vitals  No vitals data found for the desired time range.  SAT 96%  RR 15  /54    T 97.8        Post Anesthesia Care and Evaluation    Patient location during evaluation: bedside  Patient participation: complete - patient participated  Level of consciousness: awake and awake and alert  Pain score: 0  Pain management: satisfactory to patient    Airway patency: patent  Anesthetic complications: No anesthetic complications  PONV Status: none  Cardiovascular status: acceptable, hemodynamically stable and stable  Respiratory status: acceptable  Hydration status: stable  Post Neuraxial Block status: No signs or symptoms of PDPH

## 2024-05-14 NOTE — PROGRESS NOTES
Patient Name:  Soni Zarate  YOB: 1995  [id number]    Surgery Post - Operative Note    Date of visit: 2024      Subjective: Resting in bed. Abdominal pain much improved        Objective:    /57   Pulse 80   Temp 98.6 °F (37 °C) (Oral)   Resp 16   LMP 2023 (Exact Date)   SpO2 97%   Breastfeeding Yes     CV:  Regular rate and rhythm   L:  Clear to auscultation bilaterally. Not labored on O2 by NC   ABD:  Soft, appropriately tender. Dressings  clean, dry and intact   EXT:  No cyanosis, clubbing or edema        Assessment/ Plan:     Recovering well after appendectomy and  section. I discussed operative findings and recommendations with the patient         Jose Hill MD  2024  14:51 EDT

## 2024-05-14 NOTE — H&P
"[facility]  Obstetric History and Physical    CC:  Severe abdominal pain.     HPI:    Patient is a 28 y.o. female  currently at 38w6d, who presents with a complaint of worsening abdominal pain.  She was here this morning for PAT and was fine.  This afternoon she started noticing that she was having right sided upper abdominal and epigastric pain.  She initially thought it may be baby position or gas, but it  has gotten worse throughout the evening and is now 8-9/10.    Even 2 of Dilaudid only brought it down to a 5/10.   It is described as constant and does not change with moving, breathing , or even after she had eaten several hours ago.    She has had 2 normal bowel movements today.   She denies feeling contractions, vaginal bleeding or leaking.    +FM.   She denies fever, but has had nausea and vomiting from the pain.          The following portions of the patients history were reviewed and updated as appropriate: current medications, allergies, past medical history, past surgical history, past family history, past social history and problem list .       Prenatal Information:   Maternal Prenatal Labs  Blood Type ABO Type   Date Value Ref Range Status   2024 O  Final      Rh Status RH type   Date Value Ref Range Status   2024 Positive  Final      Antibody Screen Antibody Screen   Date Value Ref Range Status   2024 Negative  Final      Gonnorhea No results found for: \"GCCX\"   Chlamydia No results found for: \"CLAMYDCU\"   RPR No results found for: \"RPR\"   Syphilis Antibody No results found for: \"SYPHILIS\"   Rubella No results found for: \"RUBELLAIGGIN\"   Hepatitis B Surface Antigen No results found for: \"HEPBSAG\"   HIV-1 Antibody No results found for: \"LABHIV1\"   Hepatitis C Antibody No results found for: \"HEPCAB\"   Rapid Urin Drug Screen No results found for: \"AMPMETHU\", \"BARBITSCNUR\", \"LABBENZSCN\", \"LABMETHSCN\", \"LABOPIASCN\", \"THCURSCR\", \"COCAINEUR\", \"AMPHETSCREEN\", \"PROPOXSCN\", " "\"BUPRENORSCNU\", \"METAMPSCNUR\", \"OXYCODONESCN\", \"TRICYCLICSCN\"   Group B Strep Culture No results found for: \"GBSANTIGEN\"           External Prenatal Results       Pregnancy Outside Results - Transcribed From Office Records - See Scanned Records For Details       Test Value Date Time    ABO  O  05/13/24 0945    Rh  Positive  05/13/24 0945    Antibody Screen  Negative  05/13/24 0945       Negative  03/04/24 1348       Negative  10/06/23 1008    Varicella IgG       Rubella  3.89 index 10/06/23 1008    Hgb  11.5 g/dL 05/14/24 0017       11.0 g/dL 05/13/24 0947       9.9 g/dL 03/04/24 1348       11.3 g/dL 10/06/23 1008    Hct  35.7 % 05/14/24 0017       35.1 % 05/13/24 0947       31.7 % 03/04/24 1348       36.6 % 10/06/23 1008    Glucose Fasting GTT       Glucose Tolerance Test 1 hour ^ 135  09/14/18     Glucose Tolerance Test 3 hour       Gonorrhea (discrete) ^ Negative  11/14/18     Chlamydia (discrete) ^ Negative  11/14/18     RPR  Non Reactive  03/04/24 1348       Non Reactive  10/06/23 1008    VDRL       Syphilis Antibody       HBsAg  Negative  10/06/23 1008    Herpes Simplex Virus PCR       Herpes Simplex VIrus Culture       HIV  Non Reactive  10/06/23 1008    Hep C RNA Quant PCR       Hep C Antibody  Non Reactive  10/06/23 1008    AFP       Group B Strep  No Group B Streptococcus isolated  04/30/24 1127    GBS Susceptibility to Clindamycin       GBS Susceptibility to Erythromycin       Fetal Fibronectin       Genetic Testing, Maternal Blood                 Drug Screening       Test Value Date Time    Urine Drug Screen       Amphetamine Screen  Negative ng/mL 10/06/23 1008    Barbiturate Screen  Negative ng/mL 10/06/23 1008    Benzodiazepine Screen  Negative ng/mL 10/06/23 1008    Methadone Screen  Negative ng/mL 10/06/23 1008    Phencyclidine Screen  Negative ng/mL 10/06/23 1008    Opiates Screen  Negative  12/01/18 2311    THC Screen  Negative  12/01/18 2311    Cocaine Screen       Propoxyphene Screen  Negative " ng/mL 10/06/23 1008    Buprenorphine Screen  Negative  181    Methamphetamine Screen       Oxycodone Screen  Negative  181    Tricyclic Antidepressants Screen  Negative  18              Legend    ^: Historical                              Past OB History:     OB History    Para Term  AB Living   3 1 1 0 1 1   SAB IAB Ectopic Molar Multiple Live Births   1 0 0 0 0 1      # Outcome Date GA Lbr Sesar/2nd Weight Sex Type Anes PTL Lv   3 Current            2 Term 18 39w1d  3735 g (8 lb 3.8 oz) F CS-LTranv EPI N KATHERINE      Complications: Failure to Progress in Second Stage      Name: ALFREDO GARCIA      Apgar1: 8  Apgar5: 9   1 SAB                Past Medical History: Past Medical History:   Diagnosis Date    Abnormal Pap smear of cervix     HPV    Anemia     Anxiety     Depression     Hyperemesis gravidarum     Kidney stones     last one     Migraines     Miscarriage 2023    Post partum depression     2018    SVT (supraventricular tachycardia) 2008    ablasion at age 13    Wears glasses       Past Surgical History Past Surgical History:   Procedure Laterality Date    CARDIAC ABLATION      Hx SVT     SECTION N/A 2018    Procedure:  SECTION PRIMARY;  Surgeon: Caitlin Martinez MD;  Location: [facility] LABOR DELIVERY;  Service: Obstetrics    KIDNEY STONE SURGERY        Family History: Family History   Problem Relation Age of Onset    Diabetes Mother     Hypertension Mother     Mental illness Mother     Migraines Mother     Thyroid disease Mother     Depression Mother     Anxiety disorder Mother     Diabetes Father     Hyperlipidemia Father     Mental illness Father     Asthma Brother     Heart attack Maternal Grandfather     Breast cancer Neg Hx     Ovarian cancer Neg Hx     Uterine cancer Neg Hx     Colon cancer Neg Hx       Social History:  reports that she has never smoked. She has never used smokeless tobacco.   reports no history of  alcohol use.   reports no history of drug use.        Review of Systems  Denies HA,Shortness of air, muscle weakness,                                             and rashes      Objective     Vital Signs Range for the last 24 hours  Temperature: Temp:  [98.3 °F (36.8 °C)-98.6 °F (37 °C)] 98.3 °F (36.8 °C)   Temp Source: Temp src: Axillary   BP: BP: (130-136)/(76-99) 136/76   Pulse:     Respirations: Resp:  [18] 18   SPO2:     O2 Amount (l/min):     O2 Devices     Weight: Weight:  [107 kg (235 lb 14.3 oz)] 107 kg (235 lb 14.3 oz)     Physical Examination: General appearance - alert,  Appears in pain with tears in eyes.   Chest   CTAB  Heart - Regular rate  Abdomen - Significant tenderness in her RUQ are and epigastric.    No rebound tenderness.    Extremities - pedal edema +1      Fetal Heart Rate Assessment   Method: Fetal HR Assessment Method: external   Beats/min: Fetal HR (beats/min): 150   Baseline: Fetal HR Baseline: normal range   Varibility: Fetal HR Variability: moderate (amplitude range 6 to 25 bpm)   Accels: Fetal HR Accelerations: greater than/equal to 15 bpm, lasting at least 15 seconds   Decels: Fetal HR Decelerations: absent   Tracing Category:       Uterine Assessment   Method: Method: external tocotransducer   Frequency (min):     Ctx Count in 10 min:     Duration:     Intensity: Contraction Intensity: no contractions   Intensity by IUPC:     Resting Tone: Uterine Resting Tone: soft by palpation   Resting Tone by IUPC:       Labs:    Lab Results   Component Value Date     05/14/2024    HGB 11.5 (L) 05/14/2024    HCT 35.7 05/14/2024    WBC 25.81 (H) 05/14/2024      Lab Results   Component Value Date     05/14/2024    K 4.3 05/14/2024     05/14/2024    CO2 23.0 05/14/2024    BUN 7 05/14/2024    CREATININE 0.74 05/14/2024    CREATININE 0.74 05/14/2024    GLUCOSE 102 (H) 05/14/2024    ALBUMIN 3.5 05/14/2024    CALCIUM 9.9 05/14/2024    AST 19 05/14/2024    AST 18 05/14/2024    ALT 13  "05/14/2024    ALT 13 05/14/2024    BILITOT 0.2 05/14/2024    BILITOT 0.2 05/14/2024      Lab Results   Component Value Date    AMYLASE 76 05/14/2024    LIPASE 25 05/14/2024    No results found for: \"PROTIME\", \"INR\", \"PTT\", \"FIBRINOGEN\"       Radiology:    Study Result    CT ABDOMEN PELVIS WO CONTRAST     Date of Exam: 5/14/2024 1:22 AM EDT     Indication: severe abdominal pain. 38 weeks pregnant     Comparison: None available.     Technique: Axial CT images were obtained of the abdomen and pelvis without the administration of contrast. Reconstructed coronal and sagittal images were also obtained. Automated exposure control and iterative construction methods were used.        Findings:  Lung Bases:     The visualized lung bases and lower mediastinal structures are unremarkable.     Limited evaluation of the solid organs due to lack of intravenous contrast.  Liver:  Liver is normal in size and CT density. No focal lesions.     Biliary/Gallbladder:    The gallbladder is normal without evidence of radiopaque stones. The biliary tree is nondilated.     Spleen:  Spleen is normal in size and CT density.     Pancreas:    Pancreas is normal. There is no evidence of pancreatic mass or peripancreatic fluid.     Kidneys:    Kidneys are normal in size. There is mild right-sided hydronephrosis. The ureter is difficult to trace due to gravid uterus no definite obstructing calculus identified.. Punctate nonobstructing calculus seen within the superior pole of the left kidney.   No left-sided hydronephrosis.     Adrenals:    Adrenal glands are unremarkable.     Retroperitoneal/Lymph Nodes/Vasculature:    No retroperitoneal adenopathy is identified.     Gastrointestinal/Mesentery:    The bowel loops are non-dilated without wall thickening or mass. The appendix is not well visualized. There is a curvilinear structure seen within the right hemiabdomen (series 2 image 81) which may represent the appendix though this may represent a "   vessel. Evaluation is limited due to lack of intravenous contrast and gravid uterus. This is high within the right hemiabdomen and is not within the right lower quadrant.. No evidence of obstruction. No free air. No mesenteric fluid collections   identified.     Bladder:    The bladder is normal.     Genital:     Gravid uterus. Intrauterine contents not evaluated. No adnexal masses identified.          Bony Structures:     Visualized bony structures are consistent with the patient's age.        IMPRESSION:  Impression:  Mild right-sided hydronephrosis which may be related to pregnancy. No obstructing calculus identified. Recently passed stone is in the differential. Difficult to trace the ureters due to gravid uterus. Punctate nonobstructing calculus seen within the   superior pole of the left kidney., No acute process identified.                 Electronically Signed: Gisel Francisco MD    5/14/2024 1:49 AM EDT    Workstation ID: [id number]      Assessment:  1. Intrauterine pregnancy at 38w6d weeks gestation with reassuring fetal status  2.  Acute abdominal pain  3.  Previous C/S Desire repeat  4.  Possible appy    Plan:  1. To OR for RLTCS and possible ex-lap          Jason Meredith MD  5/14/2024  02:25 EDT

## 2024-05-15 LAB
BASOPHILS # BLD AUTO: 0.02 10*3/MM3 (ref 0–0.2)
BASOPHILS NFR BLD AUTO: 0.1 % (ref 0–1.5)
CYTO UR: NORMAL
CYTO UR: NORMAL
DEPRECATED RDW RBC AUTO: 53.9 FL (ref 37–54)
EOSINOPHIL # BLD AUTO: 0.08 10*3/MM3 (ref 0–0.4)
EOSINOPHIL NFR BLD AUTO: 0.5 % (ref 0.3–6.2)
ERYTHROCYTE [DISTWIDTH] IN BLOOD BY AUTOMATED COUNT: 17.3 % (ref 12.3–15.4)
HCT VFR BLD AUTO: 27.3 % (ref 34–46.6)
HGB BLD-MCNC: 8.3 G/DL (ref 12–15.9)
IMM GRANULOCYTES # BLD AUTO: 0.08 10*3/MM3 (ref 0–0.05)
IMM GRANULOCYTES NFR BLD AUTO: 0.5 % (ref 0–0.5)
LAB AP CASE REPORT: NORMAL
LAB AP CASE REPORT: NORMAL
LAB AP CLINICAL INFORMATION: NORMAL
LAB AP CLINICAL INFORMATION: NORMAL
LYMPHOCYTES # BLD AUTO: 1.89 10*3/MM3 (ref 0.7–3.1)
LYMPHOCYTES NFR BLD AUTO: 12.8 % (ref 19.6–45.3)
MCH RBC QN AUTO: 25.8 PG (ref 26.6–33)
MCHC RBC AUTO-ENTMCNC: 30.4 G/DL (ref 31.5–35.7)
MCV RBC AUTO: 84.8 FL (ref 79–97)
MONOCYTES # BLD AUTO: 0.95 10*3/MM3 (ref 0.1–0.9)
MONOCYTES NFR BLD AUTO: 6.4 % (ref 5–12)
NEUTROPHILS NFR BLD AUTO: 11.74 10*3/MM3 (ref 1.7–7)
NEUTROPHILS NFR BLD AUTO: 79.7 % (ref 42.7–76)
NRBC BLD AUTO-RTO: 0 /100 WBC (ref 0–0.2)
PATH REPORT.FINAL DX SPEC: NORMAL
PATH REPORT.FINAL DX SPEC: NORMAL
PATH REPORT.GROSS SPEC: NORMAL
PATH REPORT.GROSS SPEC: NORMAL
PLATELET # BLD AUTO: 171 10*3/MM3 (ref 140–450)
PMV BLD AUTO: 11.2 FL (ref 6–12)
RBC # BLD AUTO: 3.22 10*6/MM3 (ref 3.77–5.28)
WBC NRBC COR # BLD AUTO: 14.76 10*3/MM3 (ref 3.4–10.8)

## 2024-05-15 PROCEDURE — 85025 COMPLETE CBC W/AUTO DIFF WBC: CPT | Performed by: OBSTETRICS & GYNECOLOGY

## 2024-05-15 PROCEDURE — 25010000002 KETOROLAC TROMETHAMINE PER 15 MG: Performed by: OBSTETRICS & GYNECOLOGY

## 2024-05-15 PROCEDURE — 0503F POSTPARTUM CARE VISIT: CPT | Performed by: NURSE PRACTITIONER

## 2024-05-15 PROCEDURE — 25010000002 ENOXAPARIN PER 10 MG: Performed by: OBSTETRICS & GYNECOLOGY

## 2024-05-15 RX ORDER — DIPHENHYDRAMINE HCL 25 MG
25 CAPSULE ORAL EVERY 4 HOURS PRN
Status: DISCONTINUED | OUTPATIENT
Start: 2024-05-15 | End: 2024-05-17 | Stop reason: HOSPADM

## 2024-05-15 RX ORDER — NALOXONE HCL 0.4 MG/ML
0.4 VIAL (ML) INJECTION
Status: ACTIVE | OUTPATIENT
Start: 2024-05-15 | End: 2024-05-16

## 2024-05-15 RX ORDER — ONDANSETRON 2 MG/ML
4 INJECTION INTRAMUSCULAR; INTRAVENOUS ONCE AS NEEDED
Status: ACTIVE | OUTPATIENT
Start: 2024-05-15 | End: 2024-05-16

## 2024-05-15 RX ORDER — DIPHENHYDRAMINE HYDROCHLORIDE 50 MG/ML
25 INJECTION INTRAMUSCULAR; INTRAVENOUS ONCE AS NEEDED
Status: DISCONTINUED | OUTPATIENT
Start: 2024-05-15 | End: 2024-05-17 | Stop reason: HOSPADM

## 2024-05-15 RX ORDER — DIPHENHYDRAMINE HYDROCHLORIDE 50 MG/ML
25 INJECTION INTRAMUSCULAR; INTRAVENOUS EVERY 4 HOURS PRN
Status: DISCONTINUED | OUTPATIENT
Start: 2024-05-15 | End: 2024-05-17 | Stop reason: HOSPADM

## 2024-05-15 RX ADMIN — KETOROLAC TROMETHAMINE 15 MG: 15 INJECTION, SOLUTION INTRAMUSCULAR; INTRAVENOUS at 05:02

## 2024-05-15 RX ADMIN — ACETAMINOPHEN 650 MG: 325 TABLET ORAL at 14:03

## 2024-05-15 RX ADMIN — PRENATAL VITAMINS-IRON FUMARATE 27 MG IRON-FOLIC ACID 0.8 MG TABLET 1 TABLET: at 08:25

## 2024-05-15 RX ADMIN — OXYCODONE HYDROCHLORIDE 10 MG: 10 TABLET ORAL at 18:12

## 2024-05-15 RX ADMIN — DOCUSATE SODIUM 100 MG: 100 CAPSULE, LIQUID FILLED ORAL at 08:26

## 2024-05-15 RX ADMIN — SIMETHICONE 80 MG: 80 TABLET, CHEWABLE ORAL at 20:47

## 2024-05-15 RX ADMIN — IBUPROFEN 600 MG: 600 TABLET, FILM COATED ORAL at 10:38

## 2024-05-15 RX ADMIN — OXYCODONE HYDROCHLORIDE 10 MG: 10 TABLET ORAL at 02:59

## 2024-05-15 RX ADMIN — ENOXAPARIN SODIUM 40 MG: 100 INJECTION SUBCUTANEOUS at 08:26

## 2024-05-15 RX ADMIN — OXYCODONE HYDROCHLORIDE 10 MG: 10 TABLET ORAL at 23:52

## 2024-05-15 RX ADMIN — ACETAMINOPHEN 650 MG: 325 TABLET ORAL at 08:25

## 2024-05-15 RX ADMIN — IBUPROFEN 600 MG: 600 TABLET, FILM COATED ORAL at 17:09

## 2024-05-15 RX ADMIN — SIMETHICONE 80 MG: 80 TABLET, CHEWABLE ORAL at 08:25

## 2024-05-15 RX ADMIN — IBUPROFEN 600 MG: 600 TABLET, FILM COATED ORAL at 23:52

## 2024-05-15 RX ADMIN — ACETAMINOPHEN 1000 MG: 500 TABLET ORAL at 01:54

## 2024-05-15 RX ADMIN — OXYCODONE HYDROCHLORIDE 10 MG: 10 TABLET ORAL at 14:03

## 2024-05-15 RX ADMIN — ACETAMINOPHEN 650 MG: 325 TABLET ORAL at 20:47

## 2024-05-15 NOTE — PROGRESS NOTES
Postpartum Progress Note    Patient name: Soin Zarate  YOB: 1995   MRN: [id number]  Referring Provider: No Known Provider  Admission Date: 2024  Date of Service: 5/15/2024    ID: 28 y.o.     Diagnosis:   S/p  delivery 1 Day Post-Op     Acute abdominal pain    Previous  section       Subjective:      No complaints.  Moderate lochia.  Ambulating, voiding, tolerating diet.  Pain well controlled.  The patient is currently breastfeeding.   This baby is a male    Objective:      Vital signs:  Vital Signs Range for the last 24 hours  Temperature: Temp:  [98.3 °F (36.8 °C)-98.6 °F (37 °C)] 98.3 °F (36.8 °C)   Temp Source: Temp src: Oral   BP: BP: (101-116)/(50-57) 107/56   Pulse: Heart Rate:  [] 100   Respirations: Resp:  [16-18] 18   Weight:       General: Alert & oriented x4, in no apparent distress  Abdomen: soft, nontender  Uterus: firm, nontender  Incision: clean, dry, intact, dressing clean, vertical skin incision with staples  Extremities: nontender; no edema      Labs:  Lab Results   Component Value Date    WBC 14.76 (H) 05/15/2024    HGB 8.3 (L) 05/15/2024    HCT 27.3 (L) 05/15/2024    MCV 84.8 05/15/2024     05/15/2024     Results from last 7 days   Lab Units 24  0945   ABO TYPING  O   RH TYPING  Positive     External Prenatal Results       Pregnancy Outside Results - Transcribed From Office Records - See Scanned Records For Details       Test Value Date Time    ABO  O  24 0945    Rh  Positive  24 0945    Antibody Screen  Negative  24 0945       Negative  24 1348       Negative  10/06/23 1008    Varicella IgG       Rubella  3.89 index 10/06/23 1008    Hgb  8.3 g/dL 05/15/24 0418       9.0 g/dL 24 0828       11.5 g/dL 24 0017       11.0 g/dL 24 0947       9.9 g/dL 24 1348       11.3 g/dL 10/06/23 1008    Hct  27.3 % 05/15/24 0418       28.7 % 24 0828       35.7 % 24 0017       35.1  % 24 0947       31.7 % 24 1348       36.6 % 10/06/23 1008    Glucose Fasting GTT       Glucose Tolerance Test 1 hour       Glucose Tolerance Test 3 hour       Gonorrhea (discrete)       Chlamydia (discrete)       RPR  Non Reactive  24 1348       Non Reactive  10/06/23 1008    VDRL       Syphilis Antibody       HBsAg  Negative  10/06/23 1008    Herpes Simplex Virus PCR       Herpes Simplex VIrus Culture       HIV  Non Reactive  10/06/23 1008    Hep C RNA Quant PCR       Hep C Antibody  Non Reactive  10/06/23 1008    AFP       Group B Strep  No Group B Streptococcus isolated  24 1127    GBS Susceptibility to Clindamycin       GBS Susceptibility to Erythromycin       Fetal Fibronectin       Genetic Testing, Maternal Blood                 Drug Screening       Test Value Date Time    NIPT        Urine Drug Screen       Amphetamine Screen  Negative ng/mL 10/06/23 1008    Barbiturate Screen  Negative ng/mL 10/06/23 1008    Benzodiazepine Screen  Negative ng/mL 10/06/23 1008    Methadone Screen  Negative ng/mL 10/06/23 1008    Phencyclidine Screen  Negative ng/mL 10/06/23 1008    Opiates Screen       THC Screen       Cocaine Screen       Propoxyphene Screen  Negative ng/mL 10/06/23 1008    Buprenorphine Screen       Methamphetamine Screen       Oxycodone Screen                 Legend    ^: Historical                            Assessment/Plan:      1 Day Post-Op s/p Procedure(s):   SECTION REPEAT  1. S/p  delivery with appendectomy: Continue postoperative care.  Doing well.  2. Infant feeding: Supportive care.  The patient is currently breastfeeding. Male infant. Parents are unsure of circumcision.  3.  Pain well controlled. Pt ambulating. May shower

## 2024-05-15 NOTE — PROGRESS NOTES
Patient Name:  Soni Zarate  YOB: 1995  [id number]    Surgery Progress Note    Date of visit: 5/15/2024      Subjective: No acute events.  Abdominal pain that she presented with is resolved.  Has some incisional pain at this point.  Tolerating a diet without difficulty.  No flatus or bowel movement          Objective:     /72 (BP Location: Right arm)   Pulse 92   Temp 98 °F (36.7 °C) (Oral)   Resp 18   LMP 2023 (Exact Date)   SpO2 97%   Breastfeeding Yes     Intake/Output Summary (Last 24 hours) at 5/15/2024 181  Last data filed at 5/15/2024 1400  Gross per 24 hour   Intake --   Output 1250 ml   Net -1250 ml       GEN:   Awake, alert, in no acute distress, resting comfortably in bed   CV:   Regular rate and rhythm  L:  Symmetric expansion, not labored on room air  Abd:  Soft, appropriately tender to palpation along midline incision  Ext:  No cyanosis, clubbing, or edema    Recent labs that are back at this time have been reviewed.           Assessment/ Plan:    Mrs. Cruz is a 28-year-old lady who is admitted after presenting with abdominal pain at 38 weeks pregnancy who underwent  section and open appendectomy on May 14, 2024    -Postop day 1  -Tolerating a diet.  Recovering as expected.  Vitals are stable  -No changes from my standpoint.    -Pathology was benign and showed acute appendicitis.       Jose Hill MD  5/15/2024  18:16 EDT

## 2024-05-16 PROBLEM — Z34.93 PRENATAL CARE IN THIRD TRIMESTER: Status: ACTIVE | Noted: 2024-05-16

## 2024-05-16 LAB
BASOPHILS # BLD AUTO: 0.03 10*3/MM3 (ref 0–0.2)
BASOPHILS NFR BLD AUTO: 0.2 % (ref 0–1.5)
DEPRECATED RDW RBC AUTO: 53.9 FL (ref 37–54)
EOSINOPHIL # BLD AUTO: 0.29 10*3/MM3 (ref 0–0.4)
EOSINOPHIL NFR BLD AUTO: 2.3 % (ref 0.3–6.2)
ERYTHROCYTE [DISTWIDTH] IN BLOOD BY AUTOMATED COUNT: 17.5 % (ref 12.3–15.4)
HCT VFR BLD AUTO: 29.2 % (ref 34–46.6)
HGB BLD-MCNC: 8.9 G/DL (ref 12–15.9)
IMM GRANULOCYTES # BLD AUTO: 0.07 10*3/MM3 (ref 0–0.05)
IMM GRANULOCYTES NFR BLD AUTO: 0.6 % (ref 0–0.5)
LYMPHOCYTES # BLD AUTO: 2.03 10*3/MM3 (ref 0.7–3.1)
LYMPHOCYTES NFR BLD AUTO: 16.2 % (ref 19.6–45.3)
MCH RBC QN AUTO: 25.6 PG (ref 26.6–33)
MCHC RBC AUTO-ENTMCNC: 30.5 G/DL (ref 31.5–35.7)
MCV RBC AUTO: 84.1 FL (ref 79–97)
MONOCYTES # BLD AUTO: 0.57 10*3/MM3 (ref 0.1–0.9)
MONOCYTES NFR BLD AUTO: 4.6 % (ref 5–12)
NEUTROPHILS NFR BLD AUTO: 76.1 % (ref 42.7–76)
NEUTROPHILS NFR BLD AUTO: 9.51 10*3/MM3 (ref 1.7–7)
NRBC BLD AUTO-RTO: 0 /100 WBC (ref 0–0.2)
PLATELET # BLD AUTO: 197 10*3/MM3 (ref 140–450)
PMV BLD AUTO: 10.4 FL (ref 6–12)
RBC # BLD AUTO: 3.47 10*6/MM3 (ref 3.77–5.28)
WBC NRBC COR # BLD AUTO: 12.5 10*3/MM3 (ref 3.4–10.8)

## 2024-05-16 PROCEDURE — 85025 COMPLETE CBC W/AUTO DIFF WBC: CPT | Performed by: OBSTETRICS & GYNECOLOGY

## 2024-05-16 PROCEDURE — 25010000002 ENOXAPARIN PER 10 MG: Performed by: OBSTETRICS & GYNECOLOGY

## 2024-05-16 PROCEDURE — 0503F POSTPARTUM CARE VISIT: CPT | Performed by: OBSTETRICS & GYNECOLOGY

## 2024-05-16 RX ORDER — SUMATRIPTAN 50 MG/1
50 TABLET, FILM COATED ORAL
Status: DISCONTINUED | OUTPATIENT
Start: 2024-05-16 | End: 2024-05-16

## 2024-05-16 RX ORDER — SUMATRIPTAN 50 MG/1
50 TABLET, FILM COATED ORAL DAILY PRN
Status: DISCONTINUED | OUTPATIENT
Start: 2024-05-16 | End: 2024-05-17 | Stop reason: HOSPADM

## 2024-05-16 RX ADMIN — IBUPROFEN 600 MG: 600 TABLET, FILM COATED ORAL at 23:04

## 2024-05-16 RX ADMIN — SIMETHICONE 80 MG: 80 TABLET, CHEWABLE ORAL at 08:08

## 2024-05-16 RX ADMIN — SIMETHICONE 80 MG: 80 TABLET, CHEWABLE ORAL at 02:37

## 2024-05-16 RX ADMIN — OXYCODONE HYDROCHLORIDE 10 MG: 10 TABLET ORAL at 23:04

## 2024-05-16 RX ADMIN — OXYCODONE HYDROCHLORIDE 10 MG: 10 TABLET ORAL at 17:15

## 2024-05-16 RX ADMIN — PRENATAL VITAMINS-IRON FUMARATE 27 MG IRON-FOLIC ACID 0.8 MG TABLET 1 TABLET: at 08:08

## 2024-05-16 RX ADMIN — IBUPROFEN 600 MG: 600 TABLET, FILM COATED ORAL at 10:54

## 2024-05-16 RX ADMIN — ACETAMINOPHEN 650 MG: 325 TABLET ORAL at 08:08

## 2024-05-16 RX ADMIN — ACETAMINOPHEN 650 MG: 325 TABLET ORAL at 13:32

## 2024-05-16 RX ADMIN — DOCUSATE SODIUM 100 MG: 100 CAPSULE, LIQUID FILLED ORAL at 08:08

## 2024-05-16 RX ADMIN — OXYCODONE HYDROCHLORIDE 10 MG: 10 TABLET ORAL at 13:32

## 2024-05-16 RX ADMIN — IBUPROFEN 600 MG: 600 TABLET, FILM COATED ORAL at 05:55

## 2024-05-16 RX ADMIN — ACETAMINOPHEN 650 MG: 325 TABLET ORAL at 20:25

## 2024-05-16 RX ADMIN — OXYCODONE HYDROCHLORIDE 10 MG: 10 TABLET ORAL at 08:11

## 2024-05-16 RX ADMIN — IBUPROFEN 600 MG: 600 TABLET, FILM COATED ORAL at 17:15

## 2024-05-16 RX ADMIN — ACETAMINOPHEN 650 MG: 325 TABLET ORAL at 02:37

## 2024-05-16 RX ADMIN — ENOXAPARIN SODIUM 40 MG: 100 INJECTION SUBCUTANEOUS at 08:08

## 2024-05-16 NOTE — PROGRESS NOTES
Patient Name:  Soni Zarate  YOB: 1995  [id number]    Surgery Progress Note    Date of visit: 2024      Subjective: No acute events.  Only some incisional abdominal pain.  No nausea or vomiting.  Passing flatus.  No fevers or chills.          Objective:     /70 (BP Location: Right arm)   Pulse 94   Temp 97.9 °F (36.6 °C) (Oral)   Resp 16   LMP 2023 (Exact Date)   SpO2 97%   Breastfeeding Yes   No intake or output data in the 24 hours ending 24 1420    GEN:   Awake, alert, in no acute distress, resting comfortably in bed   CV:   Regular rate and rhythm  L:  Symmetric expansion, not labored on room air  Abd:  Soft, not distended, appropriately tender palpation on midline incision, the incision is clean dry intact  Ext:  No cyanosis, clubbing, or edema    Recent labs that are back at this time have been reviewed.           Assessment/ Plan:    Mrs. Cruz is a 28-year-old lady who is admitted after presenting with abdominal pain at 38 weeks pregnancy who underwent  section and open appendectomy on May 14, 2024     -Postop day 2  -Labs and vitals appropriate  -Tolerating diet.  Passing flatus.  Only some expected incisional postoperative abdominal pain  -Pathology showed acute appendicitis.  I have discussed this with her.  -No need for antibiotics or further treatment.  -Okay for discharge from my standpoint whenever cleared from an obstetric standpoint.  No general surgery follow-up needed.  I will sign off at this point      Jose Hill MD  2024  14:20 EDT

## 2024-05-16 NOTE — PROGRESS NOTES
2024    Name:Soni Zarate    MR#:[id number]     PROGRESS NOTE:  Post-Op Day 2 S/P    HD:2    Subjective   28 y.o. yo Female  s/p CS at 38w6d and appendectomy for acute abdomen on presentation, doing pretty well. Pain she presented with is gone but now postoperative pain pretty-well controlled. Tolerating regular diet and having flatus. Lochia normal.       Acute abdominal pain    Previous  section        Objective    Vitals  Temp:  Temp:  [97.9 °F (36.6 °C)-98.6 °F (37 °C)] 97.9 °F (36.6 °C)  Temp src: Oral  BP:  BP: (119-134)/(64-72) 121/70  Pulse:  Heart Rate:  [] 94  RR:   Resp:  [16-18] 16    General Awake, alert, no distress  Abdomen Soft, moderately distended, fundus firm, below umbilicus, appropriately tender  Incision  Intact, no erythema or exudate  Extremities Calves NT bilaterally     I/O last 3 completed shifts:  In: -   Out: 1250 [Urine:1250]    LABS:   Lab Results   Component Value Date    WBC 12.50 (H) 2024    HGB 8.9 (L) 2024    HCT 29.2 (L) 2024    MCV 84.1 2024     2024       Infant: male       Assessment   1.  POD 2    Plan: Doing well.    H/H stable  Encouraged binder  Appreciate Dr. Hill's involvement  Encouraged breastfeeding  Will want circ for baby    Chelle Rosenthal MD  2024 09:34 EDT

## 2024-05-16 NOTE — LACTATION NOTE
24 1000   Maternal Information   Date of Referral 24   Person Making Referral lactation consultant   Maternal Reason for Referral breastfeeding currently;breastfeeding unsuccessful in past   Infant Reason for Referral  infant   Maternal Assessment   Breast Shape Bilateral:;round   Breast Density Bilateral:;soft;filling   Nipples Bilateral:;graspable   Left Nipple Symptoms intact;nontender   Right Nipple Symptoms intact;nontender   Maternal Infant Feeding   Maternal Emotional State receptive   Infant Positioning clutch/football  (right)   Signs of Milk Transfer audible swallow;breasts soften with feeding;deep jaw excursions noted;suck/swallow ratio;transfer present   Pain with Feeding no   Comfort Measures Before/During Feeding infant position adjusted;other (see comments)  (using small nipple shield)   Latch Assistance minimal assistance;verbal guidance offered   Support Person Involvement verbally supports mother  (patient's mother at bedside)   Milk Expression/Equipment   Breast Pump Type double electric, hospital grade;double electric, personal   Breast Pumping   Breast Pumping Interventions other (see comments)  (encouraged to pump for comfort, not until empty, if feeling that she is engorged.)   Lactation Referrals   Lactation Referrals outpatient lactation program   Outpatient Lactation Program Lactation Follow-up Date/Time encouraged to follow up in 1-2 weeks     Courtesy follow up visit. RN reports patient is uncomfortably engorged and pumped 80ml earlier. Patient with infant in football at right breast when LC entered. Using small nipple shield. Baby not maintaining latch due to poor positioning, but milk is noted in shield. Assisted MOB to bring baby further back under her arm and stressed the importance of belly to belly, nipple to nose to achieve deeper latch. Infant then latched with shield and nursed without difficulty for 20 min. MOB reports comfort and denies further needs. Due  to shield use, history of unsuccessful breastfeeding, and traumatic csection with appendectomy, encouraged patient to follow up with outpatient lactation in 1-2 weeks or sooner if needed. Patient reports she has been feeling painfully engorged and pumping until empty after feedings. Encouraged to pump for comfort after feedings instead.

## 2024-05-17 VITALS
DIASTOLIC BLOOD PRESSURE: 67 MMHG | HEART RATE: 81 BPM | RESPIRATION RATE: 18 BRPM | TEMPERATURE: 98.2 F | SYSTOLIC BLOOD PRESSURE: 125 MMHG | OXYGEN SATURATION: 97 %

## 2024-05-17 LAB
BASOPHILS # BLD AUTO: 0.03 10*3/MM3 (ref 0–0.2)
BASOPHILS NFR BLD AUTO: 0.3 % (ref 0–1.5)
DEPRECATED RDW RBC AUTO: 52.7 FL (ref 37–54)
EOSINOPHIL # BLD AUTO: 0.4 10*3/MM3 (ref 0–0.4)
EOSINOPHIL NFR BLD AUTO: 4.3 % (ref 0.3–6.2)
ERYTHROCYTE [DISTWIDTH] IN BLOOD BY AUTOMATED COUNT: 17.2 % (ref 12.3–15.4)
HCT VFR BLD AUTO: 29.8 % (ref 34–46.6)
HGB BLD-MCNC: 9.2 G/DL (ref 12–15.9)
IMM GRANULOCYTES # BLD AUTO: 0.05 10*3/MM3 (ref 0–0.05)
IMM GRANULOCYTES NFR BLD AUTO: 0.5 % (ref 0–0.5)
LYMPHOCYTES # BLD AUTO: 1.94 10*3/MM3 (ref 0.7–3.1)
LYMPHOCYTES NFR BLD AUTO: 20.6 % (ref 19.6–45.3)
MCH RBC QN AUTO: 25.8 PG (ref 26.6–33)
MCHC RBC AUTO-ENTMCNC: 30.9 G/DL (ref 31.5–35.7)
MCV RBC AUTO: 83.5 FL (ref 79–97)
MONOCYTES # BLD AUTO: 0.5 10*3/MM3 (ref 0.1–0.9)
MONOCYTES NFR BLD AUTO: 5.3 % (ref 5–12)
NEUTROPHILS NFR BLD AUTO: 6.48 10*3/MM3 (ref 1.7–7)
NEUTROPHILS NFR BLD AUTO: 69 % (ref 42.7–76)
NRBC BLD AUTO-RTO: 0 /100 WBC (ref 0–0.2)
PLATELET # BLD AUTO: 207 10*3/MM3 (ref 140–450)
PMV BLD AUTO: 10.2 FL (ref 6–12)
RBC # BLD AUTO: 3.57 10*6/MM3 (ref 3.77–5.28)
WBC NRBC COR # BLD AUTO: 9.4 10*3/MM3 (ref 3.4–10.8)

## 2024-05-17 PROCEDURE — 85025 COMPLETE CBC W/AUTO DIFF WBC: CPT | Performed by: OBSTETRICS & GYNECOLOGY

## 2024-05-17 PROCEDURE — 0503F POSTPARTUM CARE VISIT: CPT

## 2024-05-17 PROCEDURE — 25010000002 ENOXAPARIN PER 10 MG: Performed by: OBSTETRICS & GYNECOLOGY

## 2024-05-17 RX ORDER — IBUPROFEN 600 MG/1
600 TABLET ORAL EVERY 6 HOURS
Qty: 30 TABLET | Refills: 0 | Status: SHIPPED | OUTPATIENT
Start: 2024-05-17

## 2024-05-17 RX ORDER — OXYCODONE HYDROCHLORIDE 5 MG/1
5 TABLET ORAL EVERY 6 HOURS PRN
Qty: 12 TABLET | Refills: 0 | Status: SHIPPED | OUTPATIENT
Start: 2024-05-17

## 2024-05-17 RX ADMIN — ACETAMINOPHEN 650 MG: 325 TABLET ORAL at 08:47

## 2024-05-17 RX ADMIN — ACETAMINOPHEN 650 MG: 325 TABLET ORAL at 14:21

## 2024-05-17 RX ADMIN — SIMETHICONE 80 MG: 80 TABLET, CHEWABLE ORAL at 11:10

## 2024-05-17 RX ADMIN — ACETAMINOPHEN 650 MG: 325 TABLET ORAL at 02:20

## 2024-05-17 RX ADMIN — DOCUSATE SODIUM 100 MG: 100 CAPSULE, LIQUID FILLED ORAL at 12:36

## 2024-05-17 RX ADMIN — ENOXAPARIN SODIUM 40 MG: 100 INJECTION SUBCUTANEOUS at 07:57

## 2024-05-17 RX ADMIN — PRENATAL VITAMINS-IRON FUMARATE 27 MG IRON-FOLIC ACID 0.8 MG TABLET 1 TABLET: at 10:13

## 2024-05-17 RX ADMIN — IBUPROFEN 600 MG: 600 TABLET, FILM COATED ORAL at 04:55

## 2024-05-17 RX ADMIN — OXYCODONE 5 MG: 5 TABLET ORAL at 12:31

## 2024-05-17 RX ADMIN — IBUPROFEN 600 MG: 600 TABLET, FILM COATED ORAL at 11:09

## 2024-05-17 NOTE — LACTATION NOTE
05/17/24 0815   Maternal Information   Date of Referral 05/17/24   Person Making Referral lactation consultant  (follow up consult. Denies any questions or concerns. Encouraged mom to call lactation services, if there are questions or concerns or if mom wants an outpatient lactation clinic appt. Gave lactation business card.)   Maternal Reason for Referral   (breastfeeding and pumping)   Infant Reason for Referral   (reports baby is doing well at breast and with bottle)

## 2024-05-17 NOTE — DISCHARGE SUMMARY
Discharge Summary    Date of Admission: 2024  Date of Discharge:  2024      Patient: Soni Zarate      MR#:[id number]    Primary Surgeon/OB: Emerita Law MD    Discharge Surgeon/OB: Galo    Presenting Problem/History of Present Illness  Acute abdominal pain [R10.9]       Acute abdominal pain    Previous  section         Discharge Diagnosis:  section at 38w6d    Procedures:  , Classical     2024    3:20 AM          Discharge Date: 2024; Discharge Time: 09:53 EDT    Early Discharge:  NO    Hospital Course  Patient is a 28 y.o. female  at 38w6d status post  section and appendectomy (Dr. Cardenas) for acute abdomen on presentation with uneventful postoperative recovery. Asymptomatic anemia. She reported lochia as mild and pain managed. Patient was advanced to regular diet on postoperative day#1. On discharge, ambulating, tolerating a regular diet without any difficulties and her incision is dry, clean and intact.     Infant:   male  fetus 3311 g (7 lb 4.8 oz)  with Apgar scores of 8 , 9  at five minutes.    Condition on Discharge:  Stable    Vital Signs  Temp:  [98.2 °F (36.8 °C)-98.5 °F (36.9 °C)] 98.5 °F (36.9 °C)  Heart Rate:  [83-89] 83  Resp:  [16-18] 18  BP: (119-133)/(61-63) 126/63    Lab Results   Component Value Date    WBC 9.40 2024    HGB 9.2 (L) 2024    HCT 29.8 (L) 2024    MCV 83.5 2024     2024       Discharge Disposition  Home or Self Care    Discharge Medications     Discharge Medications        New Medications        Instructions Start Date   ibuprofen 600 MG tablet  Commonly known as: ADVIL,MOTRIN   600 mg, Oral, Every 6 Hours      oxyCODONE 5 MG immediate release tablet  Commonly known as: ROXICODONE   5 mg, Oral, Every 6 Hours PRN             Continue These Medications        Instructions Start Date   prenatal (CLASSIC) vitamin 28-0.8 MG tablet tablet  Generic drug: prenatal vitamin    1 tablet, Oral, Daily             Stop These Medications      ferrous sulfate 140 (45 Fe) MG tablet controlled-release tablet     metoclopramide 10 MG tablet  Commonly known as: REGLAN     promethazine 25 MG tablet  Commonly known as: PHENERGAN     ursodiol 300 MG capsule  Commonly known as: ACTIGALL                Activity at Discharge:   Activity Instructions       Driving Restrictions      Type of Restriction: Driving    Driving Restrictions: No Driving (Time Limited)    Length: 2 Weeks    Lifting Restrictions      Type of Restriction: Lifting    Lifting Restrictions: Lifting Restriction (Indicate Limit)    Weight Limit (Pounds): 10    Length of Lifting Restriction: 6 weeks    Pelvic Rest      No Sex, Tampons, Swimming, Tub Baths x 6 weeks            Follow-up Appointments  Future Appointments   Date Time Provider Department Center   8/28/2024  2:45 PM Manuel Azul III, MD [facility]     Additional Instructions for the Follow-ups that You Need to Schedule       Call MD With Problems / Concerns   As directed      Instructions: Call for symptoms related to depression, foul smelling discharge, drainage from incision, fever >/=100.4F, blood clots bigger than a golf ball, saturating through >/= 1 pad per hour.    Order Comments: Instructions: Call for symptoms related to depression, foul smelling discharge, drainage from incision, fever >/=100.4F, blood clots bigger than a golf ball, saturating through >/= 1 pad per hour.         Discharge Follow-up with Specified Provider: Galo   As directed      To: Galo   Follow Up Details: F/U 5/21 or 5/22 for incision check and staple removal                WILEY Crouch  05/17/24  09:53 EDT  Csd

## 2024-05-17 NOTE — PLAN OF CARE
Goal Outcome Evaluation:  Plan of Care Reviewed With: patient        Progress: improving  Outcome Evaluation: VSS, Small lochia.  Incision well approx.  No signs of infection noted.  Pain well controlled with PO meds.  Good bonding with baby noted.  Pt. is breast feeding and pumping and feeding expressed breast milk to baby.

## 2024-05-20 ENCOUNTER — TELEPHONE (OUTPATIENT)
Dept: OBSTETRICS AND GYNECOLOGY | Facility: CLINIC | Age: 29
End: 2024-05-20

## 2024-05-20 NOTE — TELEPHONE ENCOUNTER
Hub staff attempted to follow warm transfer process and was unsuccessful     Caller: Soni Zarate    Relationship to patient: Self    Best call back number: [phone] (home)       Patient is needing: PT CALLED IN TO MAKE PP APPT. DISCHARGE SAID EITHER 5/21 OR 5/22 FOR INCISION CHECK AND STAPLE REMOVAL. NO AVAILABILITY ON HUB END. UNABLE TO WT.

## 2024-05-21 ENCOUNTER — POSTPARTUM VISIT (OUTPATIENT)
Dept: OBSTETRICS AND GYNECOLOGY | Facility: CLINIC | Age: 29
End: 2024-05-21
Payer: COMMERCIAL

## 2024-05-21 VITALS
BODY MASS INDEX: 34.4 KG/M2 | WEIGHT: 227 LBS | HEIGHT: 68 IN | DIASTOLIC BLOOD PRESSURE: 80 MMHG | SYSTOLIC BLOOD PRESSURE: 136 MMHG

## 2024-05-21 DIAGNOSIS — Z48.02 REMOVAL OF STAPLE: ICD-10-CM

## 2024-05-21 DIAGNOSIS — Z98.891 S/P C-SECTION: Primary | ICD-10-CM

## 2024-05-21 RX ORDER — SUMATRIPTAN 100 MG/1
100 TABLET, FILM COATED ORAL SEE ADMIN INSTRUCTIONS
COMMUNITY
Start: 2024-05-18

## 2024-05-21 NOTE — PROGRESS NOTES
"      Chief Complaint   Patient presents with    Postpartum Care     Staple removal       Postpartum Visit         Soni Zaarte is a 28 y.o.  who presents today for a 1 week(s) postpartum check.      C/S:  had appendectomy and abruptio placenta      , Classical    Information for the patient's :  Aman Zarate [[id number]]   2024   male   Aman Zarate   3311 g (7 lb 4.8 oz)   Gestational Age: 38w6d      Baby Discharged with Mom  Delivering MD: Emerita Law MD.    Pregnancy complications: no known issues    Patient reports her incision is clean, dry, intact. Patient describes vaginal bleeding as moderate. She is pumping. She would like to discuss the following complaints today: anxiety.    She desires to discuss her options  for contraception.    Patient reports concerns for postpartum depression/anxiety. Patient denies  suicidal or homicidal ideation. Her postpartum depression screening questionnaire: score=7.  She reports \"very high anxiety\" and \"might need medication.\"      The additional following portions of the patient's history were reviewed and updated as appropriate: allergies, current medications, past medical history, past social history, past surgical history, and problem list.      Review of Systems   Constitutional: Negative.    HENT: Negative.     Eyes: Negative.    Respiratory: Negative.     Cardiovascular: Negative.    Gastrointestinal: Negative.    Endocrine: Negative.    Genitourinary: Negative.    Musculoskeletal: Negative.    Skin: Negative.         Incision pain   Allergic/Immunologic: Negative.    Neurological: Negative.    Hematological: Negative.    Psychiatric/Behavioral: Negative.          Anxiety       I have reviewed and agree with the HPI, ROS, and historical information as entered above. Diana Earl, APRN      Objective   /80   Ht 172.7 cm (68\")   Wt 103 kg (227 lb)   LMP 2023 (Exact Date)   Breastfeeding Yes   " BMI 34.52 kg/m²     Physical Exam  Vitals and nursing note reviewed.   Constitutional:       General: She is not in acute distress.     Appearance: Normal appearance. She is not ill-appearing.   Pulmonary:      Effort: Pulmonary effort is normal. No respiratory distress.   Abdominal:      Comments: [place] intact at vertical incision.  Well approximated without redness or drainage.  Staples removed without difficulty.  Steristrips applied.  Pt tolerated well.   Skin:     General: Skin is warm and dry.   Neurological:      Mental Status: She is alert and oriented to person, place, and time.   Psychiatric:         Mood and Affect: Mood normal.         Behavior: Behavior normal.              Assessment and Plan    Problem List Items Addressed This Visit    None  Visit Diagnoses       S/P     -  Primary    Postpartum follow-up        Removal of staple                S/p , 1 week(s) postpartum.  Doing well.    Continued pelvic rest with a return to driving and light physical activity.  Baby doing well.  Return in about 1 week (around 2024).    Diana Earl, APRN  2024

## 2024-05-28 ENCOUNTER — MATERNAL SCREENING (OUTPATIENT)
Dept: CALL CENTER | Facility: HOSPITAL | Age: 29
End: 2024-05-28
Payer: COMMERCIAL

## 2024-05-28 NOTE — OUTREACH NOTE
Maternal Screening Survey      Flowsheet Row Responses   Facility patient discharged from? [facility]   Attempt successful? No   Unsuccessful attempts Attempt 1  [left msg on vm]              RENZO EVERETT - Registered Nurse

## 2024-05-28 NOTE — OUTREACH NOTE
Maternal Screening Survey      Flowsheet Row Responses   Facility patient discharged from? [place] [facility]   Attempt successful? Yes   Call start time    Call end time    EPD Scale: Able to Laugh 0-->as much as she always could   EPD Scale: Looked Forward 0-->as much as she ever did   EPD Scale: Blamed Self 0-->no, never   EPD Scale: Been Anxious 2-->yes, sometimes   EPD Scale: Felt Panicky 1-->no, not much   EPD Scale: Things Getting on Top 0-->no, has been coping as well as ever   EPD Scale: Difficulty Sleeping 0-->no, not at all   EPD Scale: Sad or Miserable 0-->no, not at all   EPD Scale: Crying 0-->no, never   EPD Scale: Thought of Harming Self 0-->never   [place]  Depression Score 3   Did any of your parents have problems with alcohol or drug use? No   Do any of your peers have problems with alcohol or drug use? No   Does your partner have problems with alcohol or drug use? No   Before you were pregnant did you have problems with alcohol or drug use? (past) No   In the past month, did you drink beer, wine, liquor or use any other drugs? (pregnancy) No   Maternal Screening call completed Yes   Call end time               RENZO EVERETT - Registered Nurse

## 2024-05-30 ENCOUNTER — POSTPARTUM VISIT (OUTPATIENT)
Dept: OBSTETRICS AND GYNECOLOGY | Facility: CLINIC | Age: 29
End: 2024-05-30
Payer: COMMERCIAL

## 2024-05-30 VITALS
DIASTOLIC BLOOD PRESSURE: 78 MMHG | SYSTOLIC BLOOD PRESSURE: 118 MMHG | HEIGHT: 68 IN | WEIGHT: 217 LBS | BODY MASS INDEX: 32.89 KG/M2

## 2024-05-30 NOTE — PROGRESS NOTES
"      Chief Complaint   Patient presents with    Postpartum Care     2wk pp check       Postpartum Visit         Soni Zarate is a 28 y.o.  who presents today for a 2 week(s) postpartum check.      C/S: repeat     , Classical    Information for the patient's :  Aman Zarate [[id number]]   2024   male   Aman Zarate   3311 g (7 lb 4.8 oz)   Gestational Age: 38w6d      Baby Discharged with Mom  Delivering MD: Emerita Law MD.    Pregnancy complications:  placenta abruption    Patient reports her incision is clean, dry, intact. Patient describes vaginal bleeding as light. She is  breast and bottle feeding from pumping . She would like to discuss the following complaints today: daily headaches 3-4/10 pain level.    PT will discuss contraception at 6 week visit.    Patient denies concerns for postpartum depression/anxiety. Patient denies  suicidal or homicidal ideation. Her postpartum depression screening questionnaire: 3. No treatment is indicated      The additional following portions of the patient's history were reviewed and updated as appropriate: allergies, current medications, past family history, past medical history, past social history, past surgical history, and problem list.      Review of Systems   Genitourinary:  Positive for vaginal pain.   Skin:  Positive for wound.       I have reviewed and agree with the HPI, ROS, and historical information as entered above. Diana Earl, APRN      Objective   /78   Ht 172.7 cm (68\")   Wt 98.4 kg (217 lb)   LMP 2023 (Exact Date)   Breastfeeding Yes   BMI 32.99 kg/m²     Physical Exam  Vitals and nursing note reviewed.   Constitutional:       General: She is not in acute distress.     Appearance: Normal appearance. She is not ill-appearing.   Pulmonary:      Effort: Pulmonary effort is normal. No respiratory distress.   Abdominal:      General: There is no distension.      Palpations: Abdomen is " soft. There is no mass.      Tenderness: There is no abdominal tenderness. There is no guarding or rebound.      Hernia: No hernia is present.      Comments: Vertical incision healing well.  Well approximated without redness or drainage   Skin:     General: Skin is warm and dry.   Neurological:      Mental Status: She is alert and oriented to person, place, and time.   Psychiatric:         Mood and Affect: Mood normal.         Behavior: Behavior normal.              Assessment and Plan    Problem List Items Addressed This Visit    None  Visit Diagnoses       Postpartum follow-up    -  Primary            S/p , 2 week(s) postpartum.  Doing well.    Continued pelvic rest with a return to driving and light physical activity.  Baby doing well.  No si/sx of postpartum depression  Keep incision clean and dry  Return in about 4 weeks (around 2024) for pp.    Diana Earl, APRN  2024

## 2024-06-26 ENCOUNTER — POSTPARTUM VISIT (OUTPATIENT)
Dept: OBSTETRICS AND GYNECOLOGY | Facility: CLINIC | Age: 29
End: 2024-06-26
Payer: COMMERCIAL

## 2024-06-26 VITALS
WEIGHT: 220 LBS | BODY MASS INDEX: 33.34 KG/M2 | HEIGHT: 68 IN | DIASTOLIC BLOOD PRESSURE: 68 MMHG | SYSTOLIC BLOOD PRESSURE: 124 MMHG

## 2024-06-26 PROCEDURE — 0503F POSTPARTUM CARE VISIT: CPT | Performed by: OBSTETRICS & GYNECOLOGY

## 2024-06-26 NOTE — PROGRESS NOTES
Chief Complaint   Patient presents with    Postpartum Care       Postpartum Visit         Soni Zarate is a 28 y.o.  who presents today for a 6 week(s) postpartum check.     C/S: repeat     , Classical    Information for the patient's :  Aman Zarate [[id number]]   2024   male   Aman Zarate   3311 g (7 lb 4.8 oz)   Gestational Age: 38w6d      Baby Discharged: Discharged with Mom  Delivering Physician: Emerita Law MD    Her pregnancy was complicated by  Abdominal pain and Appendicitis prior to delivery . The incision is healing well. Patient describes vaginal bleeding as light. Patient is  pumping and breast feeding . She desires  condoms  for contraception.      She would like to discuss the following complaints today: no complaints.    Patient denies concerns for postpartum depression/anxiety. Patient denies  suicidal or homicidal ideation. Her postpartum depression screening questionnaire: 0. No treatment is indicated      Last Pap : 11/10/2023. Results: negative. HPV: negative.   Last Completed Pap Smear            PAP SMEAR (Every 3 Years) Next due on 11/10/2026      11/10/2023  LIQUID-BASED PAP SMEAR WITH HPV GENOTYPING REGARDLESS OF INTERPRETATION ([facility])                      The additional following portions of the patient's history were reviewed and updated as appropriate: allergies and current medications.    Review of Systems   Constitutional: Negative.    HENT: Negative.     Eyes: Negative.    Respiratory: Negative.     Cardiovascular: Negative.    Gastrointestinal: Negative.    Endocrine: Negative.    Genitourinary:  Positive for vaginal bleeding.   Musculoskeletal: Negative.    Skin: Negative.    Allergic/Immunologic: Negative.    Neurological: Negative.    Hematological: Negative.    Psychiatric/Behavioral: Negative.       All other systems reviewed and are negative.     I have reviewed and agree with the HPI, ROS, and  "historical information as entered above. Chelle Rosenthal MD      /68   Ht 172.7 cm (68\")   Wt 99.8 kg (220 lb)   LMP 2023 (Exact Date)   Breastfeeding Yes   BMI 33.45 kg/m²     Physical Exam  PHYSICAL EXAM:    Abdomen: +BS, benign, no masses, soft, non-tender.  Incision: yes Well-healed, clean, dry, intact.  Bimanual exam: deferred  Extremities: No deep calf tenderness.       Assessment and Plan    Problem List Items Addressed This Visit       6 weeks postpartum follow-up - Primary       S/p , 6 week(s) postpartum.  Doing well.    Return to normal physical activity.  No pelvic restrictions.   Baby doing well.  Breastfeeding going well.  No si/sx of postpartum depression  Lactation information given  Contraception: contraceptive methods: Condoms  Handout given for Mirena  Return in about 1 year (around 2025) for Appropriate for followup with NP as desired..     Chelle Rosenthal MD  2024   "

## 2024-07-31 NOTE — TELEPHONE ENCOUNTER
Caller: Soni Zarate    Relationship: Self    Best call back number: [phone]     Requested Prescriptions:   Requested Prescriptions     Pending Prescriptions Disp Refills    SUMAtriptan (IMITREX) 100 MG tablet       Sig: Take 1 tablet by mouth See Admin Instructions. Take 1 tablet by mouth at onset of headache. Seema repeat dose 1 time in 2 hours if headache not relieved.        Pharmacy where request should be sent: [id number] - [facility] [address] [place] [address] AT [facility] ([facility] & [facility] [phone] [facility] [phone] FX     Last office visit with prescribing clinician: 5/31/2023   Last telemedicine visit with prescribing clinician: Visit date not found   Next office visit with prescribing clinician: Visit date not found     Additional details provided by patient: PATIENT OUT OF MEDICATION    Does the patient have less than a 3 day supply:  [x] Yes  [] No    Would you like a call back once the refill request has been completed: [] Yes [x] No    If the office needs to give you a call back, can they leave a voicemail: [] Yes [x] No    Hannah Gonzalez   07/31/24 12:05 EDT

## 2024-08-02 RX ORDER — SUMATRIPTAN 100 MG/1
100 TABLET, FILM COATED ORAL SEE ADMIN INSTRUCTIONS
Qty: 9 TABLET | Refills: 0 | Status: SHIPPED | OUTPATIENT
Start: 2024-08-02

## 2024-09-06 RX ORDER — SUMATRIPTAN 100 MG/1
100 TABLET, FILM COATED ORAL SEE ADMIN INSTRUCTIONS
Qty: 9 TABLET | Refills: 0 | Status: SHIPPED | OUTPATIENT
Start: 2024-09-06

## 2024-09-06 NOTE — TELEPHONE ENCOUNTER
Caller: Soni Zarate    Relationship: Self    Best call back number [phone]     Requested Prescriptions:   Requested Prescriptions     Pending Prescriptions Disp Refills    SUMAtriptan (IMITREX) 100 MG tablet 9 tablet 0     Sig: Take 1 tablet by mouth See Admin Instructions. Take 1 tablet by mouth at onset of headache. Seema repeat dose 1 time in 2 hours if headache not relieved.        Pharmacy where request should be sent: [id number] - [address] AT [facility] & [facility] [phone] [facility] [phone] FX     Last office visit with prescribing clinician: 5/31/2023   Last telemedicine visit with prescribing clinician: Visit date not found   Next office visit with prescribing clinician: Visit date not found     Additional details provided by patient:     Does the patient have less than a 3 day supply:  [x] Yes  [] No    Would you like a call back once the refill request has been completed: [] Yes [x] No    If the office needs to give you a call back, can they leave a voicemail: [] Yes [x] No    Maycol Pickens Rep   09/06/24 15:41 EDT

## 2024-09-30 ENCOUNTER — OFFICE VISIT (OUTPATIENT)
Dept: INTERNAL MEDICINE | Facility: CLINIC | Age: 29
End: 2024-09-30
Payer: COMMERCIAL

## 2024-09-30 VITALS
HEIGHT: 68 IN | BODY MASS INDEX: 35.8 KG/M2 | WEIGHT: 236.2 LBS | SYSTOLIC BLOOD PRESSURE: 122 MMHG | HEART RATE: 84 BPM | TEMPERATURE: 97.8 F | DIASTOLIC BLOOD PRESSURE: 82 MMHG | RESPIRATION RATE: 14 BRPM

## 2024-09-30 DIAGNOSIS — G43.009 MIGRAINE WITHOUT AURA AND WITHOUT STATUS MIGRAINOSUS, NOT INTRACTABLE: Primary | ICD-10-CM

## 2024-09-30 PROCEDURE — 99213 OFFICE O/P EST LOW 20 MIN: CPT | Performed by: INTERNAL MEDICINE

## 2024-09-30 RX ORDER — SUMATRIPTAN 100 MG/1
100 TABLET, FILM COATED ORAL SEE ADMIN INSTRUCTIONS
Qty: 9 TABLET | Refills: 5 | Status: SHIPPED | OUTPATIENT
Start: 2024-09-30

## 2024-09-30 NOTE — PROGRESS NOTES
Subjective       Soni Zarate is a 28 y.o. female.     Chief Complaint   Patient presents with    Headache     Follow up       History obtained from the patient.      History of Present Illness     This is a patient of Lorelei Vann.  She was last seen on 5/31/2023.    She is here for follow up of Migraine Headaches, which are stable.  She reports her migraines occur 3-4 times per month, and last 2 to 3 days.  She tends to get the headaches at the beginning or end of her menses, and with stress..  She takes Imitrex as needed with significant relief.  She often has to take a second.  She has been on Topamax in the past which caused stomach pain.  She does not have an aura, but her migraines do start with a left earache.  They progressed to the entire left side of her head.  She reports associated symptoms of photophobia and phonophobia.  She has nausea and vomiting if she does not start treatment quickly.  She denies lightheadedness, dizziness, weakness, and visual changes.      Current Outpatient Medications on File Prior to Visit   Medication Sig Dispense Refill    ibuprofen (ADVIL,MOTRIN) 600 MG tablet Take 1 tablet by mouth Every 6 (Six) Hours. 30 tablet 0    [DISCONTINUED] SUMAtriptan (IMITREX) 100 MG tablet Take 1 tablet by mouth See Admin Instructions. Take 1 tablet by mouth at onset of headache. Seema repeat dose 1 time in 2 hours if headache not relieved. 9 tablet 0    [DISCONTINUED] prenatal vitamin (prenatal, CLASSIC, vitamin) tablet Take 1 tablet by mouth Daily.       No current facility-administered medications on file prior to visit.       Current outpatient and discharge medications have been reconciled for the patient.  Reviewed by: Cassie Westbrook MD        The following portions of the patient's history were reviewed and updated as appropriate: allergies, current medications, past family history, past medical history, past social history, past surgical history, and problem list.    Review of  "Systems   Eyes:  Positive for photophobia. Negative for visual disturbance.   Gastrointestinal:  Negative for nausea.   Neurological:  Positive for headaches. Negative for dizziness, weakness and light-headedness.         Objective       Blood pressure 122/82, pulse 84, temperature 97.8 °F (36.6 °C), temperature source Infrared, resp. rate 14, height 172.7 cm (68\"), weight 107 kg (236 lb 3.2 oz), last menstrual period 09/18/2024, not currently breastfeeding.  Body mass index is 35.91 kg/m².      Physical Exam  Vitals and nursing note reviewed.   Constitutional:       Appearance: She is well-developed and normal weight.   HENT:      Head: Normocephalic and atraumatic.      Mouth/Throat:      Mouth: Mucous membranes are moist.      Pharynx: Oropharynx is clear.   Eyes:      Extraocular Movements: Extraocular movements intact.      Conjunctiva/sclera: Conjunctivae normal.      Pupils: Pupils are equal, round, and reactive to light.   Neck:      Vascular: No carotid bruit.   Cardiovascular:      Rate and Rhythm: Normal rate.      Heart sounds: Normal heart sounds. No murmur heard.  Pulmonary:      Effort: Pulmonary effort is normal.      Breath sounds: Normal breath sounds.   Musculoskeletal:         General: Normal range of motion.      Cervical back: Normal range of motion and neck supple.   Skin:     Findings: No rash.   Neurological:      Mental Status: She is alert and oriented to person, place, and time.      Cranial Nerves: No cranial nerve deficit.      Motor: Motor function is intact.      Gait: Gait normal.      Deep Tendon Reflexes: Reflexes are normal and symmetric.   Psychiatric:         Mood and Affect: Mood normal.         Assessment / Plan:  Diagnoses and all orders for this visit:    1. Migraine without aura and without status migrainosus, not intractable (Primary)  -     SUMAtriptan (IMITREX) 100 MG tablet; Take 1 tablet by mouth See Admin Instructions. Take 1 tablet by mouth at onset of headache. Seema" repeat dose 1 time in 2 hours if headache not relieved.  Dispense: 9 tablet; Refill: 5- REFILL  The patient declined starting a maintenance medication today.      Return for Annual physical, fasting- NEEDS NEW PCP wants [facility], fasting.

## 2024-10-02 DIAGNOSIS — G43.009 MIGRAINE WITHOUT AURA AND WITHOUT STATUS MIGRAINOSUS, NOT INTRACTABLE: ICD-10-CM

## 2024-10-02 RX ORDER — SUMATRIPTAN 100 MG/1
100 TABLET, FILM COATED ORAL SEE ADMIN INSTRUCTIONS
Qty: 9 TABLET | Refills: 5 | OUTPATIENT
Start: 2024-10-02

## 2024-10-02 NOTE — TELEPHONE ENCOUNTER
Caller: Soni Zarate    Relationship: Self    Best call back number: [phone]     Requested Prescriptions:   Requested Prescriptions     Pending Prescriptions Disp Refills    SUMAtriptan (IMITREX) 100 MG tablet 9 tablet 5     Sig: Take 1 tablet by mouth See Admin Instructions. Take 1 tablet by mouth at onset of headache. Seema repeat dose 1 time in 2 hours if headache not relieved.        Pharmacy where request should be sent: [id number] [address] AT [facility] & [facility] [phone] [facility] [phone]      Last office visit with prescribing clinician: Visit date not found   Last telemedicine visit with prescribing clinician: Visit date not found   Next office visit with prescribing clinician: 11/7/2024     Additional details provided by patient: PATIENT IS OUT    Does the patient have less than a 3 day supply:  [x] Yes  [] No        Maycol Figueredo Rep   10/02/24 14:30 EDT

## 2024-11-07 ENCOUNTER — OFFICE VISIT (OUTPATIENT)
Dept: INTERNAL MEDICINE | Facility: CLINIC | Age: 29
End: 2024-11-07
Payer: COMMERCIAL

## 2024-11-07 VITALS
DIASTOLIC BLOOD PRESSURE: 70 MMHG | BODY MASS INDEX: 35.16 KG/M2 | TEMPERATURE: 97.8 F | HEIGHT: 68 IN | SYSTOLIC BLOOD PRESSURE: 122 MMHG | HEART RATE: 78 BPM | RESPIRATION RATE: 18 BRPM | WEIGHT: 232 LBS

## 2024-11-07 DIAGNOSIS — E66.09 CLASS 2 OBESITY DUE TO EXCESS CALORIES WITHOUT SERIOUS COMORBIDITY WITH BODY MASS INDEX (BMI) OF 35.0 TO 35.9 IN ADULT: ICD-10-CM

## 2024-11-07 DIAGNOSIS — Z13.220 LIPID SCREENING: ICD-10-CM

## 2024-11-07 DIAGNOSIS — J06.9 ACUTE URI: ICD-10-CM

## 2024-11-07 DIAGNOSIS — G43.009 MIGRAINE WITHOUT AURA AND WITHOUT STATUS MIGRAINOSUS, NOT INTRACTABLE: ICD-10-CM

## 2024-11-07 DIAGNOSIS — Z00.00 ANNUAL PHYSICAL EXAM: Primary | ICD-10-CM

## 2024-11-07 DIAGNOSIS — H93.13 TINNITUS OF BOTH EARS: ICD-10-CM

## 2024-11-07 DIAGNOSIS — R00.0 SINUS TACHYCARDIA: ICD-10-CM

## 2024-11-07 DIAGNOSIS — Z13.29 THYROID DISORDER SCREEN: ICD-10-CM

## 2024-11-07 DIAGNOSIS — Z13.1 ENCOUNTER FOR SCREENING EXAMINATION FOR IMPAIRED GLUCOSE REGULATION AND DIABETES MELLITUS: ICD-10-CM

## 2024-11-07 DIAGNOSIS — E66.812 CLASS 2 OBESITY DUE TO EXCESS CALORIES WITHOUT SERIOUS COMORBIDITY WITH BODY MASS INDEX (BMI) OF 35.0 TO 35.9 IN ADULT: ICD-10-CM

## 2024-11-07 DIAGNOSIS — R73.9 INCREASED BLOOD GLUCOSE: ICD-10-CM

## 2024-11-07 LAB
ALBUMIN SERPL-MCNC: 4.3 G/DL (ref 3.5–5.2)
ALBUMIN/GLOB SERPL: 1.5 G/DL
ALP SERPL-CCNC: 95 U/L (ref 39–117)
ALT SERPL W P-5'-P-CCNC: 25 U/L (ref 1–33)
ANION GAP SERPL CALCULATED.3IONS-SCNC: 10.8 MMOL/L (ref 5–15)
AST SERPL-CCNC: 21 U/L (ref 1–32)
BASOPHILS # BLD AUTO: 0.04 10*3/MM3 (ref 0–0.2)
BASOPHILS NFR BLD AUTO: 0.4 % (ref 0–1.5)
BILIRUB BLD-MCNC: NEGATIVE MG/DL
BILIRUB SERPL-MCNC: 0.3 MG/DL (ref 0–1.2)
BUN SERPL-MCNC: 15 MG/DL (ref 6–20)
BUN/CREAT SERPL: 16.5 (ref 7–25)
CALCIUM SPEC-SCNC: 9.2 MG/DL (ref 8.6–10.5)
CHLORIDE SERPL-SCNC: 103 MMOL/L (ref 98–107)
CHOLEST SERPL-MCNC: 212 MG/DL (ref 0–200)
CLARITY, POC: CLEAR
CO2 SERPL-SCNC: 25.2 MMOL/L (ref 22–29)
COLOR UR: YELLOW
CREAT SERPL-MCNC: 0.91 MG/DL (ref 0.57–1)
DEPRECATED RDW RBC AUTO: 37.7 FL (ref 37–54)
EGFRCR SERPLBLD CKD-EPI 2021: 86.7 ML/MIN/1.73
EOSINOPHIL # BLD AUTO: 0.26 10*3/MM3 (ref 0–0.4)
EOSINOPHIL NFR BLD AUTO: 2.4 % (ref 0.3–6.2)
ERYTHROCYTE [DISTWIDTH] IN BLOOD BY AUTOMATED COUNT: 13.3 % (ref 12.3–15.4)
EXPIRATION DATE: NORMAL
GLOBULIN UR ELPH-MCNC: 2.8 GM/DL
GLUCOSE SERPL-MCNC: 105 MG/DL (ref 65–99)
GLUCOSE UR STRIP-MCNC: NEGATIVE MG/DL
HCT VFR BLD AUTO: 39.7 % (ref 34–46.6)
HDLC SERPL-MCNC: 39 MG/DL (ref 40–60)
HGB BLD-MCNC: 12.2 G/DL (ref 12–15.9)
IMM GRANULOCYTES # BLD AUTO: 0.04 10*3/MM3 (ref 0–0.05)
IMM GRANULOCYTES NFR BLD AUTO: 0.4 % (ref 0–0.5)
KETONES UR QL: NEGATIVE
LDLC SERPL CALC-MCNC: 143 MG/DL (ref 0–100)
LDLC/HDLC SERPL: 3.6 {RATIO}
LEUKOCYTE EST, POC: NEGATIVE
LYMPHOCYTES # BLD AUTO: 2.26 10*3/MM3 (ref 0.7–3.1)
LYMPHOCYTES NFR BLD AUTO: 21.2 % (ref 19.6–45.3)
Lab: NORMAL
MCH RBC QN AUTO: 24.4 PG (ref 26.6–33)
MCHC RBC AUTO-ENTMCNC: 30.7 G/DL (ref 31.5–35.7)
MCV RBC AUTO: 79.4 FL (ref 79–97)
MONOCYTES # BLD AUTO: 0.57 10*3/MM3 (ref 0.1–0.9)
MONOCYTES NFR BLD AUTO: 5.3 % (ref 5–12)
NEUTROPHILS NFR BLD AUTO: 7.49 10*3/MM3 (ref 1.7–7)
NEUTROPHILS NFR BLD AUTO: 70.3 % (ref 42.7–76)
NITRITE UR-MCNC: NEGATIVE MG/ML
NRBC BLD AUTO-RTO: 0 /100 WBC (ref 0–0.2)
PH UR: 5 [PH] (ref 5–8)
PLATELET # BLD AUTO: 252 10*3/MM3 (ref 140–450)
PMV BLD AUTO: 11.1 FL (ref 6–12)
POTASSIUM SERPL-SCNC: 3.5 MMOL/L (ref 3.5–5.2)
PROT SERPL-MCNC: 7.1 G/DL (ref 6–8.5)
PROT UR STRIP-MCNC: NEGATIVE MG/DL
RBC # BLD AUTO: 5 10*6/MM3 (ref 3.77–5.28)
RBC # UR STRIP: NEGATIVE /UL
SODIUM SERPL-SCNC: 139 MMOL/L (ref 136–145)
SP GR UR: 1.02 (ref 1–1.03)
TRIGL SERPL-MCNC: 163 MG/DL (ref 0–150)
TSH SERPL DL<=0.05 MIU/L-ACNC: 0.93 UIU/ML (ref 0.27–4.2)
UROBILINOGEN UR QL: NORMAL
VLDLC SERPL-MCNC: 30 MG/DL (ref 5–40)
WBC NRBC COR # BLD AUTO: 10.66 10*3/MM3 (ref 3.4–10.8)

## 2024-11-07 PROCEDURE — 80050 GENERAL HEALTH PANEL: CPT | Performed by: NURSE PRACTITIONER

## 2024-11-07 PROCEDURE — 99213 OFFICE O/P EST LOW 20 MIN: CPT | Performed by: NURSE PRACTITIONER

## 2024-11-07 PROCEDURE — 99395 PREV VISIT EST AGE 18-39: CPT | Performed by: NURSE PRACTITIONER

## 2024-11-07 PROCEDURE — 81003 URINALYSIS AUTO W/O SCOPE: CPT | Performed by: NURSE PRACTITIONER

## 2024-11-07 PROCEDURE — 80061 LIPID PANEL: CPT | Performed by: NURSE PRACTITIONER

## 2024-11-07 RX ORDER — IPRATROPIUM BROMIDE 42 UG/1
2 SPRAY, METERED NASAL 4 TIMES DAILY
Qty: 15 ML | Refills: 6 | Status: SHIPPED | OUTPATIENT
Start: 2024-11-07

## 2024-11-07 NOTE — PROGRESS NOTES
Chief Complaint  Annual Exam (Establish new provider./Pt is having ringing in the ear.  )    Subjective          Soni Zarate presents to [facility]  History of Present Illness  History of Present Illness  The patient is a 29-year-old female who presents for a physical exam.    She has a history of abnormal Pap smear during her first pregnancy in 2018, but her most recent Pap smear five months ago was normal. She experienced anemia during her pregnancies, which was managed with iron supplements, specifically Slow Fe twice daily. Her last childbirth was in May 2024.    She has a history of anxiety and depression but is currently not on any medication and managing well.    She had kidney stones in 2023, just before her son's birth, and has had kidney stone surgery. She has no new abdominal pain, blood in urine or stool, excessive bruising, gum bleeding, or skin changes.    She suffers from migraines, which are effectively managed with sumatriptan. She has not consulted a neurologist or undergone a brain CT scan. She also has a history of supraventricular tachycardia and underwent an ablation at age 13. She is under the care of a cardiologist, Dr. Manuel Azul, whom she last saw at 24 to 25 weeks of her pregnancy and has a follow-up appointment scheduled. She has no new symptoms related to her heart.    She wears glasses and has seen her dentist and eye doctor in the past year. She reports no new headaches, dizziness, visual changes, chest pain, shortness of breath, or swelling.    She has a dermatology appointment next week for a couple of moles. She has a rough, patchy mole and hard lumps under her skin on her back and arm. She has a large brown mole on her back that her mother believes has darkened. She is covered in freckles.    She is trying to lose weight and is no longer breastfeeding. She has cold symptoms and takes Tylenol and ibuprofen as needed. She had  "a breast exam and mammogram with her OB-GYN, which revealed a cyst in her left breast.    She has been experiencing increased ringing in her ears for the past 3 to 4 months, which has become so loud that she cannot sit comfortably in a quiet room.    SOCIAL HISTORY  She is a never smoker. She does not drink alcohol. She does not use illicit drugs. She does not vape. She works at the hospital.    FAMILY HISTORY  Her mother is alive and well. She has anxiety, depression, type 2 diabetes, high blood pressure, mental illness, thyroid disease, and migraines. Her father has type 2 diabetes, high cholesterol, and mental illness. Her brother has asthma. Her maternal grandfather  from heart failure at age 56.    ALLERGIES  She has no known drug allergies.    Vital Signs:   /70 (BP Location: Right arm, Patient Position: Sitting, Cuff Size: Adult)   Pulse 78   Temp 97.8 °F (36.6 °C) (Infrared)   Resp 18   Ht 172.7 cm (68\")   Wt 105 kg (232 lb)   BMI 35.28 kg/m²     Physical Exam  Vitals and nursing note reviewed.   Constitutional:       General: She is not in acute distress.     Appearance: Normal appearance. She is well-developed. She is not ill-appearing.   HENT:      Head: Normocephalic and atraumatic.      Right Ear: External ear normal.      Left Ear: External ear normal.      Ears:      Comments: Trace fluid B     Nose: Congestion present.      Mouth/Throat:      Pharynx: No oropharyngeal exudate or posterior oropharyngeal erythema.      Comments: Clear pnd  Eyes:      General: No scleral icterus.        Right eye: No discharge.         Left eye: No discharge.      Conjunctiva/sclera: Conjunctivae normal.      Pupils: Pupils are equal, round, and reactive to light.   Neck:      Thyroid: No thyromegaly.      Vascular: No carotid bruit.   Cardiovascular:      Rate and Rhythm: Normal rate and regular rhythm.      Heart sounds: Normal heart sounds. No murmur heard.     No friction rub. No gallop.   Pulmonary: "      Effort: Pulmonary effort is normal.      Breath sounds: Normal breath sounds.   Abdominal:      General: Bowel sounds are normal. There is no distension.      Palpations: Abdomen is soft. There is no mass.      Tenderness: There is no abdominal tenderness. There is no guarding or rebound.      Hernia: No hernia is present.   Musculoskeletal:         General: Normal range of motion.      Cervical back: Normal range of motion and neck supple.   Lymphadenopathy:      Head:      Right side of head: No submental, submandibular, tonsillar, preauricular, posterior auricular or occipital adenopathy.      Left side of head: No submental, submandibular, tonsillar, preauricular, posterior auricular or occipital adenopathy.      Cervical: No cervical adenopathy.      Right cervical: No superficial, deep or posterior cervical adenopathy.     Left cervical: No superficial, deep or posterior cervical adenopathy.      Upper Body:      Right upper body: No supraclavicular, axillary, pectoral or epitrochlear adenopathy.      Left upper body: No supraclavicular, axillary, pectoral or epitrochlear adenopathy.      Lower Body: No right inguinal adenopathy. No left inguinal adenopathy.   Skin:     General: Skin is warm and dry.      Capillary Refill: Capillary refill takes 2 to 3 seconds.   Neurological:      Mental Status: She is alert and oriented to person, place, and time.      Deep Tendon Reflexes: Reflexes normal.   Psychiatric:         Behavior: Behavior normal.         Thought Content: Thought content normal.         Judgment: Judgment normal.        Result Review :                 Assessment and Plan    Diagnoses and all orders for this visit:    1. Annual physical exam (Primary)  -     CBC & Differential; Future  -     CBC & Differential    2. Migraine without aura and without status migrainosus, not intractable  -     CBC & Differential; Future  -     CBC & Differential    3. Sinus tachycardia  -     CBC & Differential;  Future  -     CBC & Differential    4. Class 2 obesity due to excess calories without serious comorbidity with body mass index (BMI) of 35.0 to 35.9 in adult  -     CBC & Differential; Future  -     CBC & Differential    5. Acute URI  -     CBC & Differential; Future  -     CBC & Differential    6. Tinnitus of both ears  -     ipratropium (ATROVENT) 0.06 % nasal spray; Administer 2 sprays into the nostril(s) as directed by provider 4 (Four) Times a Day.  Dispense: 15 mL; Refill: 6  -     CBC & Differential; Future  -     CBC & Differential    7. Thyroid disorder screen  -     CBC & Differential; Future  -     TSH; Future  -     CBC & Differential  -     TSH    8. Lipid screening  -     CBC & Differential; Future  -     Lipid Panel; Future  -     CBC & Differential  -     Lipid Panel    9. Encounter for screening examination for impaired glucose regulation and diabetes mellitus  -     CBC & Differential; Future  -     Comprehensive Metabolic Panel; Future  -     POC Urinalysis Dipstick, Automated; Future  -     CBC & Differential  -     Comprehensive Metabolic Panel  -     POC Urinalysis Dipstick, Automated    10. Increased blood glucose  -     Hemoglobin A1c; Future      Assessment & Plan  1. Physical Examination.  Labs will be ordered to assess her complete blood count, thyroid function, cholesterol levels, urinalysis for protein or blood, chemistry panel including kidney function, liver function, electrolytes, and blood glucose. She is advised to maintain oral hygiene through regular brushing and flossing, use of seatbelt, application of sunscreen, and wearing sunglasses. Monthly self-breast exams in the shower are recommended.     2. Anxiety and Depression.  Currently not on any medications and doing okay with her anxiety and depression. No new symptoms reported.    3. History of Anemia.  Previously on iron supplements during pregnancy but discontinued due to gastrointestinal side effects. No current treatment  needed.    4. Kidney Stones.  Last episode in 2023. No current symptoms reported.     5. Migraines.  Currently managed with sumatriptan, which is effective. No new symptoms reported.    6. Supraventricular Tachycardia.  History of ablation at age 13. Follow-up with cardiologist Dr. Manuel [facility] at [facility] is scheduled soon. No new symptoms reported.    7. Tinnitus.  Experiencing increased ringing in the ears over the last three to four months. A nasal steroid inhaler will be prescribed. If symptoms persist, an ENT appointment will be arranged.    8. Dermatological Concerns.  Has an upcoming appointment with dermatology to check moles on her back, arm, and other areas. No immediate treatment needed.          Class 2 Severe Obesity (BMI >=35 and <=39.9). Obesity-related health conditions include the following: none. Obesity is unchanged. BMI is is above average; BMI management plan is completed. We discussed portion control and increasing exercise.      Patient education regarding the importance of avoidance of texting while driving and the need for wearing seatbelt.  Use of sunscreen, use of helmets while on bikes.  The appropriate amounts of sleep and H20 consumption per day was reviewed with pt.  The need for healthy well-balanced diet based off the food guide pyramid and avoidance of skipping meals along with following a NCS diet with appropriate amounts of fats, protein, and carbohydrate and low sodium diet. Encouraging 30minutes of cardio 5d weekly and muscle strengthening 3x weekly.           Depression: PHQ-2/9 Depression Screening  Little interest or pleasure in doing things?  0   Feeling down, depressed, or hopeless?  0   PHQ-2 Total Score     PHQ-9 Total Score  0        AWV: na  A1C:   Lab Results   Component Value Date    HGBA1C 5.8 (H) 10/06/2023      ACP: Advance Care Planning   ACP discussion was held with the patient during this visit. Patient does not have an advance directive, information provided.        Follow Up   Return in about 1 year (around 11/7/2025) for Annual, fasting.  Patient was given instructions and counseling regarding her condition or for health maintenance advice. Please see specific information pulled into the AVS if appropriate.     RTC/call  If symptoms worsen  Meds MOA and SE's reviewed and pt v/u    11/07/24   09:10 EST    Patient or patient representative verbalized consent to the visit recording.  I have personally performed the services described in this document as transcribed by the above individual, and it is both accurate and complete.

## 2024-11-07 NOTE — PATIENT INSTRUCTIONS
MyPlate from USDA    MyPlate is an outline of a general healthy diet based on the Dietary Guidelines for Americans, 9572-0742, from the U.S. Department of Agriculture (USDA). It sets guidelines for how much food you should eat from each food group based on your age, sex, and level of physical activity.  What are tips for following MyPlate?  To follow MyPlate recommendations:  Eat a wide variety of fruits and vegetables, grains, and protein foods.  Serve smaller portions and eat less food throughout the day.  Limit portion sizes to avoid overeating.  Enjoy your food.  Get at least 150 minutes of exercise every week. This is about 30 minutes each day, 5 or more days per week.  It can be difficult to have every meal look like MyPlate. Think about MyPlate as eating guidelines for an entire day, rather than each individual meal.  Fruits and vegetables  Make one half of your plate fruits and vegetables.  Eat many different colors of fruits and vegetables each day.  For a 2,000-calorie daily food plan, eat:  2½ cups of vegetables every day.  2 cups of fruit every day.  1 cup is equal to:  1 cup raw or cooked vegetables.  1 cup raw fruit.  1 medium-sized orange, apple, or banana.  1 cup 100% fruit or vegetable juice.  2 cups raw leafy greens, such as lettuce, spinach, or kale.  ½ cup dried fruit.  Grains  One fourth of your plate should be grains.  Make at least half of the grains you eat each day whole grains.  For a 2,000-calorie daily food plan, eat 6 oz of grains every day.  1 oz is equal to:  1 slice bread.  1 cup cereal.  ½ cup cooked rice, cereal, or pasta.  Protein  One fourth of your plate should be protein.  Eat a wide variety of protein foods, including meat, poultry, fish, eggs, beans, nuts, and tofu.  For a 2,000-calorie daily food plan, eat 5½ oz of protein every day.  1 oz is equal to:  1 oz meat, poultry, or fish.  ¼ cup cooked beans.  1 egg.  ½ oz nuts or seeds.  1 Tbsp peanut butter.  Dairy  Drink fat-free  or low-fat (1%) milk.  Eat or drink dairy as a side to meals.  For a 2,000-calorie daily food plan, eat or drink 3 cups of dairy every day.  1 cup is equal to:  1 cup milk, yogurt, cottage cheese, or soy milk (soy beverage).  2 oz processed cheese.  1½ oz natural cheese.  Fats, oils, salt, and sugars  Only small amounts of oils are recommended.  Avoid foods that are high in calories and low in nutritional value (empty calories), like foods high in fat or added sugars.  Choose foods that are low in salt (sodium). Choose foods that have less than 140 milligrams (mg) of sodium per serving.  Drink water instead of sugary drinks. Drink enough fluid to keep your urine pale yellow.  Where to find support  Work with your health care provider or a dietitian to develop a customized eating plan that is right for you.  Download an narendra (mobile application) to help you track your daily food intake.  Where to find more information  USDA: ChooseMyPlate.gov  Summary  MyPlate is a general guideline for healthy eating from the USDA. It is based on the Dietary Guidelines for Americans, 2168-0323.  In general, fruits and vegetables should take up one half of your plate, grains should take up one fourth of your plate, and protein should take up one fourth of your plate.  This information is not intended to replace advice given to you by your health care provider. Make sure you discuss any questions you have with your health care provider.  Document Revised: 11/08/2021 Document Reviewed: 11/08/2021  [employer] Patient Education © 2024 [employer]    Advance Care Planning and Advance Directives     You make decisions on a daily basis - decisions about where you want to live, your career, your home, your life. Perhaps one of the most important decisions you face is your choice for future medical care. Take time to talk with your family and your healthcare team and start planning today.  Advance Care Planning is a process that can help  you:  Understand possible future healthcare decisions in light of your own experiences  Reflect on those decision in light of your goals and values  Discuss your decisions with those closest to you and the healthcare professionals that care for you  Make a plan by creating a document that reflects your wishes    Surrogate Decision Maker  In the event of a medical emergency, which has left you unable to communicate or to make your own decisions, you would need someone to make decisions for you.  It is important to discuss your preferences for medical treatment with this person while you are in good health.     Qualities of a surrogate decision maker:  Willing to take on this role and responsibility  Knows what you want for future medical care  Willing to follow your wishes even if they don't agree with them  Able to make difficult medical decisions under stressful circumstances    Advance Directives  These are legal documents you can create that will guide your healthcare team and decision maker(s) when needed. These documents can be stored in the electronic medical record.    Living Will - a legal document to guide your care if you have a terminal condition or a serious illness and are unable to communicate. States vary by statute in document names/types, but most forms may include one or more of the following:        -  Directions regarding life-prolonging treatments        -  Directions regarding artificially provided nutrition/hydration        -  Choosing a healthcare decision maker        -  Direction regarding organ/tissue donation    Durable Power of  for Healthcare - this document names an -in-fact to make medical decisions for you, but it may also allow this person to make personal and financial decisions for you. Please seek the advice of an  if you need this type of document.    **Advance Directives are not required and no one may discriminate against you if you do not sign  one.    Medical Orders  Many states allow specific forms/orders signed by your physician to record your wishes for medical treatment in your current state of health. This form, signed in personal communication with your physician, addresses resuscitation and other medical interventions that you may or may not want.      For more information or to schedule a time with a [facility] Advance Care Planning Facilitator contact: [facility]/ACP or call [phone] and someone will contact you directly.ACP information pamphlet given to the patient.  ACP information provided on the AVS.

## 2024-11-11 PROBLEM — E78.2 HYPERLIPIDEMIA, MIXED: Status: ACTIVE | Noted: 2024-11-11

## 2024-12-30 ENCOUNTER — TELEPHONE (OUTPATIENT)
Dept: OBSTETRICS AND GYNECOLOGY | Facility: CLINIC | Age: 29
End: 2024-12-30
Payer: COMMERCIAL

## 2024-12-30 NOTE — TELEPHONE ENCOUNTER
Caller: Soni Zarate    Relationship: Self    Best call back number:    [phone]    What is the best time to reach you: ASAP    Who are you requesting to speak with (clinical staff, provider,  specific staff member):         What was the call regarding:     LMP 12/3/2024    PT NEEDS NEW OB APPT

## 2025-01-02 ENCOUNTER — TELEPHONE (OUTPATIENT)
Dept: OBSTETRICS AND GYNECOLOGY | Facility: CLINIC | Age: 30
End: 2025-01-02
Payer: COMMERCIAL

## 2025-01-02 ENCOUNTER — LAB (OUTPATIENT)
Dept: OBSTETRICS AND GYNECOLOGY | Facility: CLINIC | Age: 30
End: 2025-01-02
Payer: COMMERCIAL

## 2025-01-02 DIAGNOSIS — Z32.01 POSITIVE PREGNANCY TEST: Primary | ICD-10-CM

## 2025-01-02 NOTE — TELEPHONE ENCOUNTER
Pt called stating she has been having bleeding for past 3 days, pt states it starts as light pink and then gets red to dark brown color. Pt states it has not been enough to fill her panty liner, just there when she wipes. Pt is asking for a call back due to hx of miscarriage.

## 2025-01-02 NOTE — TELEPHONE ENCOUNTER
Called patient according to LMP is 4 weeks and having spotting brown. RH positive from previous pregnancy. Ectopic precautions given. Will come in later today for labs.

## 2025-01-03 ENCOUNTER — TELEPHONE (OUTPATIENT)
Dept: OBSTETRICS AND GYNECOLOGY | Facility: CLINIC | Age: 30
End: 2025-01-03
Payer: COMMERCIAL

## 2025-01-03 LAB — HCG INTACT+B SERPL-ACNC: 111 MIU/ML

## 2025-03-07 DIAGNOSIS — G43.009 MIGRAINE WITHOUT AURA AND WITHOUT STATUS MIGRAINOSUS, NOT INTRACTABLE: ICD-10-CM

## 2025-03-10 RX ORDER — SUMATRIPTAN SUCCINATE 100 MG/1
TABLET ORAL
Qty: 9 TABLET | Refills: 5 | Status: SHIPPED | OUTPATIENT
Start: 2025-03-10

## (undated) DEVICE — GLV SURG BIOGEL LTX PF 6 1/2

## (undated) DEVICE — SUT PLAIN  3/0 CT1 27IN 842H

## (undated) DEVICE — SOL IRR H2O BTL 1000ML STRL

## (undated) DEVICE — COATED VICRYL  (POLYGLACTIN 910) SUTURE, VIOLET BRAIDED, STERILE, SYNTHETIC ABSORBABLE SUTURE: Brand: COATED VICRYL

## (undated) DEVICE — SUT GUT CHRM 1 CTX 36IN 905H

## (undated) DEVICE — TBG PENCL TELESCP MEGADYNE SMOKE EVAC 10FT

## (undated) DEVICE — SUT MONOCRYL SZ 4 0 18IN PS1 Y682H BX/36

## (undated) DEVICE — SOL IRR NACL 0.9PCT BT 1000ML

## (undated) DEVICE — CLTH CLENS READYCLEANSE PERI CARE PK/5

## (undated) DEVICE — TRY SPINE BLCK WHITACRE 25G 3X5IN

## (undated) DEVICE — SUT PDS 1 TP1 48IN Z880G BX/12

## (undated) DEVICE — PK C/SECT 10

## (undated) DEVICE — GLV SURG BIOGEL LTX PF 6

## (undated) DEVICE — PROXIMATE RH ROTATING HEAD SKIN STAPLERS (35 WIDE) CONTAINS 35 STAINLESS STEEL STAPLES: Brand: PROXIMATE

## (undated) DEVICE — SUT GUT CHRM 2/0 CT1 27IN 811H

## (undated) DEVICE — PATIENT RETURN ELECTRODE, SINGLE-USE, CONTACT QUALITY MONITORING, ADULT, WITH 9FT CORD, FOR PATIENTS WEIGING OVER 33LBS. (15KG): Brand: MEGADYNE

## (undated) DEVICE — MAT PREVALON MOBL TRANSFR AIR WO/PAD 39X80IN

## (undated) DEVICE — TRAXI PANNICULUS RETRACTOR WITH RETENTUS TECHNOLOGY (BMI 30-50): Brand: TRAXI® PANNICULUS RETRACTOR

## (undated) DEVICE — 4-PORT MANIFOLD: Brand: NEPTUNE 2

## (undated) DEVICE — SUT VIC 4/0 KS 27IN VCP662H

## (undated) DEVICE — ADHS SKIN PREMIERPRO EXOFIN TOPICAL HI/VISC .5ML

## (undated) DEVICE — EXTRA LARGE, DISPOSABLE C-SECTION RETRACTOR: Brand: ALEXIS ® O C-SECTION PROTECTOR-RETRACTOR

## (undated) DEVICE — SUT VIC 2/0 CT1 27IN J339H BX/36

## (undated) DEVICE — SKIN AFFIX SURG ADHESIVE 72/CS 0.55ML: Brand: MEDLINE

## (undated) DEVICE — TRAP FLD MINIVAC MEGADYNE 100ML

## (undated) DEVICE — APPL CHLORAPREP TINTED 26ML TEAL

## (undated) DEVICE — SUT MNCRYL 1/0 CT1 36IN Y947H BX/36